# Patient Record
Sex: FEMALE | Race: WHITE | Employment: OTHER | URBAN - METROPOLITAN AREA
[De-identification: names, ages, dates, MRNs, and addresses within clinical notes are randomized per-mention and may not be internally consistent; named-entity substitution may affect disease eponyms.]

---

## 2017-01-24 ENCOUNTER — HOSPITAL ENCOUNTER (OUTPATIENT)
Dept: LAB | Age: 57
Discharge: HOME | End: 2017-01-24
Payer: COMMERCIAL

## 2017-01-24 DIAGNOSIS — C92.10 CML (CHRONIC MYELOID LEUKEMIA) (HCC): ICD-10-CM

## 2017-01-24 LAB
ALBUMIN SERPL BCP-MCNC: 3.9 G/DL (ref 3.5–5)
ALBUMIN/GLOB SERPL: 1.3 {RATIO} (ref 1.2–3.5)
ALP SERPL-CCNC: 64 U/L (ref 50–136)
ALT SERPL-CCNC: 36 U/L (ref 12–65)
ANION GAP BLD CALC-SCNC: 5 MMOL/L (ref 7–16)
AST SERPL W P-5'-P-CCNC: 24 U/L (ref 15–37)
BASOPHILS # BLD AUTO: 0.1 K/UL (ref 0–0.2)
BASOPHILS # BLD: 1 % (ref 0–2)
BILIRUB SERPL-MCNC: 0.5 MG/DL (ref 0.2–1.1)
BUN SERPL-MCNC: 8 MG/DL (ref 6–23)
CALCIUM SERPL-MCNC: 8.5 MG/DL (ref 8.3–10.4)
CHLORIDE SERPL-SCNC: 107 MMOL/L (ref 98–107)
CO2 SERPL-SCNC: 29 MMOL/L (ref 23–32)
CREAT SERPL-MCNC: 0.92 MG/DL (ref 0.6–1)
DIFFERENTIAL METHOD BLD: ABNORMAL
EOSINOPHIL # BLD: 0.2 K/UL (ref 0–0.8)
EOSINOPHIL NFR BLD: 5 % (ref 0.5–7.8)
ERYTHROCYTE [DISTWIDTH] IN BLOOD BY AUTOMATED COUNT: 15.5 % (ref 11.9–14.6)
GLOBULIN SER CALC-MCNC: 2.9 G/DL (ref 2.3–3.5)
GLUCOSE SERPL-MCNC: 142 MG/DL (ref 65–100)
HCT VFR BLD AUTO: 33 % (ref 35.8–46.3)
HGB BLD-MCNC: 10.1 G/DL (ref 11.7–15.4)
LYMPHOCYTES # BLD AUTO: 33 % (ref 13–44)
LYMPHOCYTES # BLD: 1.5 K/UL (ref 0.5–4.6)
MCH RBC QN AUTO: 22 PG (ref 26.1–32.9)
MCHC RBC AUTO-ENTMCNC: 30.6 G/DL (ref 31.4–35)
MCV RBC AUTO: 71.7 FL (ref 79.6–97.8)
MONOCYTES # BLD: 0.5 K/UL (ref 0.1–1.3)
MONOCYTES NFR BLD AUTO: 10 % (ref 4–12)
NEUTS SEG # BLD: 2.3 K/UL (ref 1.7–8.2)
NEUTS SEG NFR BLD AUTO: 51 % (ref 43–78)
NRBC # BLD: 0 K/UL (ref 0–0.2)
PLATELET # BLD AUTO: 231 K/UL (ref 150–450)
PMV BLD AUTO: 10.4 FL (ref 10.8–14.1)
POTASSIUM SERPL-SCNC: 3.7 MMOL/L (ref 3.5–5.1)
PROT SERPL-MCNC: 6.8 G/DL (ref 6.3–8.2)
RBC # BLD AUTO: 4.6 M/UL (ref 4.05–5.25)
SODIUM SERPL-SCNC: 141 MMOL/L (ref 136–145)
WBC # BLD AUTO: 4.6 K/UL (ref 4.3–11.1)

## 2017-01-24 PROCEDURE — 80053 COMPREHEN METABOLIC PANEL: CPT | Performed by: INTERNAL MEDICINE

## 2017-01-24 PROCEDURE — 85025 COMPLETE CBC W/AUTO DIFF WBC: CPT | Performed by: INTERNAL MEDICINE

## 2017-01-24 PROCEDURE — 36415 COLL VENOUS BLD VENIPUNCTURE: CPT | Performed by: INTERNAL MEDICINE

## 2017-01-30 LAB
Lab: NORMAL
REFERENCE LAB,REFLB: NORMAL
TEST DESCRIPTION:,ATST: NORMAL

## 2017-01-31 ENCOUNTER — PATIENT OUTREACH (OUTPATIENT)
Dept: CASE MANAGEMENT | Age: 57
End: 2017-01-31

## 2017-01-31 NOTE — ACP (ADVANCE CARE PLANNING)
1/31/17:  Patient here for follow-up with Dr. Salima Ying. Patient tearful and frustrated before the appointment and scored 10 on distress thermometer. Patient given follow-up PHQ forms and total score was 4. Patient admits that she and her  are having problems and she is very frustrated. Patient states that she doesn't want to harm herself or anyone else. She doesn't feel at risk of being harmed. Patient may stay with her mother margareth. Dr. Salima Ying discussed results of BCR-abl and patient very pleased with results. Patient to follow-up with Dr. Salima Ying in 3 months with labs week before.

## 2017-02-21 ENCOUNTER — HOSPITAL ENCOUNTER (OUTPATIENT)
Dept: GENERAL RADIOLOGY | Age: 57
Discharge: HOME | End: 2017-02-21
Payer: COMMERCIAL

## 2017-02-21 DIAGNOSIS — M48.02 CERVICAL SPINAL STENOSIS: ICD-10-CM

## 2017-02-21 DIAGNOSIS — M50.30 DEGENERATION OF CERVICAL INTERVERTEBRAL DISC: ICD-10-CM

## 2017-02-21 PROBLEM — M19.90 ARTHRITIS: Status: ACTIVE | Noted: 2017-02-21

## 2017-02-21 PROBLEM — C80.1 CANCER (HCC): Status: ACTIVE | Noted: 2017-02-21

## 2017-02-21 PROBLEM — E07.9 THYROID DISEASE: Status: ACTIVE | Noted: 2017-02-21

## 2017-02-21 PROBLEM — D64.9 ANEMIA: Status: ACTIVE | Noted: 2017-02-21

## 2017-02-21 PROCEDURE — 72040 X-RAY EXAM NECK SPINE 2-3 VW: CPT

## 2017-02-22 ENCOUNTER — HOSPITAL ENCOUNTER (OUTPATIENT)
Dept: SURGERY | Age: 57
Discharge: HOME | End: 2017-02-22
Payer: COMMERCIAL

## 2017-02-22 VITALS
HEIGHT: 65 IN | SYSTOLIC BLOOD PRESSURE: 117 MMHG | BODY MASS INDEX: 34.82 KG/M2 | TEMPERATURE: 98.4 F | DIASTOLIC BLOOD PRESSURE: 72 MMHG | WEIGHT: 209 LBS | OXYGEN SATURATION: 100 % | RESPIRATION RATE: 16 BRPM | HEART RATE: 98 BPM

## 2017-02-22 LAB
ANION GAP BLD CALC-SCNC: 8 MMOL/L (ref 7–16)
APPEARANCE UR: CLEAR
BACTERIA SPEC CULT: NORMAL
BASOPHILS # BLD AUTO: 0 K/UL (ref 0–0.2)
BASOPHILS # BLD: 0 % (ref 0–2)
BILIRUB UR QL: NEGATIVE
BUN SERPL-MCNC: 10 MG/DL (ref 6–23)
CALCIUM SERPL-MCNC: 8.6 MG/DL (ref 8.3–10.4)
CHLORIDE SERPL-SCNC: 108 MMOL/L (ref 98–107)
CO2 SERPL-SCNC: 30 MMOL/L (ref 21–32)
COLOR UR: YELLOW
CREAT SERPL-MCNC: 0.81 MG/DL (ref 0.6–1)
DIFFERENTIAL METHOD BLD: ABNORMAL
EOSINOPHIL # BLD: 0.2 K/UL (ref 0–0.8)
EOSINOPHIL NFR BLD: 3 % (ref 0.5–7.8)
ERYTHROCYTE [DISTWIDTH] IN BLOOD BY AUTOMATED COUNT: 15.8 % (ref 11.9–14.6)
GLUCOSE SERPL-MCNC: 108 MG/DL (ref 65–100)
GLUCOSE UR STRIP.AUTO-MCNC: NEGATIVE MG/DL
HCT VFR BLD AUTO: 30.8 % (ref 35.8–46.3)
HGB BLD-MCNC: 9.3 G/DL (ref 11.7–15.4)
HGB UR QL STRIP: NEGATIVE
IMM GRANULOCYTES # BLD: 0 K/UL (ref 0–0.5)
IMM GRANULOCYTES NFR BLD AUTO: 0.2 % (ref 0–5)
KETONES UR QL STRIP.AUTO: NEGATIVE MG/DL
LEUKOCYTE ESTERASE UR QL STRIP.AUTO: NEGATIVE
LYMPHOCYTES # BLD AUTO: 28 % (ref 13–44)
LYMPHOCYTES # BLD: 1.7 K/UL (ref 0.5–4.6)
MCH RBC QN AUTO: 21.7 PG (ref 26.1–32.9)
MCHC RBC AUTO-ENTMCNC: 30.2 G/DL (ref 31.4–35)
MCV RBC AUTO: 71.8 FL (ref 79.6–97.8)
MONOCYTES # BLD: 0.6 K/UL (ref 0.1–1.3)
MONOCYTES NFR BLD AUTO: 9 % (ref 4–12)
NEUTS SEG # BLD: 3.6 K/UL (ref 1.7–8.2)
NEUTS SEG NFR BLD AUTO: 60 % (ref 43–78)
NITRITE UR QL STRIP.AUTO: NEGATIVE
PH UR STRIP: 6 [PH] (ref 5–9)
PLATELET # BLD AUTO: 228 K/UL (ref 150–450)
PMV BLD AUTO: 10.4 FL (ref 10.8–14.1)
POTASSIUM SERPL-SCNC: 3.6 MMOL/L (ref 3.5–5.1)
PROT UR STRIP-MCNC: NEGATIVE MG/DL
RBC # BLD AUTO: 4.29 M/UL (ref 4.05–5.25)
SERVICE CMNT-IMP: NORMAL
SODIUM SERPL-SCNC: 146 MMOL/L (ref 136–145)
SP GR UR REFRACTOMETRY: >1.03 (ref 1–1.02)
UROBILINOGEN UR QL STRIP.AUTO: 0.2 EU/DL (ref 0.2–1)
WBC # BLD AUTO: 6.1 K/UL (ref 4.3–11.1)

## 2017-02-22 PROCEDURE — 81003 URINALYSIS AUTO W/O SCOPE: CPT | Performed by: NEUROLOGICAL SURGERY

## 2017-02-22 PROCEDURE — 80048 BASIC METABOLIC PNL TOTAL CA: CPT | Performed by: NEUROLOGICAL SURGERY

## 2017-02-22 PROCEDURE — 85025 COMPLETE CBC W/AUTO DIFF WBC: CPT | Performed by: NEUROLOGICAL SURGERY

## 2017-02-22 PROCEDURE — 87641 MR-STAPH DNA AMP PROBE: CPT | Performed by: NEUROLOGICAL SURGERY

## 2017-02-22 RX ORDER — AMOXICILLIN 500 MG/1
500 CAPSULE ORAL ONCE
COMMUNITY
End: 2017-05-23

## 2017-02-22 RX ORDER — ESTRADIOL 0.1 MG/G
2 CREAM VAGINAL
COMMUNITY
End: 2018-05-08 | Stop reason: SDUPTHER

## 2017-02-22 RX ORDER — SIMETHICONE 80 MG
80 TABLET,CHEWABLE ORAL
COMMUNITY
End: 2017-07-25

## 2017-02-22 RX ORDER — BACLOFEN 10 MG/1
10 TABLET ORAL 3 TIMES DAILY
COMMUNITY
End: 2018-07-27

## 2017-02-22 NOTE — PERIOP NOTES
Patient verified name, , and surgery as listed in [facility]. TYPE  CASE:lll  Labs per surgeon: cbc, bmp and urinalysis and MRSA  Labs per anesthesia protocol: T&S on arrival  EKG  :  ekg from  being faxed from Dr Megan Mullen, patient denies any hx of chest pain, SOB with stairs or CAD  MRSA/MSSA:awaiting results  Pt instructed that they will be notified of positive results and will use mupirocin ointment as directed. Patient provided with handouts including guide to surgery , transfusions, pain management and hand hygiene for the family and community. Pt verbalizes understanding of all pre-op instructions . Instructed that family must be present in building at all times. Hibiclens and instructions given per hospital policy. Instructed patient to continue  previous medications as prescribed prior to surgery and  to take the following medications the day of surgery according to anesthesia guidelines : baclofen if needed, levothyroxine, and tramadol if needed     Continue all previous medications unless otherwise directed. Original medication prescription bottles were visualized during patient appointment. Instructed patient to hold  the following medications prior to surgery: excedrin andvitamins      Patient verbalized understanding of all instructions and provided all medical/health information to the best of their ability. Road to Recovery Spine surgery patient guide given. Instructed on incentive spirometry with return demonstration. Long handled prehab sponge given with instructions for use. Patient viewed spine prehab video.

## 2017-03-04 ENCOUNTER — ANESTHESIA EVENT (OUTPATIENT)
Dept: SURGERY | Age: 57
End: 2017-03-04
Payer: COMMERCIAL

## 2017-03-04 RX ORDER — SODIUM CHLORIDE 0.9 % (FLUSH) 0.9 %
5-10 SYRINGE (ML) INJECTION EVERY 8 HOURS
Status: CANCELLED | OUTPATIENT
Start: 2017-03-05

## 2017-03-04 RX ORDER — SODIUM CHLORIDE 0.9 % (FLUSH) 0.9 %
5-10 SYRINGE (ML) INJECTION AS NEEDED
Status: CANCELLED | OUTPATIENT
Start: 2017-03-04

## 2017-03-05 NOTE — ANESTHESIA PREPROCEDURE EVALUATION
Anesthetic History               Review of Systems / Medical History  Patient summary reviewed, nursing notes reviewed and pertinent labs reviewed    Pulmonary                   Neuro/Psych         Headaches (Migraines)     Cardiovascular                  Exercise tolerance: >4 METS     GI/Hepatic/Renal           PUD (Remote hx)     Endo/Other      Hypothyroidism: well controlled  Obesity and arthritis     Other Findings   Comments: CML -- in remission    Chronic anemia secondary to beta thalassemia         Physical Exam    Airway  Mallampati: II  TM Distance: 4 - 6 cm  Neck ROM: decreased range of motion   Mouth opening: Normal     Cardiovascular  Regular rate and rhythm,  S1 and S2 normal,  no murmur, click, rub, or gallop             Dental         Pulmonary  Breath sounds clear to auscultation               Abdominal  GI exam deferred       Other Findings            Anesthetic Plan    ASA: 3  Anesthesia type: general            Anesthetic plan and risks discussed with: Patient

## 2017-03-06 ENCOUNTER — APPOINTMENT (OUTPATIENT)
Dept: GENERAL RADIOLOGY | Age: 57
End: 2017-03-06
Attending: NEUROLOGICAL SURGERY
Payer: COMMERCIAL

## 2017-03-06 ENCOUNTER — HOSPITAL ENCOUNTER (OUTPATIENT)
Age: 57
Setting detail: OUTPATIENT SURGERY
Discharge: HOME | End: 2017-03-06
Attending: NEUROLOGICAL SURGERY | Admitting: NEUROLOGICAL SURGERY
Payer: COMMERCIAL

## 2017-03-06 ENCOUNTER — ANESTHESIA (OUTPATIENT)
Dept: SURGERY | Age: 57
End: 2017-03-06
Payer: COMMERCIAL

## 2017-03-06 VITALS
SYSTOLIC BLOOD PRESSURE: 131 MMHG | HEIGHT: 65 IN | DIASTOLIC BLOOD PRESSURE: 74 MMHG | OXYGEN SATURATION: 96 % | HEART RATE: 67 BPM | BODY MASS INDEX: 34.82 KG/M2 | WEIGHT: 209 LBS | RESPIRATION RATE: 18 BRPM | TEMPERATURE: 98.5 F

## 2017-03-06 LAB
ABO + RH BLD: NORMAL
BLOOD GROUP ANTIBODIES SERPL: NORMAL
SPECIMEN EXP DATE BLD: NORMAL

## 2017-03-06 PROCEDURE — 74011250636 HC RX REV CODE- 250/636: Performed by: ANESTHESIOLOGY

## 2017-03-06 PROCEDURE — 77030026404 HC PLT SPN ANCHR ROIC ZIMM -H: Performed by: NEUROLOGICAL SURGERY

## 2017-03-06 PROCEDURE — 77030018390 HC SPNG HEMSTAT2 J&J -B: Performed by: NEUROLOGICAL SURGERY

## 2017-03-06 PROCEDURE — 76210000021 HC REC RM PH II 0.5 TO 1 HR: Performed by: NEUROLOGICAL SURGERY

## 2017-03-06 PROCEDURE — 74011000250 HC RX REV CODE- 250: Performed by: ANESTHESIOLOGY

## 2017-03-06 PROCEDURE — 77030003029 HC SUT VCRL J&J -B: Performed by: NEUROLOGICAL SURGERY

## 2017-03-06 PROCEDURE — 76060000034 HC ANESTHESIA 1.5 TO 2 HR: Performed by: NEUROLOGICAL SURGERY

## 2017-03-06 PROCEDURE — 74011000250 HC RX REV CODE- 250: Performed by: NEUROLOGICAL SURGERY

## 2017-03-06 PROCEDURE — 77030011640 HC PAD GRND REM COVD -A: Performed by: NEUROLOGICAL SURGERY

## 2017-03-06 PROCEDURE — 77030014650 HC SEAL MTRX FLOSEL BAXT -C: Performed by: NEUROLOGICAL SURGERY

## 2017-03-06 PROCEDURE — 77030019908 HC STETH ESOPH SIMS -A: Performed by: ANESTHESIOLOGY

## 2017-03-06 PROCEDURE — 77030004391 HC BUR FLUT MEDT -C: Performed by: NEUROLOGICAL SURGERY

## 2017-03-06 PROCEDURE — 74011250636 HC RX REV CODE- 250/636

## 2017-03-06 PROCEDURE — 77030032490 HC SLV COMPR SCD KNE COVD -B: Performed by: NEUROLOGICAL SURGERY

## 2017-03-06 PROCEDURE — 77030020782 HC GWN BAIR PAWS FLX 3M -B: Performed by: ANESTHESIOLOGY

## 2017-03-06 PROCEDURE — 86901 BLOOD TYPING SEROLOGIC RH(D): CPT | Performed by: NEUROLOGICAL SURGERY

## 2017-03-06 PROCEDURE — 77030016570 HC BLNKT BAIR HGGR 3M -B: Performed by: ANESTHESIOLOGY

## 2017-03-06 PROCEDURE — 77030010507 HC ADH SKN DERMBND J&J -B: Performed by: NEUROLOGICAL SURGERY

## 2017-03-06 PROCEDURE — 74011250636 HC RX REV CODE- 250/636: Performed by: NEUROLOGICAL SURGERY

## 2017-03-06 PROCEDURE — 76210000006 HC OR PH I REC 0.5 TO 1 HR: Performed by: NEUROLOGICAL SURGERY

## 2017-03-06 PROCEDURE — 77030008703 HC TU ET UNCUF COVD -A: Performed by: ANESTHESIOLOGY

## 2017-03-06 PROCEDURE — 77030030163 HC BN WAX J&J -A: Performed by: NEUROLOGICAL SURGERY

## 2017-03-06 PROCEDURE — 74011250637 HC RX REV CODE- 250/637: Performed by: ANESTHESIOLOGY

## 2017-03-06 PROCEDURE — 77030018836 HC SOL IRR NACL ICUM -A: Performed by: NEUROLOGICAL SURGERY

## 2017-03-06 PROCEDURE — 77030034475 HC MISC IMPL SPN: Performed by: NEUROLOGICAL SURGERY

## 2017-03-06 PROCEDURE — 88304 TISSUE EXAM BY PATHOLOGIST: CPT | Performed by: NEUROLOGICAL SURGERY

## 2017-03-06 PROCEDURE — 72040 X-RAY EXAM NECK SPINE 2-3 VW: CPT

## 2017-03-06 PROCEDURE — C1713 ANCHOR/SCREW BN/BN,TIS/BN: HCPCS | Performed by: NEUROLOGICAL SURGERY

## 2017-03-06 PROCEDURE — 74011000250 HC RX REV CODE- 250

## 2017-03-06 PROCEDURE — 76010000162 HC OR TIME 1.5 TO 2 HR INTENSV-TIER 1: Performed by: NEUROLOGICAL SURGERY

## 2017-03-06 PROCEDURE — 77030003666 HC NDL SPINAL BD -A: Performed by: NEUROLOGICAL SURGERY

## 2017-03-06 PROCEDURE — 77030011267 HC ELECTRD BLD COVD -A: Performed by: NEUROLOGICAL SURGERY

## 2017-03-06 PROCEDURE — 77030008477 HC STYL SATN SLP COVD -A: Performed by: ANESTHESIOLOGY

## 2017-03-06 PROCEDURE — 77030026405 HC CGE ANT CERV ROIC ZIMM -H: Performed by: NEUROLOGICAL SURGERY

## 2017-03-06 DEVICE — ROI-C ANCHORING PLATE
Type: IMPLANTABLE DEVICE | Site: SPINE CERVICAL | Status: FUNCTIONAL
Brand: ROI-C

## 2017-03-06 DEVICE — ROI-C LORDOTIC IMPLANT H7MM 12 X 14MM
Type: IMPLANTABLE DEVICE | Site: SPINE CERVICAL | Status: FUNCTIONAL
Brand: ROI-C

## 2017-03-06 RX ORDER — OXYCODONE AND ACETAMINOPHEN 7.5; 325 MG/1; MG/1
1 TABLET ORAL
Qty: 30 TAB | Refills: 0 | Status: SHIPPED | OUTPATIENT
Start: 2017-03-06 | End: 2017-05-23

## 2017-03-06 RX ORDER — LIDOCAINE HYDROCHLORIDE AND EPINEPHRINE 10; 10 MG/ML; UG/ML
INJECTION, SOLUTION INFILTRATION; PERINEURAL AS NEEDED
Status: DISCONTINUED | OUTPATIENT
Start: 2017-03-06 | End: 2017-03-06 | Stop reason: HOSPADM

## 2017-03-06 RX ORDER — OXYCODONE HYDROCHLORIDE 5 MG/1
10 TABLET ORAL
Status: DISCONTINUED | OUTPATIENT
Start: 2017-03-06 | End: 2017-03-06 | Stop reason: HOSPADM

## 2017-03-06 RX ORDER — PROPOFOL 10 MG/ML
INJECTION, EMULSION INTRAVENOUS AS NEEDED
Status: DISCONTINUED | OUTPATIENT
Start: 2017-03-06 | End: 2017-03-06 | Stop reason: HOSPADM

## 2017-03-06 RX ORDER — NEOSTIGMINE METHYLSULFATE 1 MG/ML
INJECTION INTRAVENOUS AS NEEDED
Status: DISCONTINUED | OUTPATIENT
Start: 2017-03-06 | End: 2017-03-06 | Stop reason: HOSPADM

## 2017-03-06 RX ORDER — NALOXONE HYDROCHLORIDE 0.4 MG/ML
0.1 INJECTION, SOLUTION INTRAMUSCULAR; INTRAVENOUS; SUBCUTANEOUS AS NEEDED
Status: DISCONTINUED | OUTPATIENT
Start: 2017-03-06 | End: 2017-03-06 | Stop reason: HOSPADM

## 2017-03-06 RX ORDER — CEFAZOLIN SODIUM IN 0.9 % NACL 2 G/50 ML
2 INTRAVENOUS SOLUTION, PIGGYBACK (ML) INTRAVENOUS ONCE
Status: DISCONTINUED | OUTPATIENT
Start: 2017-03-06 | End: 2017-03-06 | Stop reason: HOSPADM

## 2017-03-06 RX ORDER — SODIUM CHLORIDE, SODIUM LACTATE, POTASSIUM CHLORIDE, CALCIUM CHLORIDE 600; 310; 30; 20 MG/100ML; MG/100ML; MG/100ML; MG/100ML
125 INJECTION, SOLUTION INTRAVENOUS CONTINUOUS
Status: DISCONTINUED | OUTPATIENT
Start: 2017-03-06 | End: 2017-03-06 | Stop reason: HOSPADM

## 2017-03-06 RX ORDER — LIDOCAINE HYDROCHLORIDE 20 MG/ML
INJECTION, SOLUTION EPIDURAL; INFILTRATION; INTRACAUDAL; PERINEURAL AS NEEDED
Status: DISCONTINUED | OUTPATIENT
Start: 2017-03-06 | End: 2017-03-06 | Stop reason: HOSPADM

## 2017-03-06 RX ORDER — GLYCOPYRROLATE 0.2 MG/ML
INJECTION INTRAMUSCULAR; INTRAVENOUS AS NEEDED
Status: DISCONTINUED | OUTPATIENT
Start: 2017-03-06 | End: 2017-03-06 | Stop reason: HOSPADM

## 2017-03-06 RX ORDER — FENTANYL CITRATE 50 UG/ML
INJECTION, SOLUTION INTRAMUSCULAR; INTRAVENOUS AS NEEDED
Status: DISCONTINUED | OUTPATIENT
Start: 2017-03-06 | End: 2017-03-06 | Stop reason: HOSPADM

## 2017-03-06 RX ORDER — ACETAMINOPHEN 500 MG
1000 TABLET ORAL ONCE
Status: DISCONTINUED | OUTPATIENT
Start: 2017-03-06 | End: 2017-03-06 | Stop reason: HOSPADM

## 2017-03-06 RX ORDER — FAMOTIDINE 10 MG/ML
20 INJECTION INTRAVENOUS ONCE
Status: COMPLETED | OUTPATIENT
Start: 2017-03-06 | End: 2017-03-06

## 2017-03-06 RX ORDER — MIDAZOLAM HYDROCHLORIDE 1 MG/ML
2 INJECTION, SOLUTION INTRAMUSCULAR; INTRAVENOUS
Status: DISCONTINUED | OUTPATIENT
Start: 2017-03-06 | End: 2017-03-06 | Stop reason: HOSPADM

## 2017-03-06 RX ORDER — SODIUM CHLORIDE 0.9 % (FLUSH) 0.9 %
5-10 SYRINGE (ML) INJECTION AS NEEDED
Status: DISCONTINUED | OUTPATIENT
Start: 2017-03-06 | End: 2017-03-06 | Stop reason: HOSPADM

## 2017-03-06 RX ORDER — LIDOCAINE HYDROCHLORIDE 10 MG/ML
0.1 INJECTION INFILTRATION; PERINEURAL AS NEEDED
Status: DISCONTINUED | OUTPATIENT
Start: 2017-03-06 | End: 2017-03-06 | Stop reason: HOSPADM

## 2017-03-06 RX ORDER — HYDROMORPHONE HYDROCHLORIDE 2 MG/ML
0.5 INJECTION, SOLUTION INTRAMUSCULAR; INTRAVENOUS; SUBCUTANEOUS
Status: DISCONTINUED | OUTPATIENT
Start: 2017-03-06 | End: 2017-03-06 | Stop reason: HOSPADM

## 2017-03-06 RX ORDER — ROCURONIUM BROMIDE 10 MG/ML
INJECTION, SOLUTION INTRAVENOUS AS NEEDED
Status: DISCONTINUED | OUTPATIENT
Start: 2017-03-06 | End: 2017-03-06 | Stop reason: HOSPADM

## 2017-03-06 RX ORDER — DEXAMETHASONE SODIUM PHOSPHATE 100 MG/10ML
10 INJECTION INTRAMUSCULAR; INTRAVENOUS ONCE
Status: COMPLETED | OUTPATIENT
Start: 2017-03-06 | End: 2017-03-06

## 2017-03-06 RX ORDER — ONDANSETRON 2 MG/ML
INJECTION INTRAMUSCULAR; INTRAVENOUS AS NEEDED
Status: DISCONTINUED | OUTPATIENT
Start: 2017-03-06 | End: 2017-03-06 | Stop reason: HOSPADM

## 2017-03-06 RX ORDER — BACITRACIN 50000 [IU]/1
INJECTION, POWDER, FOR SOLUTION INTRAMUSCULAR AS NEEDED
Status: DISCONTINUED | OUTPATIENT
Start: 2017-03-06 | End: 2017-03-06 | Stop reason: HOSPADM

## 2017-03-06 RX ORDER — KETOROLAC TROMETHAMINE 30 MG/ML
30 INJECTION, SOLUTION INTRAMUSCULAR; INTRAVENOUS AS NEEDED
Status: DISCONTINUED | OUTPATIENT
Start: 2017-03-06 | End: 2017-03-06 | Stop reason: HOSPADM

## 2017-03-06 RX ADMIN — ONDANSETRON 4 MG: 2 INJECTION INTRAMUSCULAR; INTRAVENOUS at 08:07

## 2017-03-06 RX ADMIN — SODIUM CHLORIDE, SODIUM LACTATE, POTASSIUM CHLORIDE, AND CALCIUM CHLORIDE 125 ML/HR: 600; 310; 30; 20 INJECTION, SOLUTION INTRAVENOUS at 06:54

## 2017-03-06 RX ADMIN — ROCURONIUM BROMIDE 5 MG: 10 INJECTION, SOLUTION INTRAVENOUS at 08:39

## 2017-03-06 RX ADMIN — DEXAMETHASONE SODIUM PHOSPHATE 10 MG: 10 INJECTION INTRAMUSCULAR; INTRAVENOUS at 06:54

## 2017-03-06 RX ADMIN — SODIUM CHLORIDE, SODIUM LACTATE, POTASSIUM CHLORIDE, AND CALCIUM CHLORIDE: 600; 310; 30; 20 INJECTION, SOLUTION INTRAVENOUS at 08:32

## 2017-03-06 RX ADMIN — MIDAZOLAM HYDROCHLORIDE 2 MG: 1 INJECTION, SOLUTION INTRAMUSCULAR; INTRAVENOUS at 06:56

## 2017-03-06 RX ADMIN — NEOSTIGMINE METHYLSULFATE 4 MG: 1 INJECTION INTRAVENOUS at 08:48

## 2017-03-06 RX ADMIN — ROCURONIUM BROMIDE 40 MG: 10 INJECTION, SOLUTION INTRAVENOUS at 07:37

## 2017-03-06 RX ADMIN — OXYCODONE HYDROCHLORIDE 10 MG: 5 TABLET ORAL at 10:20

## 2017-03-06 RX ADMIN — LIDOCAINE HYDROCHLORIDE 100 MG: 20 INJECTION, SOLUTION EPIDURAL; INFILTRATION; INTRACAUDAL; PERINEURAL at 07:37

## 2017-03-06 RX ADMIN — HYDROMORPHONE HYDROCHLORIDE 0.5 MG: 2 INJECTION, SOLUTION INTRAMUSCULAR; INTRAVENOUS; SUBCUTANEOUS at 09:20

## 2017-03-06 RX ADMIN — GLYCOPYRROLATE 0.6 MG: 0.2 INJECTION INTRAMUSCULAR; INTRAVENOUS at 08:48

## 2017-03-06 RX ADMIN — LIDOCAINE HYDROCHLORIDE 0.1 ML: 10 INJECTION, SOLUTION INFILTRATION; PERINEURAL at 06:53

## 2017-03-06 RX ADMIN — FENTANYL CITRATE 50 MCG: 50 INJECTION, SOLUTION INTRAMUSCULAR; INTRAVENOUS at 08:03

## 2017-03-06 RX ADMIN — HYDROMORPHONE HYDROCHLORIDE 0.5 MG: 2 INJECTION, SOLUTION INTRAMUSCULAR; INTRAVENOUS; SUBCUTANEOUS at 09:25

## 2017-03-06 RX ADMIN — FAMOTIDINE 20 MG: 10 INJECTION, SOLUTION INTRAVENOUS at 06:54

## 2017-03-06 RX ADMIN — FENTANYL CITRATE 100 MCG: 50 INJECTION, SOLUTION INTRAMUSCULAR; INTRAVENOUS at 07:37

## 2017-03-06 RX ADMIN — FENTANYL CITRATE 50 MCG: 50 INJECTION, SOLUTION INTRAMUSCULAR; INTRAVENOUS at 08:00

## 2017-03-06 RX ADMIN — CEFAZOLIN 2 G: 1 INJECTION, POWDER, FOR SOLUTION INTRAMUSCULAR; INTRAVENOUS; PARENTERAL at 07:40

## 2017-03-06 RX ADMIN — PROPOFOL 170 MG: 10 INJECTION, EMULSION INTRAVENOUS at 07:37

## 2017-03-06 NOTE — DISCHARGE INSTRUCTIONS
[facility]  [address], [address] [facility], [address] [facility]  [phone]    Postoperative Home Instructions    - SHOWERING: You may shower the first day you are home with the dressing on. Do not soak in a tub for at least three weeks. Use only soap and water on the incision and pat it dry. Do not scrub the incision.    - WOUND CARE: A small to moderate amount of reddish drainage on the dressing is normal the first 1-2 days after surgery. If your dressing becomes soaked with drainage, let your doctor know. [facility] HANDS BEFORE AND AFTER INCISION CARE.    - SIGNS OF INFECTION: Please notify your physician for any of the following: a temperature greater than 100.5, extreme tenderness at the wound, excessive redness and/or swelling, or large amounts of drainage from the wound. If you think you have a wound infection, call your physician's office immediately.    - SWALLOWING: Don't be alarmed if it seems there is a lump in your throat for several days after surgery- this is normal. Eat only soft foods and drink plenty of fluids until your throat feels better. If you begin having trouble swallowing, call the office immediately. - DRIVING: You may not begin driving until after your visit to the physician's office for a wound and suture check. This is normally 7-10 days after you come home from the hospital.    - MEDICATIONS: You may not take anti-inflammatory medications. These include over-the-counter ibuprofen products such as Motrin, Advil, Aleve and other related medications or prescription medications such as Ultram, Naproxen, Indocin, or Celebrex and others. These medications slow down the bone healing. You may take Tylenol. The pain medicine prescribed may be taken as needed. You should continue taking a stool softener twice a day, drink plenty of water and eat high fiber foods to avoid constipation. (This is a common problem patients experience while taking pain medicine).     - DEEP BREATHING EXERCISES: Continue to use your incentive spirometer for deep breathing exercises. - SMOKING: Smoking will interfere with your healing. If you smoke, you may end up having another surgery or more problems.    - ACTIVITY: Avoid reaching overhead, particularly to lift things, until you have been told that your fusion has healed. Do not lift objects heavier than a coffee cup or a newspaper. You shouldn't lift anything heavier than 2-5 pounds. When lifting, you should bend with your knees and keep your back straight. Sleeping may be difficult and sometimes requires you to change positions frequently; try to sleep with your head elevated 15-30 degrees. You may turn your head  Gradually from side to side, but avoid placing your chin to your chest or flexing your head backwards. You may remove your MARIKA hose when consistently walking. You may do steps and inclines in moderation.    - SEXUAL RELATIONS: You may resume sexual relations 2 weeks after your surgery. - WALKING PROGRAM: After your sutures are removed or dissolved, it is very important that you begin a progressive walking program. Walking strengthens the spinal muscles and will help protect your disc and vertebrae. You will need to increase your walking program up to two miles per day over the next four weeks. You should begin slowly with 1/8 to 1/4 of a mile and add to this each day. Please be very consistent with your walking program, as this is a vital part of your recovery.    - SYMPTOMS AFTER SURGERY: Don't be alarmed if you still have some of the same symptoms you had prior to surgery. The nerves often require time to heal after the pressure has been relieved. You may also experience pain between your shoulder blades which is common after this surgery. The level of pain you experience should improve as your body heals. - FOLLOW-UP: An appointment will be made for you to follow-up with your surgeon or the office nurse.  Please bring any X-rays of your neck to the appointmet. Please call your physician's office if you have any other questions or problems. Listen to your body, it will tell you if you are overdoing it. Use common sense and take care of yourself! Please call our office if you have any other questions or problems. Listen to your body; it will tell you if you are overdoing it. Use common sense and take care of yourself! After general anesthesia or intravenous sedation, for 24 hours or while taking prescription Narcotics:  · Limit your activities  · Do not drive and operate hazardous machinery  · Do not make important personal or business decisions  · Do  not drink alcoholic beverages  · If you have not urinated within 8 hours after discharge, please contact your surgeon on call. *  Please give a list of your current medications to your Primary Care Provider. *  Please update this list whenever your medications are discontinued, doses are      changed, or new medications (including over-the-counter products) are added. *  Please carry medication information at all times in case of emergency situations. These are general instructions for a healthy lifestyle:  No smoking/ No tobacco products/ Avoid exposure to second hand smoke  Surgeon General's Warning:  Quitting smoking now greatly reduces serious risk to your health. Obesity, smoking, and sedentary lifestyle greatly increases your risk for illness  A healthy diet, regular physical exercise & weight monitoring are important for maintaining a healthy lifestyle    You may be retaining fluid if you have a history of heart failure or if you experience any of the following symptoms:  Weight gain of 3 pounds or more overnight or 5 pounds in a week, increased swelling in our hands or feet or shortness of breath while lying flat in bed. Please call your doctor as soon as you notice any of these symptoms; do not wait until your next office visit.     Recognize signs and symptoms of STROKE:    F-face looks uneven    A-arms unable to move or move unevenly    S-speech slurred or non-existent    T-time-call 911 as soon as signs and symptoms begin-DO NOT go       Back to bed or wait to see if you get better-TIME IS BRAIN.

## 2017-03-06 NOTE — BRIEF OP NOTE
BRIEF OPERATIVE NOTE    Date of Procedure: 3/6/2017   Preoperative Diagnosis: Degenerative cervical disc [M50.30]  Cervical stenosis of spine [M48.02]  Postoperative Diagnosis: Degenerative cervical disc [M50.30]  Cervical stenosis of spine [M48.02]    Procedure(s):  ACDF C 4-5 PLATE REMOVAL C5 6  Surgeon(s) and Role:     * Erendira Herzog MD - Primary            Surgical Staff:  Circ-1: Mateusz Samuels RN  Radiology Technician: Margie Fox RT, R, CT  Scrub Tech-1: Rodman Goodell  Scrub Private/Assistant: Rsoendo Jeffries  Event Time In   Incision Start 7079   Incision Close 9385     Anesthesia: General   Estimated Blood Loss: minimal  Specimens:   ID Type Source Tests Collected by Time Destination   1 : disc material C4-5 Preservative Disc Material  Erendira Herzog MD 3/6/2017 0166 Pathology      Findings: stenosis  Complications: none  Implants:   Implant Name Type Inv.  Item Serial No.  Lot No. LRB No. Used Action   osteoamp   DUG--259 [facility]  N/A 1 Implanted   CAGE CERV LORDTC [id number] -- ADRIANO-C - [id number]  CAGE CERV LORDTC [id number] -- ADRIANO-C  [employer] [id number] N/A 1 Implanted   PLATE [facility] STD --  - [id number]   PLATE [facility] STD --    [employer] [id number] N/A 1 Implanted

## 2017-03-06 NOTE — ANESTHESIA POSTPROCEDURE EVALUATION
Post-Anesthesia Evaluation and Assessment    Patient: Scharlene Shone MRN: [id number]  SSN: [id number]    YOB: 1960  Age: 55 y.o. Sex: female       Cardiovascular Function/Vital Signs  Visit Vitals    /74    Pulse 67    Temp 36.9 °C (98.5 °F)    Resp 18    Ht 5' 5\" (1.651 m)    Wt 94.8 kg (209 lb)    SpO2 96%    BMI 34.78 kg/m2       Patient is status post general anesthesia for Procedure(s):  ACDF C 4-5 PLATE REMOVAL C5 6. Nausea/Vomiting: None    Postoperative hydration reviewed and adequate. Pain:  Pain Scale 1: Numeric (0 - 10) (03/06/17 0955)  Pain Intensity 1: 2 (03/06/17 0955)   Managed    Neurological Status:   Neuro (WDL): Within Defined Limits (03/06/17 0902)  Neuro  LUE Motor Response: Purposeful (03/06/17 0902)  LLE Motor Response: Purposeful (03/06/17 0902)  RUE Motor Response: Purposeful (03/06/17 0902)  RLE Motor Response: Purposeful (03/06/17 0902)   At baseline    Mental Status and Level of Consciousness: Arousable    Pulmonary Status:   O2 Device: Room air (03/06/17 0955)   Adequate oxygenation and airway patent    Complications related to anesthesia: None    Post-anesthesia assessment completed.  No concerns    Signed By: Oksana Solis MD     March 6, 2017

## 2017-03-06 NOTE — ANESTHESIA POSTPROCEDURE EVALUATION
Post-Anesthesia Evaluation and Assessment    Patient: Eveline Jimenez MRN: [id number]  SSN: [id number]    YOB: 1960  Age: 55 y.o. Sex: female       Cardiovascular Function/Vital Signs  Visit Vitals    /74    Pulse 67    Temp 36.9 °C (98.5 °F)    Resp 18    Ht 5' 5\" (1.651 m)    Wt 94.8 kg (209 lb)    SpO2 96%    BMI 34.78 kg/m2       Patient is status post general anesthesia for Procedure(s):  ACDF C 4-5 PLATE REMOVAL C5 6. Nausea/Vomiting: None    Postoperative hydration reviewed and adequate. Pain:  Pain Scale 1: Numeric (0 - 10) (03/06/17 0955)  Pain Intensity 1: 2 (03/06/17 0955)   Managed    Neurological Status:   Neuro (WDL): Within Defined Limits (03/06/17 0902)  Neuro  LUE Motor Response: Purposeful (03/06/17 0902)  LLE Motor Response: Purposeful (03/06/17 0902)  RUE Motor Response: Purposeful (03/06/17 0902)  RLE Motor Response: Purposeful (03/06/17 0902)   At baseline    Mental Status and Level of Consciousness: Arousable    Pulmonary Status:   O2 Device: Room air (03/06/17 0955)   Adequate oxygenation and airway patent    Complications related to anesthesia: None    Post-anesthesia assessment completed.  No concerns    Signed By: Kang Selby MD     March 6, 2017

## 2017-03-06 NOTE — OP NOTES
[address] [facility]   OPERATIVE REPORT       Name:  Cate Roque   MR#:  [id number]   :  1960   Account #:  [de-identified]   Date of Adm:  2017       DATE OF SURGERY: 2013. PREPROCEDURE DIAGNOSIS: Cervical stenosis with cord compression   and radiculopathy. POSTPROCEDURE DIAGNOSIS: Cervical stenosis with cord compression   and radiculopathy. NAME OF PROCEDURE:   1. Anterior cervical spine total diskectomy with decompression   of spinal cord and nerve roots, C4-5.   2. Anterior cervical spine interbody fusion with LDR ADRIANO-C cage,   OsteoAMP, and bone marrow aspirate, C4-5.   3. Anterior cervical spine instrumentation with LDR plating, C4-  5.   4. Anterior plate removal, O1-6.   5. Bone marrow aspiration, C4 vertebra. 6. Continuous intraoperative fluoroscopy. SURGEON: Alec Vargas. Hannah Garcia MD.    ANESTHESIA: General endotracheal.    ESTIMATED BLOOD LOSS: Minimal.    PREPARATION: ChloraPrep. COMPLICATIONS: None. SPECIMENS REMOVED: DISC    HISTORY OF PRESENT ILLNESS: A 55-year-old lady status post   cervical fusion at C5-6 13 years ago with good results. She   developed left upper extremity pain and weakness refractory to   conservative measures. MRI scanning and x-rays confirmed a   stable fusion at C5-6 with stenosis and disk protrusion at C4-5   centrally and to the left. Cord compression was noted. The top   half of the plate was encroaching the disk space and, therefore,   in order to access the disk space, the plate would have to be   removed. OPERATIVE NOTE: The patient was brought to the operating room,   was carefully placed under general endotracheal anesthesia   without complications, placed supine with a roll under the   shoulder blades, the head gently extended on a Bermudez pillow,   and the right side of the neck carefully prepped in the usual   sterile fashion.  The previous incision was outlined, infiltrated   with 1% Xylocaine with epinephrine, incised with a 15 blade, and   carried sharply through the platysma. Using rostral to caudal   subplatysmal dissection, a plane was created between the carotid   artery laterally and trachea and esophagus medially. Handheld   retractors were placed and the old tissue in the longus colli   muscles were stripped bilaterally. Lateral C-arm fluoroscopy   confirmed. An 18-gauge spinal needle inserted into the disk   space. Scar tissue was dissected off of the plate at Q3-5. The   locking mechanism was disengaged and using a square screwdriver,   4 screws were removed without any complications. The screw holes   were packed with Surgicel for hemostasis. The plate was then   easily removed with a small hemostat. Bony fusion was noted. At   this point, the C4 vertebra was entered with this, a [place]   distracting pin awl, and using a 3 mL syringe and 14 gauge   Angiocath, approximately 3 mL of bone marrow were aspirated   and were mixed with OsteoAMP on the back table. The hole was   plugged with Nu-Knit Surgicel and bone wax. The [employer] Bipin AMA   drill was used. The end-plates were drilled down to the   posterior longitudinal ligament which was opened with a 1 mm   [place] rongeur and the dura was widely decompressed   bilaterally. A ball tip probe was passed along the nerve roots   without further compression. The left side was tighter than the   right. A #7 LDR lordotic trial was tightly placed in the disk   space with good contact and distraction noted by fluoroscopy. A   #7 LDR ADRIANO-C cage was packed with OsteoAMP and bone marrow   aspirate was countersunk to a nice, tight fit using traction, a   bone impactor, and a mallet. The LDR plates were engaged first   inferiorly and then superiorly with 1 and 2-mm taps and a   mallet. The wound was irrigated with thrombin and during this   time, AP and lateral x-ray were obtained which confirmed good   position of the graft and hardware. Additional thrombin was   placed for 2 minutes. No further bleeding was noted. FloSeal was   placed. The wound was dry and it was closed. The platysma was   closed tightly with interrupted 3-0 Vicryl and subcutaneous   tissues closed with interrupted 3-0 Vicryl. Skin was closed with   Dermabond. The patient tolerated the procedure well, was   awakened, extubated, and taken to PACU in stable condition. There were no obvious complications. DISCHARGE INSTRUCTIONS: Diet regularly. Activity up as   tolerated. No heavy lifting, bending, or automobile driving. Showers are permissible, but no baths. The patient will contact   the physician if she develops any fever, drainage, swelling,   cellulitis, or neurological change. Return visit in 10 to 14   days for wound check. DISCHARGE MEDICATIONS: Include admission medicines plus Percocet   7.5/325 every 8 hours p.r.n. DISCHARGE CONDITION: Satisfactory.         Merlinda Marlin, MD [facility] / José Green   D:  03/06/2017   08:58   T:  03/06/2017   14:25   Job #:  [id number]

## 2017-03-06 NOTE — IP AVS SNAPSHOT
[address] [id number] 
[phone] Patient: Tai Caba MRN: [id number] NER:5/50/1667 You are allergic to the following Allergen Reactions Latex, Natural Rubber Shortness of Breath Latex Other (comments) Respiratory issues Hydrocodone-Acetaminophen Nausea and Vomiting Name brand lortab ok; allergic to generic only Meperidine Nausea and Vomiting Other Medication Rash Pt is allergic to iv contrast, bananas and kiwi fruit Recent Documentation Height Weight BMI OB Status Smoking Status 1.651 m 94.8 kg 34.78 kg/m2 Hysterectomy Never Smoker Emergency Contacts Name Discharge Info Relation Home Work Mobile Nicholas Lyn DISCHARGE CAREGIVER [3] Spouse [3] [phone] About your hospitalization You were admitted on:  March 6, 2017 [facility] on:  March 6, 2017 Unit phone number:  [phone] Why you were hospitalized Your primary diagnosis was:  Not on File Providers Seen During Your Hospitalizations Provider Role Specialty Primary office phone Angelika Tomas MD Attending Provider Neurosurgery [phone] Your Primary Care Physician (PCP) Primary Care Physician Office Phone Office Fax Robert Shipman [phone] Follow-up Information Follow up With Details Comments Contact Info Angelika Tomas MD Go on 3/20/2017 Follow up at 9:00 am [address] [facility] [address] 
[phone] Current Discharge Medication List  
  
START taking these medications Dose & Instructions Dispensing Information Comments Morning Noon Evening Bedtime  
 oxyCODONE-acetaminophen 7.5-325 mg per tablet Commonly known as:  PERCOCET 7.5 Your next dose is: Today, Tomorrow Other:  _________ Dose:  1 Tab Take 1 Tab by mouth every eight (8) hours as needed for Pain. Max Daily Amount: 3 Tabs. Quantity:  30 Tab Refills:  0 CONTINUE these medications which have CHANGED Dose & Instructions Dispensing Information Comments Morning Noon Evening Bedtime  
 furosemide 20 mg tablet Commonly known as:  LASIX What changed:  additional instructions Your next dose is: Today, Tomorrow Other:  _________ Dose:  20 mg Take 1 Tab by mouth daily as needed. Quantity:  30 Tab Refills:  0 CONTINUE these medications which have NOT CHANGED Dose & Instructions Dispensing Information Comments Morning Noon Evening Bedtime  
 amoxicillin 500 mg capsule Commonly known as:  AMOXIL Your next dose is: Today, Tomorrow Other:  _________ Dose:  500 mg Take 500 mg by mouth once. Takes 4 tabs one hour to dental appt Refills:  0  
     
   
   
   
  
 aspirin-acetaminophen-caffeine 250-250-65 mg per tablet Commonly known as:  EXCEDRIN ES Your next dose is: Today, Tomorrow Other:  _________ Dose:  1 Tab Take 1 Tab by mouth every six (6) hours as needed for Headache. Holding for surgery Refills:  0  
     
   
   
   
  
 baclofen 10 mg tablet Commonly known as:  LIORESAL Your next dose is: Today, Tomorrow Other:  _________ Dose:  10 mg Take 10 mg by mouth three (3) times daily. Indications: MUSCLE SPASTICITY OF SPINAL ORIGIN Refills:  0  
     
   
   
   
  
 clobetasol 0.05 % topical cream  
Commonly known as:  Nilda Mangle Your next dose is: Today, Tomorrow Other:  _________ Apply  to affected area as needed.   
 Refills:  0  
     
   
   
   
  
 clotrimazole 1 % topical cream  
Commonly known as:  Chele Dutta  
   
 Your next dose is: Today, Tomorrow Other:  _________ Apply  to affected area two (2) times daily as needed. Refills:  0 ESTRACE 0.01 % (0.1 mg/gram) vaginal cream  
Generic drug:  estradiol Your next dose is: Today, Tomorrow Other:  _________ Dose:  2 g Insert 2 g into vagina BID Mon Wed & Fri. Refills:  0  
     
   
   
   
  
 GAS-X 80 mg chewable tablet Generic drug:  simethicone Your next dose is: Today, Tomorrow Other:  _________ Dose:  80 mg Take 80 mg by mouth every six (6) hours as needed for Flatulence. Refills:  0  
     
   
   
   
  
 imatinib 400 mg tablet Commonly known as:  GLEEVEC Your next dose is: Today, Tomorrow Other:  _________ TAKE 1 TABLET (400MG) BY MOUTH ONCE A DAY WITH FOOD AND WATER. MAY CAUSE NAUSEA, VOMITING, MUSCLE PAIN AND EDEMA (SWELLING). AVOID GRAPEFRUI Quantity:  30 Tab Refills:  5  
     
   
   
   
  
 levothyroxine 137 mcg tablet Commonly known as:  SYNTHROID Your next dose is: Today, Tomorrow Other:  _________ Dose:  137 mcg Take 137 mcg by mouth Daily (before breakfast). Quantity:  90 Tab Refills:  2  
     
   
   
   
  
 traMADol 50 mg tablet Commonly known as:  ULTRAM  
   
Your next dose is: Today, Tomorrow Other:  _________ Dose:  50 mg Take 1 Tab by mouth every six (6) hours as needed for Pain. Max Daily Amount: 200 mg. Quantity:  30 Tab Refills:  1 TYLENOL PM PO Your next dose is: Today, Tomorrow Other:  _________ Take  by mouth. Refills:  0  
     
   
   
   
  
 valACYclovir 500 mg tablet Commonly known as:  VALTREX Your next dose is: Today, Tomorrow Other:  _________ Dose:  1000 mg Take 1,000 mg by mouth as needed. Refills:  0 Where to Get Your Medications Information on where to get these meds will be given to you by the nurse or doctor. ! Ask your nurse or doctor about these medications  
  oxyCODONE-acetaminophen 7.5-325 mg per tablet Discharge Instructions [facility] 
[address] [facility], [address] 
[phone] Postoperative Home Instructions 
 
- SHOWERING: You may shower the first day you are home with the dressing on. Do not soak in a tub for at least three weeks. Use only soap and water on the incision and pat it dry. Do not scrub the incision. 
 
- WOUND CARE: A small to moderate amount of reddish drainage on the dressing is normal the first 1-2 days after surgery. If your dressing becomes soaked with drainage, let your doctor know. [facility] HANDS BEFORE AND AFTER INCISION CARE. 
 
- SIGNS OF INFECTION: Please notify your physician for any of the following: a temperature greater than 100.5, extreme tenderness at the wound, excessive redness and/or swelling, or large amounts of drainage from the wound. If you think you have a wound infection, call your physician's office immediately. 
 
- SWALLOWING: Don't be alarmed if it seems there is a lump in your throat for several days after surgery- this is normal. Eat only soft foods and drink plenty of fluids until your throat feels better. If you begin having trouble swallowing, call the office immediately. - DRIVING: You may not begin driving until after your visit to the physician's office for a wound and suture check. This is normally 7-10 days after you come home from the hospital. 
 
- MEDICATIONS: You may not take anti-inflammatory medications. These include over-the-counter ibuprofen products such as Motrin, Advil, Aleve and other related medications or prescription medications such as Ultram, Naproxen, Indocin, or Celebrex and others.  These medications slow down the bone healing. You may take Tylenol. The pain medicine prescribed may be taken as needed. You should continue taking a stool softener twice a day, drink plenty of water and eat high fiber foods to avoid constipation. (This is a common problem patients experience while taking pain medicine). - DEEP BREATHING EXERCISES: Continue to use your incentive spirometer for deep breathing exercises. - SMOKING: Smoking will interfere with your healing. If you smoke, you may end up having another surgery or more problems. 
 
- ACTIVITY: Avoid reaching overhead, particularly to lift things, until you have been told that your fusion has healed. Do not lift objects heavier than a coffee cup or a newspaper. You shouldn't lift anything heavier than 2-5 pounds. When lifting, you should bend with your knees and keep your back straight. Sleeping may be difficult and sometimes requires you to change positions frequently; try to sleep with your head elevated 15-30 degrees. You may turn your head  Gradually from side to side, but avoid placing your chin to your chest or flexing your head backwards. You may remove your MARIKA hose when consistently walking. You may do steps and inclines in moderation. 
 
- SEXUAL RELATIONS: You may resume sexual relations 2 weeks after your surgery. - WALKING PROGRAM: After your sutures are removed or dissolved, it is very important that you begin a progressive walking program. Walking strengthens the spinal muscles and will help protect your disc and vertebrae. You will need to increase your walking program up to two miles per day over the next four weeks. You should begin slowly with 1/8 to 1/4 of a mile and add to this each day. Please be very consistent with your walking program, as this is a vital part of your recovery. 
 
- SYMPTOMS AFTER SURGERY: Don't be alarmed if you still have some of the same symptoms you had prior to surgery.  The nerves often require time to heal after the pressure has been relieved. You may also experience pain between your shoulder blades which is common after this surgery. The level of pain you experience should improve as your body heals. - FOLLOW-UP: An appointment will be made for you to follow-up with your surgeon or the office nurse. Please bring any X-rays of your neck to the appointmet. Please call your physician's office if you have any other questions or problems. Listen to your body, it will tell you if you are overdoing it. Use common sense and take care of yourself! Please call our office if you have any other questions or problems. Listen to your body; it will tell you if you are overdoing it. Use common sense and take care of yourself! After general anesthesia or intravenous sedation, for 24 hours or while taking prescription Narcotics: · Limit your activities · Do not drive and operate hazardous machinery · Do not make important personal or business decisions · Do  not drink alcoholic beverages · If you have not urinated within 8 hours after discharge, please contact your surgeon on call. *  Please give a list of your current medications to your Primary Care Provider. *  Please update this list whenever your medications are discontinued, doses are 
    changed, or new medications (including over-the-counter products) are added. *  Please carry medication information at all times in case of emergency situations. These are general instructions for a healthy lifestyle: No smoking/ No tobacco products/ Avoid exposure to second hand smoke Surgeon General's Warning:  Quitting smoking now greatly reduces serious risk to your health. Obesity, smoking, and sedentary lifestyle greatly increases your risk for illness A healthy diet, regular physical exercise & weight monitoring are important for maintaining a healthy lifestyle You may be retaining fluid if you have a history of heart failure or if you experience any of the following symptoms:  Weight gain of 3 pounds or more overnight or 5 pounds in a week, increased swelling in our hands or feet or shortness of breath while lying flat in bed. Please call your doctor as soon as you notice any of these symptoms; do not wait until your next office visit. Recognize signs and symptoms of STROKE: 
 
F-face looks uneven A-arms unable to move or move unevenly S-speech slurred or non-existent T-time-call 911 as soon as signs and symptoms begin-DO NOT go Back to bed or wait to see if you get better-TIME IS BRAIN. Discharge Orders None Introducing [facility]! Dear Gilberto Fleming: Thank you for requesting a Guguchu account. Our records indicate that you already have an active Guguchu account. You can access your account anytime at [email or web address] Did you know that you can access your hospital and ER discharge instructions at any time in Guguchu? You can also review all of your test results from your hospital stay or ER visit. Additional Information If you have questions, please visit the Frequently Asked Questions section of the Guguchu website at [email or web address] Remember, Guguchu is NOT to be used for urgent needs. For medical emergencies, dial 911. Now available from your iPhone and Android! General Information Please provide this summary of care documentation to your next provider. Patient Signature:  ____________________________________________________________ Date:  ____________________________________________________________  
  
[facility] Provider Signature:  ____________________________________________________________ Date:  ____________________________________________________________

## 2017-03-08 NOTE — OP NOTES
[address]   OPERATIVE REPORT       Name:  Jonelle Bobby   MR#:  [id number]   :  1960   Account #:  [de-identified]   Date of Adm:  2017       DATE OF SURGERY: 2017    PREPROCEDURE DIAGNOSIS: Cervical stenosis with cord compression   and radiculopathy. POSTPROCEDURE DIAGNOSIS: Cervical stenosis with cord compression   and radiculopathy. NAME OF PROCEDURE:   1. Anterior cervical spine total diskectomy with decompression   of spinal cord and nerve roots, C4-5.   2. Anterior cervical spine interbody fusion with LDR ADRIANO-C cage,   OsteoAMP, and bone marrow aspirate, C4-5.   3. Anterior cervical spine instrumentation with LDR plating, C4-  5.   4. Anterior plate removal, T2-0.   5. Bone marrow aspiration, C4 vertebra. 6. Continuous intraoperative fluoroscopy. SURGEON: Jeannine Marie. Joseph Fontanez MD.    ANESTHESIA: General endotracheal.    ESTIMATED BLOOD LOSS: Minimal.    PREPARATION: ChloraPrep. COMPLICATIONS: None. SPECIMENS REMOVED: C4-5  DISC    HISTORY OF PRESENT ILLNESS: A 31-year-old lady status post   cervical fusion at C5-6 13 years ago with good results. She   developed left upper extremity pain and weakness refractory to   conservative measures. MRI scanning and x-rays confirmed a   stable fusion at C5-6 with stenosis and disk protrusion at C4-5   centrally and to the left. Cord compression was noted. The top   half of the plate was encroaching the disk space and, therefore,   in order to access the disk space, the plate would have to be   removed. OPERATIVE NOTE: The patient was brought to the operating room,   was carefully placed under general endotracheal anesthesia   without complications, placed supine with a roll under the   shoulder blades, the head gently extended on a Berumdez pillow,   and the right side of the neck carefully prepped in the usual   sterile fashion.  The previous incision was outlined, infiltrated   with 1% Xylocaine with epinephrine, incised with a 15 blade, and   carried sharply through the platysma. Using rostral to caudal   subplatysmal dissection, a plane was created between the carotid   artery laterally and trachea and esophagus medially. Handheld   retractors were placed and the old tissue in the longus colli   muscles were stripped bilaterally. Lateral C-arm fluoroscopy   confirmed. An 18-gauge spinal needle inserted into the disk   space. Scar tissue was dissected off of the plate at X3-2. The   locking mechanism was disengaged and using a square screwdriver,   4 screws were removed without any complications. The screw holes   were packed with Surgicel for hemostasis. The plate was then   easily removed with a small hemostat. Bony fusion was noted. At   this point, the C4 vertebra was entered with this, a Derby   distracting pin awl, and using a 3 mL syringe and 14 gauge   Angiocath, approximately 3 mL of bone marrow were aspirated   and were mixed with OsteoAMP on the back table. The hole was   plugged with Nu-Knit Surgicel and bone wax. The Midas Bipin AMA   drill was used. The end-plates were drilled down to the   posterior longitudinal ligament which was opened with a 1 mm   Kerrison rongeur and the dura was widely decompressed   bilaterally. A ball tip probe was passed along the nerve roots   without further compression. The left side was tighter than the   right. A #7 LDR lordotic trial was tightly placed in the disk   space with good contact and distraction noted by fluoroscopy. A   #7 LDR ADRIANO-C cage was packed with OsteoAMP and bone marrow   aspirate was countersunk to a nice, tight fit using traction, a   bone impactor, and a mallet. The LDR plates were engaged first   inferiorly and then superiorly with 1 and 2-mm taps and a   mallet. The wound was irrigated with thrombin and during this   time, AP and lateral x-ray were obtained which confirmed good   position of the graft and hardware.  Additional thrombin was   placed for 2 minutes. No further bleeding was noted. FloSeal was   placed. The wound was dry and it was closed. The platysma was   closed tightly with interrupted 3-0 Vicryl and subcutaneous   tissues closed with interrupted 3-0 Vicryl. Skin was closed with   Dermabond. The patient tolerated the procedure well, was   awakened, extubated, and taken to PACU in stable condition. There were no obvious complications. DISCHARGE INSTRUCTIONS: Diet regularly. Activity up as   tolerated. No heavy lifting, bending, or automobile driving. Showers are permissible, but no baths. The patient will contact   the physician if she develops any fever, drainage, swelling,   cellulitis, or neurological change. Return visit in 10 to 14   days for wound check. DISCHARGE MEDICATIONS: Include admission medicines plus Percocet   7.5/325 every 8 hours p.r.n. DISCHARGE CONDITION: Satisfactory.         MD Lillian Ellis / Boris Cotton   D:  03/06/2017   08:58   T:  03/06/2017   14:25   Job #:  [id number]

## 2017-04-18 ENCOUNTER — HOSPITAL ENCOUNTER (OUTPATIENT)
Dept: GENERAL RADIOLOGY | Age: 57
Discharge: HOME | End: 2017-04-18
Payer: COMMERCIAL

## 2017-04-18 DIAGNOSIS — M48.02 CERVICAL STENOSIS OF SPINE: ICD-10-CM

## 2017-04-18 PROCEDURE — 72040 X-RAY EXAM NECK SPINE 2-3 VW: CPT

## 2017-04-25 ENCOUNTER — HOSPITAL ENCOUNTER (OUTPATIENT)
Dept: LAB | Age: 57
Discharge: HOME | End: 2017-04-25
Payer: COMMERCIAL

## 2017-04-25 DIAGNOSIS — C92.10 CML (CHRONIC MYELOID LEUKEMIA) (HCC): ICD-10-CM

## 2017-04-25 LAB
ALBUMIN SERPL BCP-MCNC: 3.9 G/DL (ref 3.5–5)
ALBUMIN/GLOB SERPL: 1.3 {RATIO} (ref 1.2–3.5)
ALP SERPL-CCNC: 75 U/L (ref 50–136)
ALT SERPL-CCNC: 26 U/L (ref 12–65)
ANION GAP BLD CALC-SCNC: 7 MMOL/L (ref 7–16)
AST SERPL W P-5'-P-CCNC: 18 U/L (ref 15–37)
BASOPHILS # BLD AUTO: 0.1 K/UL (ref 0–0.2)
BASOPHILS # BLD: 1 % (ref 0–2)
BILIRUB SERPL-MCNC: 0.6 MG/DL (ref 0.2–1.1)
BUN SERPL-MCNC: 11 MG/DL (ref 6–23)
CALCIUM SERPL-MCNC: 8.6 MG/DL (ref 8.3–10.4)
CHLORIDE SERPL-SCNC: 107 MMOL/L (ref 98–107)
CO2 SERPL-SCNC: 29 MMOL/L (ref 21–32)
CREAT SERPL-MCNC: 0.77 MG/DL (ref 0.6–1)
DIFFERENTIAL METHOD BLD: ABNORMAL
EOSINOPHIL # BLD: 0.3 K/UL (ref 0–0.8)
EOSINOPHIL NFR BLD: 5 % (ref 0.5–7.8)
ERYTHROCYTE [DISTWIDTH] IN BLOOD BY AUTOMATED COUNT: 14.7 % (ref 11.9–14.6)
GLOBULIN SER CALC-MCNC: 3.1 G/DL (ref 2.3–3.5)
GLUCOSE SERPL-MCNC: 108 MG/DL (ref 65–100)
HCT VFR BLD AUTO: 32.5 % (ref 35.8–46.3)
HGB BLD-MCNC: 10 G/DL (ref 11.7–15.4)
LYMPHOCYTES # BLD AUTO: 27 % (ref 13–44)
LYMPHOCYTES # BLD: 1.6 K/UL (ref 0.5–4.6)
MAGNESIUM SERPL-MCNC: 2.2 MG/DL (ref 1.8–2.4)
MCH RBC QN AUTO: 22.3 PG (ref 26.1–32.9)
MCHC RBC AUTO-ENTMCNC: 30.8 G/DL (ref 31.4–35)
MCV RBC AUTO: 72.4 FL (ref 79.6–97.8)
MONOCYTES # BLD: 0.5 K/UL (ref 0.1–1.3)
MONOCYTES NFR BLD AUTO: 8 % (ref 4–12)
NEUTS SEG # BLD: 3.4 K/UL (ref 1.7–8.2)
NEUTS SEG NFR BLD AUTO: 59 % (ref 43–78)
NRBC # BLD: 0 K/UL (ref 0–0.2)
PLATELET # BLD AUTO: 255 K/UL (ref 150–450)
PMV BLD AUTO: 10 FL (ref 10.8–14.1)
POTASSIUM SERPL-SCNC: 4.3 MMOL/L (ref 3.5–5.1)
PROT SERPL-MCNC: 7 G/DL (ref 6.3–8.2)
RBC # BLD AUTO: 4.49 M/UL (ref 4.05–5.25)
SODIUM SERPL-SCNC: 143 MMOL/L (ref 136–145)
WBC # BLD AUTO: 5.8 K/UL (ref 4.3–11.1)

## 2017-04-25 PROCEDURE — 36415 COLL VENOUS BLD VENIPUNCTURE: CPT | Performed by: INTERNAL MEDICINE

## 2017-04-25 PROCEDURE — 80053 COMPREHEN METABOLIC PANEL: CPT | Performed by: INTERNAL MEDICINE

## 2017-04-25 PROCEDURE — 83735 ASSAY OF MAGNESIUM: CPT | Performed by: INTERNAL MEDICINE

## 2017-04-25 PROCEDURE — 85025 COMPLETE CBC W/AUTO DIFF WBC: CPT | Performed by: INTERNAL MEDICINE

## 2017-04-28 LAB
Lab: NORMAL
REFERENCE LAB,REFLB: NORMAL
TEST DESCRIPTION:,ATST: NORMAL

## 2017-05-24 ENCOUNTER — PATIENT OUTREACH (OUTPATIENT)
Dept: CASE MANAGEMENT | Age: 57
End: 2017-05-24

## 2017-05-24 NOTE — PROGRESS NOTES
Pt seen and labs (drawn in April) reviewed by Dr. Maris Stout. Dr Maris Stout discussed with patient that she is in a complete molecular response. Pt reports feeling well and no side effects from [address]. Will continue current dose and return in 3 months. Pt aware of future appts.

## 2017-07-18 ENCOUNTER — HOSPITAL ENCOUNTER (OUTPATIENT)
Dept: GENERAL RADIOLOGY | Age: 57
Discharge: HOME | End: 2017-07-18
Payer: COMMERCIAL

## 2017-07-18 DIAGNOSIS — M48.02 CERVICAL STENOSIS OF SPINE: ICD-10-CM

## 2017-07-18 PROBLEM — M50.30 DDD (DEGENERATIVE DISC DISEASE), CERVICAL: Status: ACTIVE | Noted: 2017-07-18

## 2017-07-18 PROBLEM — M54.2 NECK PAIN: Status: ACTIVE | Noted: 2017-07-18

## 2017-07-18 PROCEDURE — 72040 X-RAY EXAM NECK SPINE 2-3 VW: CPT

## 2017-07-25 PROBLEM — R73.03 PREDIABETES: Status: ACTIVE | Noted: 2017-07-25

## 2017-07-26 PROBLEM — E78.5 HYPERLIPIDEMIA: Status: ACTIVE | Noted: 2017-07-26

## 2017-07-30 ENCOUNTER — APPOINTMENT (OUTPATIENT)
Dept: GENERAL RADIOLOGY | Age: 57
End: 2017-07-30
Attending: EMERGENCY MEDICINE
Payer: COMMERCIAL

## 2017-07-30 ENCOUNTER — HOSPITAL ENCOUNTER (EMERGENCY)
Age: 57
Discharge: HOME | End: 2017-07-30
Attending: EMERGENCY MEDICINE
Payer: COMMERCIAL

## 2017-07-30 VITALS
RESPIRATION RATE: 16 BRPM | TEMPERATURE: 98.6 F | BODY MASS INDEX: 34.49 KG/M2 | DIASTOLIC BLOOD PRESSURE: 76 MMHG | OXYGEN SATURATION: 97 % | HEIGHT: 65 IN | SYSTOLIC BLOOD PRESSURE: 138 MMHG | HEART RATE: 84 BPM | WEIGHT: 207 LBS

## 2017-07-30 DIAGNOSIS — S62.101A WRIST FRACTURE, RIGHT, CLOSED, INITIAL ENCOUNTER: Primary | ICD-10-CM

## 2017-07-30 PROCEDURE — 75810000053 HC SPLINT APPLICATION: Performed by: EMERGENCY MEDICINE

## 2017-07-30 PROCEDURE — 74011250637 HC RX REV CODE- 250/637: Performed by: EMERGENCY MEDICINE

## 2017-07-30 PROCEDURE — 74011250636 HC RX REV CODE- 250/636: Performed by: EMERGENCY MEDICINE

## 2017-07-30 PROCEDURE — 99283 EMERGENCY DEPT VISIT LOW MDM: CPT | Performed by: EMERGENCY MEDICINE

## 2017-07-30 PROCEDURE — 73110 X-RAY EXAM OF WRIST: CPT

## 2017-07-30 PROCEDURE — 96372 THER/PROPH/DIAG INJ SC/IM: CPT | Performed by: EMERGENCY MEDICINE

## 2017-07-30 RX ORDER — ONDANSETRON 4 MG/1
4 TABLET, ORALLY DISINTEGRATING ORAL
Status: COMPLETED | OUTPATIENT
Start: 2017-07-30 | End: 2017-07-30

## 2017-07-30 RX ORDER — HYDROMORPHONE HYDROCHLORIDE 1 MG/ML
1 INJECTION, SOLUTION INTRAMUSCULAR; INTRAVENOUS; SUBCUTANEOUS
Status: COMPLETED | OUTPATIENT
Start: 2017-07-30 | End: 2017-07-30

## 2017-07-30 RX ORDER — TRAMADOL HYDROCHLORIDE 50 MG/1
50 TABLET ORAL
Qty: 20 TAB | Refills: 0 | Status: SHIPPED | OUTPATIENT
Start: 2017-07-30 | End: 2017-09-01 | Stop reason: SDUPTHER

## 2017-07-30 RX ADMIN — ONDANSETRON 4 MG: 4 TABLET, ORALLY DISINTEGRATING ORAL at 17:55

## 2017-07-30 RX ADMIN — HYDROMORPHONE HYDROCHLORIDE 1 MG: 1 INJECTION, SOLUTION INTRAMUSCULAR; INTRAVENOUS; SUBCUTANEOUS at 17:55

## 2017-07-30 NOTE — DISCHARGE INSTRUCTIONS
Broken Wrist: Care Instructions  Your Care Instructions    Your wrist can break, or fracture, during sports, a fall, or other accidents. The break may happen when your wrist is hit or is used to protect you in a fall. Fractures can range from a small, hairline crack, to a bone or bones broken into two or more pieces. Your treatment depends on how bad the break is. Your doctor may have put your wrist in a cast or splint. This will help keep your wrist stable until your follow-up appointment. It may take weeks or months for your wrist to heal. You can help it heal with care at home. You heal best when you take good care of yourself. Eat a variety of healthy foods, and don't smoke. Follow-up care is a key part of your treatment and safety. Be sure to make and go to all appointments, and call your doctor if you are having problems. It's also a good idea to know your test results and keep a list of the medicines you take. How can you care for yourself at home? · Put ice or a cold pack on your wrist for 10 to 20 minutes at a time. Try to do this every 1 to 2 hours for the next 3 days (when you are awake). Put a thin cloth between the ice and your cast or splint. Keep your cast or splint dry. · Follow the splint or cast care instructions your doctor gives you. If you have a splint, do not take it off unless your doctor tells you to. Be careful not to put the splint on too tight. · Be safe with medicines. Take pain medicines exactly as directed. ¨ If the doctor gave you a prescription medicine for pain, take it as prescribed. ¨ If you are not taking a prescription pain medicine, ask your doctor if you can take an over-the-counter medicine. · Prop up your wrist on pillows when you sit or lie down in the first few days after the injury. Keep your wrist higher than the level of your heart. This will help reduce swelling.   · Move your fingers often to reduce swelling and stiffness, but do not use that hand to grab or carry anything. · Follow instructions for exercises to keep your arm strong. When should you call for help? Call your doctor now or seek immediate medical care if:  · You have increased or severe pain. · Your cast or splint feels too tight. · You cannot move your fingers. · You have tingling, weakness, or numbness in your hand and fingers. · Your hand and fingers are cool or pale or change color. · You have a lot of swelling near your cast or splint. · The skin under your cast or splint is burning or stinging. Watch closely for changes in your health, and be sure to contact your doctor if:  · You do not get better as expected. Where can you learn more? Go to http://gibson-tory.info/. Enter [id number] in the search box to learn more about \"Broken Wrist: Care Instructions. \"  Current as of: March 21, 2017  Content Version: 11.3  © 3365-6937 [employer] Care instructions adapted under license by [employer] (which disclaims liability or warranty for this information). If you have questions about a medical condition or this instruction, always ask your healthcare professional. [address] any warranty or liability for your use of this information.

## 2017-07-30 NOTE — ED PROVIDER NOTES
HPI Comments: Patient tripped and fell and heart her right wrist.  She did not hit her head and had no loss of consciousness. She has swelling of the right wrist and the ice as helps. Patient is a 65 y.o. female presenting with wrist pain and fall. The history is provided by the patient. Wrist Pain    This is a new problem. The current episode started 1 to 2 hours ago. The problem occurs constantly. The pain is present in the right wrist. The quality of the pain is described as pounding and aching. The pain is moderate. Associated symptoms include limited range of motion, stiffness and neck pain. Pertinent negatives include no numbness, no itching and no back pain. The symptoms are aggravated by palpation and activity. She has tried nothing for the symptoms. There has been a history of trauma. Fall   Pertinent negatives include no numbness.         Past Medical History:   Diagnosis Date    Anemia 2/21/2017    Arthritis     Beta thalassemia (HCC)     Beta+ thalassemia (HCC)     Chronic pain     neck left arm and shoulder    CML (chronic myeloid leukemia) ([facility] [address] 3/8/2016    patient states she is stable at this time    DDD (degenerative disc disease), lumbar     herniated disc L2-3, L5-S1 (leaking)-treatment w/epidural injection    Diverticulosis of colon (without mention of hemorrhage) 2015    Hypothyroid     levothyroxine for hypo    Insomnia     Internal hemorrhoids without mention of complication 3488    Migraine headache     PUD (peptic ulcer disease) 2002ish    GI bleed s/p use of celebrex; no other issues    Rectocele 2015    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    Spinal stenosis, cervical region        Past Surgical History:   Procedure Laterality Date    HX APPENDECTOMY  2007    HX CERVICAL FUSION  2004    C4-5,C5-6    HX COLONOSCOPY  last 1/19/15    Sunitha--no polyps--7-10 year recall    HX CYSTOCELE REPAIR  1991    HX HYSTERECTOMY  1991    with cystocele and rectocele    HX KNEE REPLACEMENT Right 2011    HX RECTOCELE REPAIR  1991    HX TUBAL LIGATION  1986    HX WISDOM TEETH EXTRACTION  1979    [place] ANESTH,SURGERY OF SHOULDER Left 1993         Family History:   Problem Relation Age of Onset    Hypertension Mother     Thyroid Disease Mother     Arthritis-osteo Mother     Heart Disease Father     Hypertension Father     Diabetes Father     Stroke Father     Dementia Father     Parkinson's Disease Father     Breast Cancer Maternal Aunt        Social History     Social History    Marital status:      Spouse name: N/A    Number of children: N/A    Years of education: N/A     Occupational History    Not on file. Social History Main Topics    Smoking status: Never Smoker    Smokeless tobacco: Never Used    Alcohol use No    Drug use: No    Sexual activity: Yes     Birth control/ protection: Surgical     Other Topics Concern    Not on file     Social History Narrative         ALLERGIES: Latex, natural rubber; Latex; Hydrocodone-acetaminophen; Meperidine; and Other medication    Review of Systems   Musculoskeletal: Positive for neck pain and stiffness. Negative for back pain. Skin: Negative for itching, rash and wound. Neurological: Negative for numbness. Vitals:    07/30/17 1519   BP: 146/79   Pulse: 94   Resp: 16   Temp: 98.8 °F (37.1 °C)   SpO2: 95%   Weight: 93.9 kg (207 lb)   Height: 5' 5\" (1.651 m)            Physical Exam   Constitutional: She is oriented to person, place, and time. She appears well-developed and well-nourished. No distress. HENT:   Head: Normocephalic and atraumatic. Musculoskeletal: She exhibits edema and tenderness. She exhibits no deformity. Able move all of her fingers has normal cap refill and normal distal radial pulse; ecchymosis and swelling of the distal radius ulna   Neurological: She is alert and oriented to person, place, and time. No cranial nerve deficit. Skin: Skin is warm and dry.  No rash noted. She is not diaphoretic. No erythema. No pallor. Nursing note and vitals reviewed. MDM  Number of Diagnoses or Management Options  Wrist fracture, right, closed, initial encounter:   Diagnosis management comments: multiple distal radius/ulna fractures. Placed in a sugar tong splint and given a shot of Dilaudid and Zofran and has OxyContin at home and requested only a prescription of tramadol which she typically takes for pain.   Follow-up with orthopedist discussed with Dr. Pato Duvall        Amount and/or Complexity of Data Reviewed  Tests in the radiology section of CPT®: ordered and reviewed    Risk of Complications, Morbidity, and/or Mortality  Presenting problems: moderate  Diagnostic procedures: moderate  Management options: moderate    Patient Progress  Patient progress: improved    ED Course       Procedures

## 2017-07-31 ENCOUNTER — PATIENT OUTREACH (OUTPATIENT)
Dept: OTHER | Age: 57
End: 2017-07-31

## 2017-07-31 NOTE — PROGRESS NOTES
Transition Of Care Note    Patient discharged from [facility] ED visit for right wrist fracture after a fall. Medical History:     Past Medical History:   Diagnosis Date    Anemia 2017    Arthritis     Beta thalassemia (HCC)     Beta+ thalassemia (HCC)     Chronic pain     neck left arm and shoulder    CML (chronic myeloid leukemia) ([facility] [address] 3/8/2016    patient states she is stable at this time    DDD (degenerative disc disease), lumbar     herniated disc L2-3, L5-S1 (leaking)-treatment w/epidural injection    Diverticulosis of colon (without mention of hemorrhage)     Hypothyroid     levothyroxine for hypo    Insomnia     Internal hemorrhoids without mention of complication     Migraine headache     PUD (peptic ulcer disease) 2002ish    GI bleed s/p use of celebrex; no other issues    Rectocele     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 Verified  and zip code with patient as identifiers. Provided introduction to self, and explanation of the Nurse Care Manager role. Spoke with patient who is staying with her son at his apartment in [address] [place] [address]. I added his address in . She is in significant pain 8/10. I inquired about ice, but she states the cold is unable to penetrate the cast.  She is keeping the right wrist elevated. She is taking her Percocet one pill every 8 hrs that she has left over from her spinal procedure a few months ago. I asked if she was taking any anti-inflammatory meds, but she was told not to take the Tramadol with the Percocet per the ED nurse on DC. The percocet helps her pain until 6hrs out from taking the medication, the pain then significantly returns without relief. She states the Ortho practice is unable to see her until Thursday but she will run out of Percocet before that time.      She requested and I offered to place a call to her PCP office to either call her at her son's apartment on her cell or call me to explain what is happening with the patient currently and her need for probable additional pain medication. Also to notify PCP office of ER visit and fall yesterday. She also stated he could  a prescription,  but they are in [place], [place], a long way from a [facility]. I placed a call to her PCP and left her a message to call either myself or the patient back and explained the above details. The office stated the medical assistant would return the call. Medication:   Performed medication reconciliation with patient, and patient verbalizes understanding of administration of home medications. There were no barriers to obtaining medications identified at this time. Support system:  patient    Discharge Instructions :  Reviewed discharge instructions with patient. Patient verbalizes understanding of discharge instructions and follow-up care. Red Flags:  · You have increased or severe pain. · Your cast or splint feels too tight. · You cannot move your fingers. · You have tingling, weakness, or numbness in your hand and fingers. · Your hand and fingers are cool or pale or change color. · You have a lot of swelling near your cast or splint. · The skin under your cast or splint is burning or stinging. Advance Care Planning:   Patient was offered the opportunity to discuss advance care planning:  no     Does patient have an Advance Directive:  no   If no, did you provide information on Caring Connections?  no     PCP/Specialist follow up: Patient scheduled to follow up with [address] [place] [address] on Thursday this week. Reviewed red flags with patient, and patient verbalizes understanding. Patient given an opportunity to ask questions. No other clinical/social/functional needs noted. The patient agrees to contact the PCP office for questions related to their healthcare.   The patient expressed thanks, offered no additional questions and ended the call.

## 2017-08-21 ENCOUNTER — PATIENT OUTREACH (OUTPATIENT)
Dept: INTERNAL MEDICINE CLINIC | Age: 57
End: 2017-08-21

## 2017-08-22 ENCOUNTER — HOSPITAL ENCOUNTER (OUTPATIENT)
Dept: LAB | Age: 57
Discharge: HOME | End: 2017-08-22
Payer: COMMERCIAL

## 2017-08-22 DIAGNOSIS — C92.10 CML (CHRONIC MYELOID LEUKEMIA) (HCC): ICD-10-CM

## 2017-08-22 LAB
ALBUMIN SERPL-MCNC: 3.6 G/DL (ref 3.5–5)
ALBUMIN/GLOB SERPL: 1.2 {RATIO} (ref 1.2–3.5)
ALP SERPL-CCNC: 82 U/L (ref 50–136)
ALT SERPL-CCNC: 24 U/L (ref 12–65)
ANION GAP SERPL CALC-SCNC: 4 MMOL/L (ref 7–16)
AST SERPL-CCNC: 19 U/L (ref 15–37)
BASOPHILS # BLD: 0.1 K/UL (ref 0–0.2)
BASOPHILS NFR BLD: 1 % (ref 0–2)
BILIRUB SERPL-MCNC: 0.5 MG/DL (ref 0.2–1.1)
BUN SERPL-MCNC: 14 MG/DL (ref 6–23)
CALCIUM SERPL-MCNC: 8.4 MG/DL (ref 8.3–10.4)
CHLORIDE SERPL-SCNC: 106 MMOL/L (ref 98–107)
CO2 SERPL-SCNC: 29 MMOL/L (ref 21–32)
CREAT SERPL-MCNC: 0.87 MG/DL (ref 0.6–1)
DIFFERENTIAL METHOD BLD: ABNORMAL
EOSINOPHIL # BLD: 0.3 K/UL (ref 0–0.8)
EOSINOPHIL NFR BLD: 4 % (ref 0.5–7.8)
ERYTHROCYTE [DISTWIDTH] IN BLOOD BY AUTOMATED COUNT: 15.4 % (ref 11.9–14.6)
GLOBULIN SER CALC-MCNC: 3 G/DL (ref 2.3–3.5)
GLUCOSE SERPL-MCNC: 191 MG/DL (ref 65–100)
HCT VFR BLD AUTO: 30 % (ref 35.8–46.3)
HGB BLD-MCNC: 9.4 G/DL (ref 11.7–15.4)
LYMPHOCYTES # BLD: 2 K/UL (ref 0.5–4.6)
LYMPHOCYTES NFR BLD: 29 % (ref 13–44)
MAGNESIUM SERPL-MCNC: 1.9 MG/DL (ref 1.8–2.4)
MCH RBC QN AUTO: 22.6 PG (ref 26.1–32.9)
MCHC RBC AUTO-ENTMCNC: 31.3 G/DL (ref 31.4–35)
MCV RBC AUTO: 72.1 FL (ref 79.6–97.8)
MONOCYTES # BLD: 0.7 K/UL (ref 0.1–1.3)
MONOCYTES NFR BLD: 10 % (ref 4–12)
NEUTS SEG # BLD: 4 K/UL (ref 1.7–8.2)
NEUTS SEG NFR BLD: 57 % (ref 43–78)
NRBC # BLD: 0.01 K/UL (ref 0–0.2)
PLATELET # BLD AUTO: 270 K/UL (ref 150–450)
PMV BLD AUTO: 10.3 FL (ref 10.8–14.1)
POTASSIUM SERPL-SCNC: 4.1 MMOL/L (ref 3.5–5.1)
PROT SERPL-MCNC: 6.6 G/DL (ref 6.3–8.2)
RBC # BLD AUTO: 4.16 M/UL (ref 4.05–5.25)
SODIUM SERPL-SCNC: 139 MMOL/L (ref 136–145)
WBC # BLD AUTO: 7.1 K/UL (ref 4.3–11.1)

## 2017-08-22 PROCEDURE — 36415 COLL VENOUS BLD VENIPUNCTURE: CPT | Performed by: INTERNAL MEDICINE

## 2017-08-22 PROCEDURE — 80053 COMPREHEN METABOLIC PANEL: CPT | Performed by: INTERNAL MEDICINE

## 2017-08-22 PROCEDURE — 83735 ASSAY OF MAGNESIUM: CPT | Performed by: INTERNAL MEDICINE

## 2017-08-22 PROCEDURE — 85025 COMPLETE CBC W/AUTO DIFF WBC: CPT | Performed by: INTERNAL MEDICINE

## 2017-08-29 ENCOUNTER — PATIENT OUTREACH (OUTPATIENT)
Dept: CASE MANAGEMENT | Age: 57
End: 2017-08-29

## 2017-08-29 LAB
Lab: NORMAL
REFERENCE LAB,REFLB: NORMAL
TEST DESCRIPTION:,ATST: NORMAL

## 2017-08-29 NOTE — PROGRESS NOTES
8/29/17:  Patient in for 3 month follow up. Patient doing well minus broken wrist that she has in a cast.  BCR-abl negative. Patient to continue Gleevac 400mg daily. Navigation signing off for now and available in the future if needed.

## 2017-09-18 ENCOUNTER — PATIENT OUTREACH (OUTPATIENT)
Dept: OTHER | Age: 57
End: 2017-09-18

## 2017-09-18 NOTE — PROGRESS NOTES
Patient identified as eligible for [address] [place] [address] services. Second telephone outreach attempted. Left discreet voicemail with this CM confidential contact information. Will attempt to call back later today after her work hours.

## 2017-09-18 NOTE — PROGRESS NOTES
[facility] progress note    Patient eligible for [employer] care management    PMH:   Past Medical History:   Diagnosis Date    Anemia 2/21/2017    Arthritis     Beta thalassemia (HCC)     Beta+ thalassemia (HCC)     Chronic pain     neck left arm and shoulder    CML (chronic myeloid leukemia) ([facility] [address] 3/8/2016    patient states she is stable at this time    DDD (degenerative disc disease), lumbar     herniated disc L2-3, L5-S1 (leaking)-treatment w/epidural injection    Diverticulosis of colon (without mention of hemorrhage) 2015    Hypothyroid     levothyroxine for hypo    Insomnia     Internal hemorrhoids without mention of complication 1931    Migraine headache     PUD (peptic ulcer disease) 2002ish    GI bleed s/p use of celebrex; no other issues    Rectocele 2015    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    Spinal stenosis, cervical region        Social History:   Social History     Social History    Marital status:      Spouse name: N/A    Number of children: N/A    Years of education: N/A     Occupational History    Not on file. Social History Main Topics    Smoking status: Never Smoker    Smokeless tobacco: Never Used    Alcohol use No    Drug use: No    Sexual activity: Yes     Birth control/ protection: Surgical     Other Topics Concern    Not on file     Social History Narrative       Two patient identifiers verified. Discussed the care management program.  Patient agrees to care management services at this time. Patient has significant diagnosis of Chronic pain and Spinal Stenosis . Care management assessment completed:    Patient stated problem: \"Weakness with back pain\"  Patient stated Strength: \"Provider's support\"   Patient stated Weakness: \"Constant pain\"    Patient current understanding of Chronic back pain. How does it impact daily life? Difficulty with working.     Emotional Status/Adjustment to Health State: Fair    Barriers/Challenges to Care: Financial, Transportation at times    Patient identified Short Term Goal Try to get pain controlled, strength, better balance    Patient identified Long Term Goal : Walk and function without pain and weakness    Support System/Resources: Son and daughter in law    Advance Care Planning: N/A     ADLS/DME: Has a cane she uses on occasion    Referrals: Neurosurgeon    Self-management of medications and adherence: Motivation for change:  Asked patient, \"on a scale of 0-10, when 0 is no motivation and 10 is the most motivation, How motivated are you to make changes to manage your back better\"    Next scheduled follow up with Dr. Patti Beltran is 10/3/17. Next planned call from CM in one week. Care Plan for Chronic Disease:    1. Diagnosis 1- Spinal Stenosis    2. Diagnosis 2- DDD    3. Key pt activities to achieve better health: possible rehab, rest    4. Remove barriers/challenges affecting patient's health: physical stresses      Patient verbalized understanding of all information discussed. Pt has my contact information for any further questions, concerns, or needs.     Plan for next call:

## 2017-10-04 ENCOUNTER — PATIENT OUTREACH (OUTPATIENT)
Dept: OTHER | Age: 57
End: 2017-10-04

## 2017-10-04 NOTE — PROGRESS NOTES
Call to patient for CCM. Patient states she has seen her pain management provider and discussed treatment options vs disability process. After discussion with him she has an appointment for another Epidural Steroid injection next Friday 10/13/17 to see if that will give her relief of back pain to be able to return to work. Patient states she is continuing to walk and try to get her steps in as she can. Patient is watching her calorie intake as well. Patient states she has been compliant with not lifting heavy objects, and taking medications as prescribed. Agreed on plan to call back in 2 weeks after this next Epidural injection.

## 2017-11-21 ENCOUNTER — HOSPITAL ENCOUNTER (OUTPATIENT)
Dept: CT IMAGING | Age: 57
Discharge: HOME | End: 2017-11-21
Attending: ORTHOPAEDIC SURGERY
Payer: COMMERCIAL

## 2017-11-21 DIAGNOSIS — M25.531 RIGHT WRIST PAIN: ICD-10-CM

## 2017-11-21 DIAGNOSIS — M25.639 DECREASED ROM OF WRIST: ICD-10-CM

## 2017-11-21 PROCEDURE — 73200 CT UPPER EXTREMITY W/O DYE: CPT

## 2017-11-30 ENCOUNTER — HOSPITAL ENCOUNTER (OUTPATIENT)
Dept: LAB | Age: 57
Discharge: HOME | End: 2017-11-30
Payer: COMMERCIAL

## 2017-11-30 DIAGNOSIS — C92.10 CML (CHRONIC MYELOID LEUKEMIA) (HCC): ICD-10-CM

## 2017-11-30 LAB
ALBUMIN SERPL-MCNC: 3.6 G/DL (ref 3.5–5)
ALBUMIN/GLOB SERPL: 1.2 {RATIO} (ref 1.2–3.5)
ALP SERPL-CCNC: 75 U/L (ref 50–136)
ALT SERPL-CCNC: 24 U/L (ref 12–65)
ANION GAP SERPL CALC-SCNC: 9 MMOL/L (ref 7–16)
AST SERPL-CCNC: 17 U/L (ref 15–37)
BASOPHILS # BLD: 0.1 K/UL (ref 0–0.2)
BASOPHILS NFR BLD: 1 % (ref 0–2)
BILIRUB SERPL-MCNC: 0.5 MG/DL (ref 0.2–1.1)
BUN SERPL-MCNC: 11 MG/DL (ref 6–23)
CALCIUM SERPL-MCNC: 8.7 MG/DL (ref 8.3–10.4)
CHLORIDE SERPL-SCNC: 106 MMOL/L (ref 98–107)
CO2 SERPL-SCNC: 28 MMOL/L (ref 21–32)
CREAT SERPL-MCNC: 0.82 MG/DL (ref 0.6–1)
DIFFERENTIAL METHOD BLD: ABNORMAL
EOSINOPHIL # BLD: 0.2 K/UL (ref 0–0.8)
EOSINOPHIL NFR BLD: 3 % (ref 0.5–7.8)
ERYTHROCYTE [DISTWIDTH] IN BLOOD BY AUTOMATED COUNT: 15.3 % (ref 11.9–14.6)
GLOBULIN SER CALC-MCNC: 3.1 G/DL (ref 2.3–3.5)
GLUCOSE SERPL-MCNC: 156 MG/DL (ref 65–100)
HCT VFR BLD AUTO: 32.1 % (ref 35.8–46.3)
HGB BLD-MCNC: 10.1 G/DL (ref 11.7–15.4)
LYMPHOCYTES # BLD: 1.6 K/UL (ref 0.5–4.6)
LYMPHOCYTES NFR BLD: 25 % (ref 13–44)
MAGNESIUM SERPL-MCNC: 1.9 MG/DL (ref 1.8–2.4)
MCH RBC QN AUTO: 22.3 PG (ref 26.1–32.9)
MCHC RBC AUTO-ENTMCNC: 31.5 G/DL (ref 31.4–35)
MCV RBC AUTO: 71 FL (ref 79.6–97.8)
MONOCYTES # BLD: 0.6 K/UL (ref 0.1–1.3)
MONOCYTES NFR BLD: 9 % (ref 4–12)
NEUTS SEG # BLD: 4.1 K/UL (ref 1.7–8.2)
NEUTS SEG NFR BLD: 63 % (ref 43–78)
NRBC # BLD: 0.01 K/UL (ref 0–0.2)
PLATELET # BLD AUTO: 251 K/UL (ref 150–450)
PMV BLD AUTO: 10.2 FL (ref 10.8–14.1)
POTASSIUM SERPL-SCNC: 3.7 MMOL/L (ref 3.5–5.1)
PROT SERPL-MCNC: 6.7 G/DL (ref 6.3–8.2)
RBC # BLD AUTO: 4.52 M/UL (ref 4.05–5.25)
SODIUM SERPL-SCNC: 143 MMOL/L (ref 136–145)
WBC # BLD AUTO: 6.5 K/UL (ref 4.3–11.1)

## 2017-11-30 PROCEDURE — 85025 COMPLETE CBC W/AUTO DIFF WBC: CPT | Performed by: INTERNAL MEDICINE

## 2017-11-30 PROCEDURE — 83735 ASSAY OF MAGNESIUM: CPT | Performed by: INTERNAL MEDICINE

## 2017-11-30 PROCEDURE — 36415 COLL VENOUS BLD VENIPUNCTURE: CPT | Performed by: INTERNAL MEDICINE

## 2017-11-30 PROCEDURE — 80053 COMPREHEN METABOLIC PANEL: CPT | Performed by: INTERNAL MEDICINE

## 2017-12-06 LAB
Lab: NORMAL
REFERENCE LAB,REFLB: NORMAL
TEST DESCRIPTION:,ATST: NORMAL

## 2017-12-14 ENCOUNTER — HOSPITAL ENCOUNTER (OUTPATIENT)
Dept: MRI IMAGING | Age: 57
Discharge: HOME | End: 2017-12-14
Attending: PHYSICAL MEDICINE & REHABILITATION
Payer: COMMERCIAL

## 2017-12-14 DIAGNOSIS — M54.17 LUMBOSACRAL RADICULOPATHY: ICD-10-CM

## 2017-12-14 DIAGNOSIS — M48.00 SPINAL STENOSIS: ICD-10-CM

## 2017-12-14 PROCEDURE — 72148 MRI LUMBAR SPINE W/O DYE: CPT

## 2017-12-27 ENCOUNTER — PATIENT OUTREACH (OUTPATIENT)
Dept: OTHER | Age: 57
End: 2017-12-27

## 2017-12-27 NOTE — PROGRESS NOTES
Outreach call to patient for CM. Patient states her last steroid Epidural injection only lasted 5 weeks. She has ongoing back pain and weakness in her legs at times. She fell recently and has a small break in her wrist, no surgery yet. Was seen and told to wait and see if it heals, she is still having pain with it. She had an Lumbar spine MRI done last week and an appointment with Neurosurgeon Dr Paramjit Mccormick to determine if she is a candidate for surgery. The other option is pain management for a spinal cord stimulator placement which she is at present not interested in. Discussed [employer] and points earned by participating in CM. She is trying to stay as active as possible given her back pain/leg weakness. Watching dietary intake. Agreed to follow up call after her appointment with Dr Paramjit Mccormick to discuss. Will continue to follow.

## 2018-01-18 ENCOUNTER — PATIENT OUTREACH (OUTPATIENT)
Dept: OTHER | Age: 58
End: 2018-01-18

## 2018-01-18 NOTE — PROGRESS NOTES
Outreach call to patient for CM. Patient states she is hurting but continues to try her best to do what she has to do for work and at home. Taking her pain meds as directed. She was scheduled to see Neurosurgeon Dr Jodie Cuevas this month however his office rescheduled her appointment for Feb 1, 2018. She has not been to the ED/UC for pain in the last few months. Planned CM call for 2/2 to discuss results of this appointment. Patient in agreement and will call me before if she needs to.

## 2018-02-01 PROBLEM — M48.061 LUMBAR STENOSIS WITHOUT NEUROGENIC CLAUDICATION: Status: ACTIVE | Noted: 2018-02-01

## 2018-02-01 PROBLEM — G89.29 CHRONIC BILATERAL LOW BACK PAIN WITH BILATERAL SCIATICA: Status: ACTIVE | Noted: 2018-02-01

## 2018-02-01 PROBLEM — M54.41 CHRONIC BILATERAL LOW BACK PAIN WITH BILATERAL SCIATICA: Status: ACTIVE | Noted: 2018-02-01

## 2018-02-01 PROBLEM — M54.42 CHRONIC BILATERAL LOW BACK PAIN WITH BILATERAL SCIATICA: Status: ACTIVE | Noted: 2018-02-01

## 2018-02-21 ENCOUNTER — PATIENT OUTREACH (OUTPATIENT)
Dept: OTHER | Age: 58
End: 2018-02-21

## 2018-02-21 NOTE — PROGRESS NOTES
Outreach call to patient to discuss her appointment with Dr Miranda Roberts (Neurosurgery). Left a discreet vm to offer assistance with plan of care and that I will call again this week/she can return my call as well. As noted by provider's plan will offer assistance as needed    Per provider notes: \"The patient has widespread degenerative disc disease in the lumbar spine and I am not optimistic that at L3-4 fusion will help her back and hip pain. L3-4 discectomy may help the lower extremity discomfort however this is the minor issue at this time.     Referral will be made for physical medicine and rehabilitation evaluation. She does have significant obesity and a long-standing history of back pain. I do not believe that she would fare well with lumbar spine fusion surgery at this time. \"    Will continue to follow.

## 2018-02-28 ENCOUNTER — HOSPITAL ENCOUNTER (OUTPATIENT)
Dept: LAB | Age: 58
Discharge: HOME | End: 2018-02-28
Payer: COMMERCIAL

## 2018-02-28 DIAGNOSIS — C92.10 CML (CHRONIC MYELOID LEUKEMIA) (HCC): ICD-10-CM

## 2018-02-28 LAB
ALBUMIN SERPL-MCNC: 3.8 G/DL (ref 3.5–5)
ALBUMIN/GLOB SERPL: 1 {RATIO}
ALP SERPL-CCNC: 84 U/L (ref 50–136)
ALT SERPL-CCNC: 24 U/L (ref 12–65)
ANION GAP SERPL CALC-SCNC: 6 MMOL/L
AST SERPL-CCNC: 22 U/L (ref 15–37)
BASOPHILS # BLD: 0.1 K/UL (ref 0–0.2)
BASOPHILS NFR BLD: 1 % (ref 0–2)
BILIRUB SERPL-MCNC: 0.4 MG/DL (ref 0.2–1.1)
BUN SERPL-MCNC: 11 MG/DL (ref 6–23)
CALCIUM SERPL-MCNC: 8.7 MG/DL (ref 8.3–10.4)
CHLORIDE SERPL-SCNC: 106 MMOL/L (ref 98–107)
CO2 SERPL-SCNC: 30 MMOL/L (ref 21–32)
CREAT SERPL-MCNC: 0.8 MG/DL (ref 0.6–1)
DIFFERENTIAL METHOD BLD: ABNORMAL
EOSINOPHIL # BLD: 0.3 K/UL (ref 0–0.8)
EOSINOPHIL NFR BLD: 4 % (ref 0.5–7.8)
ERYTHROCYTE [DISTWIDTH] IN BLOOD BY AUTOMATED COUNT: 15.4 % (ref 11.9–14.6)
GLOBULIN SER CALC-MCNC: 3.7 G/DL
GLUCOSE SERPL-MCNC: 132 MG/DL (ref 65–100)
HCT VFR BLD AUTO: 33.9 % (ref 35.8–46.3)
HGB BLD-MCNC: 10.6 G/DL (ref 11.7–15.4)
LYMPHOCYTES # BLD: 2 K/UL (ref 0.5–4.6)
LYMPHOCYTES NFR BLD: 30 % (ref 13–44)
MAGNESIUM SERPL-MCNC: 2 MG/DL (ref 1.8–2.4)
MCH RBC QN AUTO: 22.3 PG (ref 26.1–32.9)
MCHC RBC AUTO-ENTMCNC: 31.3 G/DL (ref 31.4–35)
MCV RBC AUTO: 71.4 FL (ref 79.6–97.8)
MONOCYTES # BLD: 0.5 K/UL (ref 0.1–1.3)
MONOCYTES NFR BLD: 8 % (ref 4–12)
NEUTS SEG # BLD: 3.7 K/UL (ref 1.7–8.2)
NEUTS SEG NFR BLD: 57 % (ref 43–78)
NRBC # BLD: 0 K/UL (ref 0–0.2)
PLATELET # BLD AUTO: 267 K/UL (ref 150–450)
PMV BLD AUTO: 10.1 FL (ref 10.8–14.1)
POTASSIUM SERPL-SCNC: 4.1 MMOL/L (ref 3.5–5.1)
PROT SERPL-MCNC: 7.5 G/DL (ref 6.3–8.2)
RBC # BLD AUTO: 4.75 M/UL (ref 4.05–5.25)
REFERENCE LAB,REFLB: NORMAL
SODIUM SERPL-SCNC: 142 MMOL/L (ref 136–145)
TEST DESCRIPTION:,ATST: NORMAL
WBC # BLD AUTO: 6.5 K/UL (ref 4.3–11.1)

## 2018-02-28 PROCEDURE — 83735 ASSAY OF MAGNESIUM: CPT | Performed by: INTERNAL MEDICINE

## 2018-02-28 PROCEDURE — 36415 COLL VENOUS BLD VENIPUNCTURE: CPT | Performed by: INTERNAL MEDICINE

## 2018-02-28 PROCEDURE — 85025 COMPLETE CBC W/AUTO DIFF WBC: CPT | Performed by: INTERNAL MEDICINE

## 2018-02-28 PROCEDURE — 80053 COMPREHEN METABOLIC PANEL: CPT | Performed by: INTERNAL MEDICINE

## 2018-05-31 ENCOUNTER — HOSPITAL ENCOUNTER (OUTPATIENT)
Dept: LAB | Age: 58
Discharge: HOME | End: 2018-05-31
Payer: COMMERCIAL

## 2018-05-31 DIAGNOSIS — C92.10 CML (CHRONIC MYELOID LEUKEMIA) (HCC): ICD-10-CM

## 2018-05-31 LAB
ALBUMIN SERPL-MCNC: 3.7 G/DL (ref 3.5–5)
ALBUMIN/GLOB SERPL: 1.1 {RATIO} (ref 1.2–3.5)
ALP SERPL-CCNC: 66 U/L (ref 50–136)
ALT SERPL-CCNC: 25 U/L (ref 12–65)
ANION GAP SERPL CALC-SCNC: 8 MMOL/L (ref 7–16)
AST SERPL-CCNC: 18 U/L (ref 15–37)
BASOPHILS # BLD: 0.1 K/UL (ref 0–0.2)
BASOPHILS NFR BLD: 1 % (ref 0–2)
BILIRUB SERPL-MCNC: 0.5 MG/DL (ref 0.2–1.1)
BUN SERPL-MCNC: 10 MG/DL (ref 6–23)
CALCIUM SERPL-MCNC: 8.8 MG/DL (ref 8.3–10.4)
CHLORIDE SERPL-SCNC: 106 MMOL/L (ref 98–107)
CO2 SERPL-SCNC: 28 MMOL/L (ref 21–32)
CREAT SERPL-MCNC: 0.72 MG/DL (ref 0.6–1)
DIFFERENTIAL METHOD BLD: ABNORMAL
EOSINOPHIL # BLD: 0.2 K/UL (ref 0–0.8)
EOSINOPHIL NFR BLD: 2 % (ref 0.5–7.8)
ERYTHROCYTE [DISTWIDTH] IN BLOOD BY AUTOMATED COUNT: 14.5 % (ref 11.9–14.6)
GLOBULIN SER CALC-MCNC: 3.4 G/DL (ref 2.3–3.5)
GLUCOSE SERPL-MCNC: 140 MG/DL (ref 65–100)
HCT VFR BLD AUTO: 33.1 % (ref 35.8–46.3)
HGB BLD-MCNC: 10.4 G/DL (ref 11.7–15.4)
LYMPHOCYTES # BLD: 2.2 K/UL (ref 0.5–4.6)
LYMPHOCYTES NFR BLD: 29 % (ref 13–44)
Lab: NORMAL
MCH RBC QN AUTO: 22.2 PG (ref 26.1–32.9)
MCHC RBC AUTO-ENTMCNC: 31.4 G/DL (ref 31.4–35)
MCV RBC AUTO: 70.7 FL (ref 79.6–97.8)
MONOCYTES # BLD: 0.8 K/UL (ref 0.1–1.3)
MONOCYTES NFR BLD: 10 % (ref 4–12)
NEUTS SEG # BLD: 4.4 K/UL (ref 1.7–8.2)
NEUTS SEG NFR BLD: 58 % (ref 43–78)
NRBC # BLD: 0 K/UL (ref 0–0.2)
PLATELET # BLD AUTO: 289 K/UL (ref 150–450)
PMV BLD AUTO: 9.9 FL (ref 10.8–14.1)
POTASSIUM SERPL-SCNC: 3.8 MMOL/L (ref 3.5–5.1)
PROT SERPL-MCNC: 7.1 G/DL (ref 6.3–8.2)
RBC # BLD AUTO: 4.68 M/UL (ref 4.05–5.25)
REFERENCE LAB,REFLB: NORMAL
SODIUM SERPL-SCNC: 142 MMOL/L (ref 136–145)
TEST DESCRIPTION:,ATST: NORMAL
WBC # BLD AUTO: 7.6 K/UL (ref 4.3–11.1)

## 2018-05-31 PROCEDURE — 80053 COMPREHEN METABOLIC PANEL: CPT | Performed by: INTERNAL MEDICINE

## 2018-05-31 PROCEDURE — 85025 COMPLETE CBC W/AUTO DIFF WBC: CPT | Performed by: INTERNAL MEDICINE

## 2018-05-31 PROCEDURE — 36415 COLL VENOUS BLD VENIPUNCTURE: CPT | Performed by: INTERNAL MEDICINE

## 2018-07-27 PROBLEM — E66.01 SEVERE OBESITY (BMI 35.0-39.9): Status: ACTIVE | Noted: 2018-07-27

## 2018-07-27 PROBLEM — H40.89 OTHER SPECIFIED GLAUCOMA: Status: ACTIVE | Noted: 2018-07-27

## 2018-07-27 PROBLEM — E55.9 VITAMIN D DEFICIENCY: Status: ACTIVE | Noted: 2018-07-27

## 2018-07-30 PROBLEM — E11.9 TYPE 2 DIABETES MELLITUS WITHOUT COMPLICATION, WITHOUT LONG-TERM CURRENT USE OF INSULIN (HCC): Status: ACTIVE | Noted: 2018-07-30

## 2018-08-21 ENCOUNTER — HOSPITAL ENCOUNTER (OUTPATIENT)
Dept: MAMMOGRAPHY | Age: 58
Discharge: HOME | End: 2018-08-21
Attending: NURSE PRACTITIONER
Payer: COMMERCIAL

## 2018-08-21 DIAGNOSIS — Z12.39 SCREENING FOR MALIGNANT NEOPLASM OF BREAST: ICD-10-CM

## 2018-08-21 PROCEDURE — 77067 SCR MAMMO BI INCL CAD: CPT

## 2018-09-04 ENCOUNTER — HOSPITAL ENCOUNTER (OUTPATIENT)
Dept: LAB | Age: 58
Discharge: HOME | End: 2018-09-04
Payer: COMMERCIAL

## 2018-09-04 DIAGNOSIS — C92.10 CML (CHRONIC MYELOID LEUKEMIA) (HCC): ICD-10-CM

## 2018-09-04 DIAGNOSIS — D64.9 ANEMIA, UNSPECIFIED TYPE: ICD-10-CM

## 2018-09-04 LAB
ALBUMIN SERPL-MCNC: 3.6 G/DL (ref 3.5–5)
ALBUMIN/GLOB SERPL: 1.1 {RATIO} (ref 1.2–3.5)
ALP SERPL-CCNC: 96 U/L (ref 50–136)
ALT SERPL-CCNC: 27 U/L (ref 12–65)
ANION GAP SERPL CALC-SCNC: 8 MMOL/L (ref 7–16)
AST SERPL-CCNC: 18 U/L (ref 15–37)
BASOPHILS # BLD: 0.1 K/UL (ref 0–0.2)
BASOPHILS NFR BLD: 1 % (ref 0–2)
BILIRUB SERPL-MCNC: 0.3 MG/DL (ref 0.2–1.1)
BUN SERPL-MCNC: 9 MG/DL (ref 6–23)
CALCIUM SERPL-MCNC: 8.5 MG/DL (ref 8.3–10.4)
CHLORIDE SERPL-SCNC: 107 MMOL/L (ref 98–107)
CO2 SERPL-SCNC: 26 MMOL/L (ref 21–32)
CREAT SERPL-MCNC: 0.81 MG/DL (ref 0.6–1)
DIFFERENTIAL METHOD BLD: ABNORMAL
EOSINOPHIL # BLD: 0.2 K/UL (ref 0–0.8)
EOSINOPHIL NFR BLD: 3 % (ref 0.5–7.8)
ERYTHROCYTE [DISTWIDTH] IN BLOOD BY AUTOMATED COUNT: 15.9 % (ref 11.9–14.6)
GLOBULIN SER CALC-MCNC: 3.4 G/DL (ref 2.3–3.5)
GLUCOSE SERPL-MCNC: 158 MG/DL (ref 65–100)
HCT VFR BLD AUTO: 32.3 % (ref 35.8–46.3)
HGB BLD-MCNC: 9.7 G/DL (ref 11.7–15.4)
IMM GRANULOCYTES # BLD: 0 K/UL (ref 0–0.5)
IMM GRANULOCYTES NFR BLD AUTO: 0 % (ref 0–5)
LYMPHOCYTES # BLD: 2.3 K/UL (ref 0.5–4.6)
LYMPHOCYTES NFR BLD: 32 % (ref 13–44)
Lab: NORMAL
MCH RBC QN AUTO: 21.6 PG (ref 26.1–32.9)
MCHC RBC AUTO-ENTMCNC: 30 G/DL (ref 31.4–35)
MCV RBC AUTO: 71.9 FL (ref 79.6–97.8)
MONOCYTES # BLD: 0.7 K/UL (ref 0.1–1.3)
MONOCYTES NFR BLD: 10 % (ref 4–12)
NEUTS SEG # BLD: 3.9 K/UL (ref 1.7–8.2)
NEUTS SEG NFR BLD: 55 % (ref 43–78)
NRBC # BLD: 0 K/UL (ref 0–0.2)
PLATELET # BLD AUTO: 249 K/UL (ref 150–450)
PMV BLD AUTO: 10.4 FL (ref 9.4–12.3)
POTASSIUM SERPL-SCNC: 4 MMOL/L (ref 3.5–5.1)
PROT SERPL-MCNC: 7 G/DL (ref 6.3–8.2)
RBC # BLD AUTO: 4.49 M/UL (ref 4.05–5.25)
REFERENCE LAB,REFLB: NORMAL
SODIUM SERPL-SCNC: 141 MMOL/L (ref 136–145)
TEST DESCRIPTION:,ATST: NORMAL
WBC # BLD AUTO: 7.2 K/UL (ref 4.3–11.1)

## 2018-09-04 PROCEDURE — 82728 ASSAY OF FERRITIN: CPT

## 2018-09-04 PROCEDURE — 85025 COMPLETE CBC W/AUTO DIFF WBC: CPT

## 2018-09-04 PROCEDURE — 80053 COMPREHEN METABOLIC PANEL: CPT

## 2018-09-04 PROCEDURE — 36415 COLL VENOUS BLD VENIPUNCTURE: CPT

## 2018-09-04 PROCEDURE — 83540 ASSAY OF IRON: CPT

## 2018-09-06 LAB
FERRITIN SERPL-MCNC: 229 NG/ML (ref 8–388)
IRON SATN MFR SERPL: 27 %
IRON SERPL-MCNC: 70 UG/DL (ref 35–150)
TIBC SERPL-MCNC: 259 UG/DL (ref 250–450)

## 2018-12-10 ENCOUNTER — HOSPITAL ENCOUNTER (OUTPATIENT)
Dept: LAB | Age: 58
Discharge: HOME | End: 2018-12-10
Payer: COMMERCIAL

## 2018-12-10 DIAGNOSIS — C92.10 CML (CHRONIC MYELOID LEUKEMIA) (HCC): ICD-10-CM

## 2018-12-10 LAB
ALBUMIN SERPL-MCNC: 4.1 G/DL (ref 3.5–5)
ALBUMIN/GLOB SERPL: 1.2 {RATIO} (ref 1.2–3.5)
ALP SERPL-CCNC: 73 U/L (ref 50–136)
ALT SERPL-CCNC: 22 U/L (ref 12–65)
ANION GAP SERPL CALC-SCNC: 6 MMOL/L (ref 7–16)
AST SERPL-CCNC: 18 U/L (ref 15–37)
BASOPHILS # BLD: 0.1 K/UL (ref 0–0.2)
BASOPHILS NFR BLD: 1 % (ref 0–2)
BILIRUB SERPL-MCNC: 0.5 MG/DL (ref 0.2–1.1)
BUN SERPL-MCNC: 16 MG/DL (ref 6–23)
CALCIUM SERPL-MCNC: 9 MG/DL (ref 8.3–10.4)
CHLORIDE SERPL-SCNC: 105 MMOL/L (ref 98–107)
CO2 SERPL-SCNC: 28 MMOL/L (ref 21–32)
CREAT SERPL-MCNC: 0.81 MG/DL (ref 0.6–1)
DIFFERENTIAL METHOD BLD: ABNORMAL
EOSINOPHIL # BLD: 0.1 K/UL (ref 0–0.8)
EOSINOPHIL NFR BLD: 1 % (ref 0.5–7.8)
ERYTHROCYTE [DISTWIDTH] IN BLOOD BY AUTOMATED COUNT: 14.6 % (ref 11.9–14.6)
GLOBULIN SER CALC-MCNC: 3.5 G/DL (ref 2.3–3.5)
GLUCOSE SERPL-MCNC: 125 MG/DL (ref 65–100)
HCT VFR BLD AUTO: 32.6 % (ref 35.8–46.3)
HGB BLD-MCNC: 10.1 G/DL (ref 11.7–15.4)
IMM GRANULOCYTES # BLD: 0.3 K/UL (ref 0–0.5)
IMM GRANULOCYTES NFR BLD AUTO: 2 % (ref 0–5)
LYMPHOCYTES # BLD: 3.1 K/UL (ref 0.5–4.6)
LYMPHOCYTES NFR BLD: 24 % (ref 13–44)
Lab: NORMAL
MCH RBC QN AUTO: 22.1 PG (ref 26.1–32.9)
MCHC RBC AUTO-ENTMCNC: 31 G/DL (ref 31.4–35)
MCV RBC AUTO: 71.5 FL (ref 79.6–97.8)
MONOCYTES # BLD: 0.9 K/UL (ref 0.1–1.3)
MONOCYTES NFR BLD: 7 % (ref 4–12)
NEUTS SEG # BLD: 8.8 K/UL (ref 1.7–8.2)
NEUTS SEG NFR BLD: 67 % (ref 43–78)
NRBC # BLD: 0.02 K/UL (ref 0–0.2)
PLATELET # BLD AUTO: 322 K/UL (ref 150–450)
PMV BLD AUTO: 10.5 FL (ref 9.4–12.3)
POTASSIUM SERPL-SCNC: 3.8 MMOL/L (ref 3.5–5.1)
PROT SERPL-MCNC: 7.6 G/DL (ref 6.3–8.2)
RBC # BLD AUTO: 4.56 M/UL (ref 4.05–5.25)
REFERENCE LAB,REFLB: NORMAL
SODIUM SERPL-SCNC: 139 MMOL/L (ref 136–145)
TEST DESCRIPTION:,ATST: NORMAL
WBC # BLD AUTO: 13.2 K/UL (ref 4.3–11.1)

## 2018-12-10 PROCEDURE — 36415 COLL VENOUS BLD VENIPUNCTURE: CPT

## 2018-12-10 PROCEDURE — 85025 COMPLETE CBC W/AUTO DIFF WBC: CPT

## 2018-12-10 PROCEDURE — 80053 COMPREHEN METABOLIC PANEL: CPT

## 2018-12-12 ENCOUNTER — HOSPITAL ENCOUNTER (OUTPATIENT)
Dept: MRI IMAGING | Age: 58
Discharge: HOME | End: 2018-12-12
Attending: NURSE PRACTITIONER
Payer: COMMERCIAL

## 2018-12-12 DIAGNOSIS — M48.061 LUMBAR STENOSIS WITHOUT NEUROGENIC CLAUDICATION: ICD-10-CM

## 2018-12-12 DIAGNOSIS — G89.29 CHRONIC BILATERAL LOW BACK PAIN WITH BILATERAL SCIATICA: ICD-10-CM

## 2018-12-12 DIAGNOSIS — M54.41 CHRONIC BILATERAL LOW BACK PAIN WITH BILATERAL SCIATICA: ICD-10-CM

## 2018-12-12 DIAGNOSIS — M54.42 CHRONIC BILATERAL LOW BACK PAIN WITH BILATERAL SCIATICA: ICD-10-CM

## 2018-12-12 PROCEDURE — 72148 MRI LUMBAR SPINE W/O DYE: CPT

## 2018-12-15 ENCOUNTER — HOSPITAL ENCOUNTER (EMERGENCY)
Age: 58
Discharge: HOME | End: 2018-12-15
Attending: EMERGENCY MEDICINE
Payer: COMMERCIAL

## 2018-12-15 VITALS
OXYGEN SATURATION: 97 % | TEMPERATURE: 98.6 F | SYSTOLIC BLOOD PRESSURE: 138 MMHG | BODY MASS INDEX: 34.49 KG/M2 | DIASTOLIC BLOOD PRESSURE: 79 MMHG | RESPIRATION RATE: 18 BRPM | WEIGHT: 207 LBS | HEIGHT: 65 IN | HEART RATE: 101 BPM

## 2018-12-15 DIAGNOSIS — M51.26 LUMBAR HERNIATED DISC: Primary | ICD-10-CM

## 2018-12-15 PROCEDURE — 74011250637 HC RX REV CODE- 250/637: Performed by: EMERGENCY MEDICINE

## 2018-12-15 PROCEDURE — 96372 THER/PROPH/DIAG INJ SC/IM: CPT | Performed by: EMERGENCY MEDICINE

## 2018-12-15 PROCEDURE — 99283 EMERGENCY DEPT VISIT LOW MDM: CPT | Performed by: EMERGENCY MEDICINE

## 2018-12-15 PROCEDURE — 74011250636 HC RX REV CODE- 250/636: Performed by: EMERGENCY MEDICINE

## 2018-12-15 RX ORDER — HYDROMORPHONE HYDROCHLORIDE 1 MG/ML
1 INJECTION, SOLUTION INTRAMUSCULAR; INTRAVENOUS; SUBCUTANEOUS
Status: COMPLETED | OUTPATIENT
Start: 2018-12-15 | End: 2018-12-15

## 2018-12-15 RX ORDER — DEXAMETHASONE SODIUM PHOSPHATE 100 MG/10ML
10 INJECTION INTRAMUSCULAR; INTRAVENOUS
Status: COMPLETED | OUTPATIENT
Start: 2018-12-15 | End: 2018-12-15

## 2018-12-15 RX ORDER — ONDANSETRON 4 MG/1
4 TABLET, ORALLY DISINTEGRATING ORAL
Qty: 12 TAB | Refills: 0 | Status: SHIPPED | OUTPATIENT
Start: 2018-12-15 | End: 2019-03-14 | Stop reason: ALTCHOICE

## 2018-12-15 RX ORDER — OXYCODONE HYDROCHLORIDE 5 MG/1
5 TABLET ORAL
Qty: 12 TAB | Refills: 0 | Status: SHIPPED | OUTPATIENT
Start: 2018-12-15 | End: 2019-01-23 | Stop reason: ALTCHOICE

## 2018-12-15 RX ADMIN — DEXAMETHASONE SODIUM PHOSPHATE 10 MG: 10 INJECTION INTRAMUSCULAR; INTRAVENOUS at 12:29

## 2018-12-15 RX ADMIN — HYDROMORPHONE HYDROCHLORIDE 1 MG: 1 INJECTION, SOLUTION INTRAMUSCULAR; INTRAVENOUS; SUBCUTANEOUS at 12:30

## 2018-12-15 NOTE — DISCHARGE INSTRUCTIONS
Herniated Disc: Care Instructions  Your Care Instructions    The bones that form the spine in your back are cushioned by small discs. If a disc is damaged, it may bulge or break open (herniate). A herniated disc can result from normal wear and tear as we age or from an injury or disease. If a herniated disc presses on a nerve, it can cause pain and numbness in your leg (sciatica) and/or back pain. You may be able to heal your herniated disc with a few weeks or months of rest, medicine, and exercises. In some cases, you may need surgery. Follow-up care is a key part of your treatment and safety. Be sure to make and go to all appointments, and call your doctor if you are having problems. It's also a good idea to know your test results and keep a list of the medicines you take. How can you care for yourself at home? · Take your medicines exactly as prescribed. Call your doctor if you think you are having a problem with your medicine. · Ask your doctor if you can take an over-the-counter pain medicine, such as acetaminophen (Tylenol), ibuprofen (Advil, Motrin), or naproxen (Aleve). Read and follow all instructions on the label. · Do not take two or more pain medicines at the same time unless the doctor told you to. Many pain medicines have acetaminophen, which is Tylenol. Too much acetaminophen (Tylenol) can be harmful. · Rest your back if your pain is severe. · Avoid movements and positions that increase your pain or numbness. · Try taking short walks and doing light activities that do not cause pain. Even if you are feeling some pain, it is important to keep your muscles active and strong. · Use heat or ice to relieve pain. ? To apply heat, put a warm water bottle, heating pad set on low, or warm cloth on your back. Do not go to sleep with a heating pad on your skin. ? To use ice, put ice or a cold pack on the area for 10 to 20 minutes at a time. Put a thin cloth between the ice and your skin.   · Your doctor may recommend a physical therapy program, where you learn exercises to do at home. These exercises strengthen the muscles that support your lower back and prevent reinjury. · Stay at a healthy weight. This may reduce the load on your back. · Quit smoking if you smoke. If you need help quitting, talk to your doctor about stop-smoking programs and medicines. These can increase your chances of quitting for good. · To avoid hurting your back when lifting:  ? Lift with your legs, not your back, by squatting and bending your knees. Avoid bending forward at the waist when lifting. ? Rise slowly. ? Keep the load as close to your body as possible, at the level of your navel. ? Avoid turning or twisting your body while holding a heavy object. ? Get help if you need to lift a heavy object. Never lift a heavy object above shoulder level. When should you call for help? Call 911 anytime you think you may need emergency care. For example, call if:    · You are unable to move a leg at all.   [facility] your doctor now or seek immediate medical care if:    · You have new or worse symptoms in your arms, legs, chest, belly, or buttocks. Symptoms may include:  ? Numbness or tingling. ? Weakness. ? Pain.     · You lose bladder or bowel control.    Watch closely for changes in your health, and be sure to contact your doctor if:    · You are not getting better as expected. Where can you learn more? Go to http://gibson-tory.info/. Enter F534 in the search box to learn more about \"Herniated Disc: Care Instructions. \"  Current as of: November 29, 2017  Content Version: 11.8  © 1723-9713 [employer]. Care instructions adapted under license by [employer] (which disclaims liability or warranty for this information).  If you have questions about a medical condition or this instruction, always ask your healthcare professional. Bruce [employer] disclaims any warranty or liability for your use of this information.

## 2018-12-15 NOTE — ED TRIAGE NOTES
Pt had a MRI Wednesday night that showed she has a herniated disc. 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 Sat down on the toilet this morning and was unable to get up due to the pain. Son had to assist her back to the bed. Took 3 excedrin tension this morning and put menthol gel on back to help with pain. Pain is horrible except when she lays flat on back. Took 2 tramadol. Is vomiting in triage due to pain.

## 2018-12-15 NOTE — ED PROVIDER NOTES
Patient is a 58-year-old female who is coming in with severe lower back pain. She states that today she tried to get up from the toilet and pain medicine that she was having difficulty walking. She states that the pain subsided her leg strength seems fine. She has had no urinary incontinence or bowel incontinence. She has history of recurrent severe lumbar pain for several months now and has required multiple Medrol Dosepaks. She states she has known spinal stenosis and herniated disc she is seeing neurosurgery for this and elected to hold off on surgery. Over the last week her symptoms have worsened. She did have a MRI done several days ago which shows severe herniation and nerve impingement.                Past Medical History:   Diagnosis Date    Anemia 2/21/2017    Arthritis     Beta thalassemia (HCC)     Beta+ thalassemia (HCC)     Chronic pain     neck left arm and shoulder    CML (chronic myeloid leukemia) ([facility] [address] 3/8/2016    patient states she is stable at this time    DDD (degenerative disc disease), lumbar     herniated disc L2-3, L5-S1 (leaking)-treatment w/epidural injection    Diverticulosis of colon (without mention of hemorrhage) 2015    Hypothyroid     levothyroxine for hypo    Insomnia     Internal hemorrhoids without mention of complication 5398    Migraine headache     PUD (peptic ulcer disease) 2002ish    GI bleed s/p use of celebrex; no other issues    Rectocele 2015    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    Spinal stenosis, cervical region        Past Surgical History:   Procedure Laterality Date    HX APPENDECTOMY  2007    HX CERVICAL FUSION  2004, March 2017    C4-5,C5-6 x 2    HX COLONOSCOPY  last 1/19/15    Sunitha--no polyps--7-10 year recall    HX CYST INCISION AND DRAINAGE      HX CYSTOCELE REPAIR  1991    HX HYSTERECTOMY  1991    with cystocele and rectocele    HX KNEE REPLACEMENT Right 2011    HX RECTOCELE REPAIR  1991    HX TUBAL LIGATION 1986    HX WISDOM TEETH EXTRACTION  1979    HI ANESTH,SURGERY OF SHOULDER Left 1993         Family History:   Problem Relation Age of Onset    Hypertension Mother     Thyroid Disease Mother     Arthritis-osteo Mother     Heart Disease Father     Hypertension Father     Diabetes Father     Stroke Father     Dementia Father     Parkinson's Disease Father     Breast Cancer Maternal Aunt        Social History     Socioeconomic History    Marital status: LEGALLY      Spouse name: Not on file    Number of children: Not on file    Years of education: Not on file    Highest education level: Not on file   Social Needs    Financial resource strain: Not on file    Food insecurity - worry: Not on file    Food insecurity - inability: Not on file   Happy Inspector needs - medical: Not on file   Happy Inspector needs - non-medical: Not on file   Occupational History    Not on file   Tobacco Use    Smoking status: Never Smoker    Smokeless tobacco: Never Used   Substance and Sexual Activity    Alcohol use: No    Drug use: No    Sexual activity: Not Currently     Birth control/protection: Surgical     Comment: Hysterectomy   Other Topics Concern     Service Not Asked    Blood Transfusions Not Asked    Caffeine Concern Not Asked    Occupational Exposure Not Asked    Hobby Hazards Not Asked    Sleep Concern Not Asked    Stress Concern Not Asked    Weight Concern Not Asked    Special Diet Not Asked    Back Care Not Asked    Exercise Not Asked    Bike Helmet Not Asked    Seat Belt Yes    Self-Exams Yes   Social History Narrative    Denies physical or sexual abuse         ALLERGIES: Latex, natural rubber; Latex; Hydrocodone-acetaminophen; Meperidine; and Other medication    Review of Systems   Constitutional: Negative for chills and fever. Respiratory: Negative for chest tightness, shortness of breath, wheezing and stridor.     Cardiovascular: Negative for chest pain and palpitations. Gastrointestinal: Negative for abdominal pain, diarrhea, nausea and vomiting. Musculoskeletal: Negative for neck pain and neck stiffness. Skin: Negative. All other systems reviewed and are negative. Vitals:    12/15/18 1101 12/15/18 1220 12/15/18 1224   BP: (!) 158/99 149/85    Pulse: (!) 101     Resp: 18     Temp: 98.6 °F (37 °C)     SpO2: 98% 99% 99%   Weight: 93.9 kg (207 lb)     Height: 5' 5\" (1.651 m)              Physical Exam   Constitutional: She is oriented to person, place, and time. She appears well-developed and well-nourished. She appears distressed (appears uncomfortable due to pain). HENT:   Head: Normocephalic and atraumatic. Right Ear: External ear normal.   Left Ear: External ear normal.   Mouth/Throat: No oropharyngeal exudate. Eyes: Conjunctivae and EOM are normal. Pupils are equal, round, and reactive to light. No scleral icterus. Neck: Normal range of motion. Pulmonary/Chest: Effort normal and breath sounds normal. No respiratory distress. Abdominal: Soft. Bowel sounds are normal. She exhibits no distension and no mass. There is no tenderness. There is no guarding. Musculoskeletal:   Lower back pain diffusely with palpitation. Neurological: She is alert and oriented to person, place, and time. Skin: Skin is warm and dry. She is not diaphoretic. Psychiatric: She has a normal mood and affect. Her behavior is normal.   Nursing note and vitals reviewed. MDM  Number of Diagnoses or Management Options  Lumbar herniated disc:   Diagnosis management comments: Patient is feeling better on reevaluation. Her MRI was reviewed and shows severe disease advised that she really needs to follow up with Dr. Tiffanie Wolfe she likely will need to get the surgery done. She states that her PCP. A referral yesterday for an appointment with him and she was given symptomatic treatment.   She has responded well to Decadron in the past.  She is going home with family leg strength is currently intact she was given return precautions. Shashank Chirinos MD; 12/15/2018 @2:02 PM Voice dictation software was used during the making of this note. This software is not perfect and grammatical and other typographical errors may be present.   This note has not been proofread for errors.  ===================================================================        Amount and/or Complexity of Data Reviewed  Tests in the radiology section of CPT®: reviewed           Procedures

## 2018-12-15 NOTE — ED NOTES
I have reviewed discharge instructions with the patient. The patient verbalized understanding. Patient left ED via Discharge Method: wheelchair to Home with self. Opportunity for questions and clarification provided. Patient given 2 scripts. To continue your aftercare when you leave the hospital, you may receive an automated call from our care team to check in on how you are doing. This is a free service and part of our promise to provide the best care and service to meet your aftercare needs.  If you have questions, or wish to unsubscribe from this service please call [phone]. Thank you for Choosing our [facility] Emergency Department.

## 2018-12-20 PROBLEM — M51.36 DDD (DEGENERATIVE DISC DISEASE), LUMBAR: Status: ACTIVE | Noted: 2018-12-20

## 2018-12-20 PROBLEM — M51.26 HNP (HERNIATED NUCLEUS PULPOSUS), LUMBAR: Status: ACTIVE | Noted: 2018-12-20

## 2018-12-20 PROBLEM — M48.062 LUMBAR STENOSIS WITH NEUROGENIC CLAUDICATION: Status: ACTIVE | Noted: 2018-02-01

## 2019-01-07 ENCOUNTER — HOSPITAL ENCOUNTER (OUTPATIENT)
Dept: SURGERY | Age: 59
Discharge: HOME | End: 2019-01-07
Payer: COMMERCIAL

## 2019-01-07 VITALS
DIASTOLIC BLOOD PRESSURE: 83 MMHG | WEIGHT: 210.8 LBS | RESPIRATION RATE: 17 BRPM | OXYGEN SATURATION: 98 % | SYSTOLIC BLOOD PRESSURE: 139 MMHG | TEMPERATURE: 98.2 F | BODY MASS INDEX: 35.99 KG/M2 | HEIGHT: 64 IN

## 2019-01-07 LAB
BACTERIA SPEC CULT: NORMAL
HGB BLD-MCNC: 9.5 G/DL (ref 11.7–15.4)
SERVICE CMNT-IMP: NORMAL

## 2019-01-07 PROCEDURE — 87641 MR-STAPH DNA AMP PROBE: CPT

## 2019-01-07 PROCEDURE — 85018 HEMOGLOBIN: CPT

## 2019-01-07 RX ORDER — BRIMONIDINE TARTRATE, TIMOLOL MALEATE 2; 5 MG/ML; MG/ML
1 SOLUTION/ DROPS OPHTHALMIC EVERY 12 HOURS
COMMUNITY
End: 2019-09-24 | Stop reason: SDUPTHER

## 2019-01-07 RX ORDER — LATANOPROST 50 UG/ML
1 SOLUTION/ DROPS OPHTHALMIC
COMMUNITY

## 2019-01-07 RX ORDER — ACETAMINOPHEN/DIPHENHYDRAMINE 500MG-25MG
1 TABLET ORAL
COMMUNITY
End: 2020-02-21 | Stop reason: ALTCHOICE

## 2019-01-07 NOTE — PERIOP NOTES
Patient verified name & . Order to obtain consent found in EHR and matches case posting. TYPE  CASE:1B Orders:  received Labs per Spine protocol:  MRSA/MSSA Labs per anesthesia protocol: Hgb EKG/cardiac records  :  Not required Glucose: not required Medication bottles medication list visualized today. Instructed patient to continue previous medications as prescribed prior to surgery and  to take the following medications the day of surgery according to anesthesia guidelines with a small sip of water : Imatinib, Levothyroxine, Zofran if needed, Oxycodone if needed, Tramadol if needed, Valtrex if needed. Continue all previous medications unless otherwise directed. Instructed patient to hold all vitamins 7 days prior to surgery and the following medications prior to surgery: Pt was provided with antibacterial soap, Hibiclens, long-handled sponge. Handouts and all Surgery instructions provided to pt and pt verbalizes understanding. Patient Guide to Surgery Packet provided to patient. Packet includes Patient Guide to surgery handout, Facts about Pain Management handout, Facts about Urinary Catherization handout, Hand Hygiene handout, Patient Education and Teaching Sheet -Transfusion of Blood and Blood Products handout, and  [facility] frequent question and answer sheet. Guide reviewed with the patient and all questions answered to the satisfaction of the patient. Pt advised to visit [email or web address] for more educational information regarding anesthesia and to record any additional questions that arise so that it can be addressed by the anesthesiologist on the morning of surgery. Patient instructed on the following and verbalized understanding: 
Arrive at Main entrance, time of arrival to be called the day before by 1700. Responsible adult must drive patient to and from hospital, stay during surgery and 24 hours postoperatively. Npo after midnight including gum, mints and ice chips. Shower using hibiclens the night before and the morning of surgery. Hibiclens provided to the patient with handout and verbal instructions for use. Leave all valuables at home. Instructed on no jewelry or body piercings on the dos. Bring insurance card and ID. No perfumes, oil, powder, colognes, makeup or  lotions on the skin. Patient verbalized understanding of all instructions and provided all medical/health information to the best of their ability.

## 2019-01-07 NOTE — PERIOP NOTES
Recent Results (from the past 12 hour(s)) MSSA/MRSA SC BY PCR, NASAL SWAB Collection Time: 01/07/19  9:51 AM  
Result Value Ref Range Special Requests: NO SPECIAL REQUESTS Culture result:     
  SA target not detected. A MRSA NEGATIVE, SA NEGATIVE test result does not preclude MRSA or SA nasal colonization. HEMOGLOBIN Collection Time: 01/07/19  9:54 AM  
Result Value Ref Range HGB 9.5 (L) 11.7 - 15.4 g/dL Pt hx of Beta Thalassemia. Results routed to Dr Billy Fried.

## 2019-01-08 ENCOUNTER — ANESTHESIA EVENT (OUTPATIENT)
Dept: SURGERY | Age: 59
End: 2019-01-08
Payer: COMMERCIAL

## 2019-01-09 ENCOUNTER — ANESTHESIA (OUTPATIENT)
Dept: SURGERY | Age: 59
End: 2019-01-09
Payer: COMMERCIAL

## 2019-01-09 ENCOUNTER — APPOINTMENT (OUTPATIENT)
Dept: GENERAL RADIOLOGY | Age: 59
End: 2019-01-09
Attending: NEUROLOGICAL SURGERY
Payer: COMMERCIAL

## 2019-01-09 ENCOUNTER — HOSPITAL ENCOUNTER (OUTPATIENT)
Age: 59
Setting detail: OUTPATIENT SURGERY
Discharge: HOME | End: 2019-01-09
Attending: NEUROLOGICAL SURGERY | Admitting: NEUROLOGICAL SURGERY
Payer: COMMERCIAL

## 2019-01-09 VITALS
HEIGHT: 64 IN | RESPIRATION RATE: 18 BRPM | DIASTOLIC BLOOD PRESSURE: 77 MMHG | HEART RATE: 68 BPM | WEIGHT: 209 LBS | BODY MASS INDEX: 35.68 KG/M2 | OXYGEN SATURATION: 96 % | SYSTOLIC BLOOD PRESSURE: 150 MMHG | TEMPERATURE: 97.9 F

## 2019-01-09 DIAGNOSIS — M51.26 HNP (HERNIATED NUCLEUS PULPOSUS), LUMBAR: Primary | ICD-10-CM

## 2019-01-09 DIAGNOSIS — M48.062 LUMBAR STENOSIS WITH NEUROGENIC CLAUDICATION: ICD-10-CM

## 2019-01-09 PROCEDURE — 77030019908 HC STETH ESOPH SIMS -A: Performed by: ANESTHESIOLOGY

## 2019-01-09 PROCEDURE — 77030020782 HC GWN BAIR PAWS FLX 3M -B: Performed by: ANESTHESIOLOGY

## 2019-01-09 PROCEDURE — 77030012935 HC DRSG AQUACEL BMS -B: Performed by: NEUROLOGICAL SURGERY

## 2019-01-09 PROCEDURE — 72020 X-RAY EXAM OF SPINE 1 VIEW: CPT

## 2019-01-09 PROCEDURE — 74011250636 HC RX REV CODE- 250/636: Performed by: ANESTHESIOLOGY

## 2019-01-09 PROCEDURE — 74011250637 HC RX REV CODE- 250/637: Performed by: NEUROLOGICAL SURGERY

## 2019-01-09 PROCEDURE — 76210000026 HC REC RM PH II 1 TO 1.5 HR: Performed by: NEUROLOGICAL SURGERY

## 2019-01-09 PROCEDURE — 76010000161 HC OR TIME 1 TO 1.5 HR INTENSV-TIER 1: Performed by: NEUROLOGICAL SURGERY

## 2019-01-09 PROCEDURE — 74011250636 HC RX REV CODE- 250/636: Performed by: NEUROLOGICAL SURGERY

## 2019-01-09 PROCEDURE — 88304 TISSUE EXAM BY PATHOLOGIST: CPT

## 2019-01-09 PROCEDURE — 76060000033 HC ANESTHESIA 1 TO 1.5 HR: Performed by: NEUROLOGICAL SURGERY

## 2019-01-09 PROCEDURE — 74011000250 HC RX REV CODE- 250: Performed by: NEUROLOGICAL SURGERY

## 2019-01-09 PROCEDURE — 77030012894: Performed by: NEUROLOGICAL SURGERY

## 2019-01-09 PROCEDURE — 77030039267 HC ADH SKN EXOFIN S2SG -B: Performed by: NEUROLOGICAL SURGERY

## 2019-01-09 PROCEDURE — 74011250636 HC RX REV CODE- 250/636

## 2019-01-09 PROCEDURE — 77030018390 HC SPNG HEMSTAT2 J&J -B: Performed by: NEUROLOGICAL SURGERY

## 2019-01-09 PROCEDURE — 77030030163 HC BN WAX J&J -A: Performed by: NEUROLOGICAL SURGERY

## 2019-01-09 PROCEDURE — 77030032490 HC SLV COMPR SCD KNE COVD -B: Performed by: NEUROLOGICAL SURGERY

## 2019-01-09 PROCEDURE — 77030039425 HC BLD LARYNG TRULITE DISP TELE -A: Performed by: ANESTHESIOLOGY

## 2019-01-09 PROCEDURE — 77030037088 HC TUBE ENDOTRACH ORAL NSL COVD-A: Performed by: ANESTHESIOLOGY

## 2019-01-09 PROCEDURE — 74011250637 HC RX REV CODE- 250/637: Performed by: ANESTHESIOLOGY

## 2019-01-09 PROCEDURE — 74011000250 HC RX REV CODE- 250

## 2019-01-09 PROCEDURE — 77030003029 HC SUT VCRL J&J -B: Performed by: NEUROLOGICAL SURGERY

## 2019-01-09 PROCEDURE — 76210000006 HC OR PH I REC 0.5 TO 1 HR: Performed by: NEUROLOGICAL SURGERY

## 2019-01-09 PROCEDURE — 77030018836 HC SOL IRR NACL ICUM -A: Performed by: NEUROLOGICAL SURGERY

## 2019-01-09 RX ORDER — NALOXONE HYDROCHLORIDE 0.4 MG/ML
0.1 INJECTION, SOLUTION INTRAMUSCULAR; INTRAVENOUS; SUBCUTANEOUS
Status: DISCONTINUED | OUTPATIENT
Start: 2019-01-09 | End: 2019-01-09 | Stop reason: HOSPADM

## 2019-01-09 RX ORDER — CEFAZOLIN SODIUM/WATER 2 G/20 ML
2 SYRINGE (ML) INTRAVENOUS ONCE
Status: DISCONTINUED | OUTPATIENT
Start: 2019-01-09 | End: 2019-01-09 | Stop reason: HOSPADM

## 2019-01-09 RX ORDER — PROPOFOL 10 MG/ML
INJECTION, EMULSION INTRAVENOUS AS NEEDED
Status: DISCONTINUED | OUTPATIENT
Start: 2019-01-09 | End: 2019-01-09 | Stop reason: HOSPADM

## 2019-01-09 RX ORDER — MIDAZOLAM HYDROCHLORIDE 1 MG/ML
2 INJECTION, SOLUTION INTRAMUSCULAR; INTRAVENOUS
Status: COMPLETED | OUTPATIENT
Start: 2019-01-09 | End: 2019-01-09

## 2019-01-09 RX ORDER — FLUMAZENIL 0.1 MG/ML
0.2 INJECTION INTRAVENOUS
Status: DISCONTINUED | OUTPATIENT
Start: 2019-01-09 | End: 2019-01-09 | Stop reason: HOSPADM

## 2019-01-09 RX ORDER — HYDROMORPHONE HYDROCHLORIDE 2 MG/ML
0.5 INJECTION, SOLUTION INTRAMUSCULAR; INTRAVENOUS; SUBCUTANEOUS
Status: DISCONTINUED | OUTPATIENT
Start: 2019-01-09 | End: 2019-01-09 | Stop reason: HOSPADM

## 2019-01-09 RX ORDER — DIPHENHYDRAMINE HYDROCHLORIDE 50 MG/ML
12.5 INJECTION, SOLUTION INTRAMUSCULAR; INTRAVENOUS
Status: DISCONTINUED | OUTPATIENT
Start: 2019-01-09 | End: 2019-01-09 | Stop reason: HOSPADM

## 2019-01-09 RX ORDER — GLYCOPYRROLATE 0.2 MG/ML
INJECTION INTRAMUSCULAR; INTRAVENOUS AS NEEDED
Status: DISCONTINUED | OUTPATIENT
Start: 2019-01-09 | End: 2019-01-09 | Stop reason: HOSPADM

## 2019-01-09 RX ORDER — ONDANSETRON 2 MG/ML
INJECTION INTRAMUSCULAR; INTRAVENOUS AS NEEDED
Status: DISCONTINUED | OUTPATIENT
Start: 2019-01-09 | End: 2019-01-09 | Stop reason: HOSPADM

## 2019-01-09 RX ORDER — SODIUM CHLORIDE, SODIUM LACTATE, POTASSIUM CHLORIDE, CALCIUM CHLORIDE 600; 310; 30; 20 MG/100ML; MG/100ML; MG/100ML; MG/100ML
75 INJECTION, SOLUTION INTRAVENOUS CONTINUOUS
Status: DISCONTINUED | OUTPATIENT
Start: 2019-01-09 | End: 2019-01-09 | Stop reason: HOSPADM

## 2019-01-09 RX ORDER — NEOSTIGMINE METHYLSULFATE 1 MG/ML
INJECTION INTRAVENOUS AS NEEDED
Status: DISCONTINUED | OUTPATIENT
Start: 2019-01-09 | End: 2019-01-09 | Stop reason: HOSPADM

## 2019-01-09 RX ORDER — ROCURONIUM BROMIDE 10 MG/ML
INJECTION, SOLUTION INTRAVENOUS AS NEEDED
Status: DISCONTINUED | OUTPATIENT
Start: 2019-01-09 | End: 2019-01-09 | Stop reason: HOSPADM

## 2019-01-09 RX ORDER — SODIUM CHLORIDE, SODIUM LACTATE, POTASSIUM CHLORIDE, CALCIUM CHLORIDE 600; 310; 30; 20 MG/100ML; MG/100ML; MG/100ML; MG/100ML
100 INJECTION, SOLUTION INTRAVENOUS CONTINUOUS
Status: DISCONTINUED | OUTPATIENT
Start: 2019-01-09 | End: 2019-01-09 | Stop reason: HOSPADM

## 2019-01-09 RX ORDER — CEFAZOLIN SODIUM 1 G/3ML
INJECTION, POWDER, FOR SOLUTION INTRAMUSCULAR; INTRAVENOUS AS NEEDED
Status: DISCONTINUED | OUTPATIENT
Start: 2019-01-09 | End: 2019-01-09 | Stop reason: HOSPADM

## 2019-01-09 RX ORDER — LIDOCAINE HYDROCHLORIDE 10 MG/ML
0.1 INJECTION INFILTRATION; PERINEURAL AS NEEDED
Status: DISCONTINUED | OUTPATIENT
Start: 2019-01-09 | End: 2019-01-09 | Stop reason: HOSPADM

## 2019-01-09 RX ORDER — FENTANYL CITRATE 50 UG/ML
INJECTION, SOLUTION INTRAMUSCULAR; INTRAVENOUS AS NEEDED
Status: DISCONTINUED | OUTPATIENT
Start: 2019-01-09 | End: 2019-01-09 | Stop reason: HOSPADM

## 2019-01-09 RX ORDER — OXYCODONE HYDROCHLORIDE 5 MG/1
5 TABLET ORAL
Status: COMPLETED | OUTPATIENT
Start: 2019-01-09 | End: 2019-01-09

## 2019-01-09 RX ORDER — VANCOMYCIN/0.9 % SOD CHLORIDE 1.5G/250ML
1500 PLASTIC BAG, INJECTION (ML) INTRAVENOUS ONCE
Status: COMPLETED | OUTPATIENT
Start: 2019-01-09 | End: 2019-01-09

## 2019-01-09 RX ORDER — LIDOCAINE HYDROCHLORIDE 20 MG/ML
INJECTION, SOLUTION EPIDURAL; INFILTRATION; INTRACAUDAL; PERINEURAL AS NEEDED
Status: DISCONTINUED | OUTPATIENT
Start: 2019-01-09 | End: 2019-01-09 | Stop reason: HOSPADM

## 2019-01-09 RX ORDER — OXYCODONE AND ACETAMINOPHEN 7.5; 325 MG/1; MG/1
1 TABLET ORAL
Qty: 20 TAB | Refills: 0 | Status: SHIPPED | OUTPATIENT
Start: 2019-01-09 | End: 2019-01-23 | Stop reason: ALTCHOICE

## 2019-01-09 RX ORDER — BUPIVACAINE HYDROCHLORIDE AND EPINEPHRINE 5; 5 MG/ML; UG/ML
INJECTION, SOLUTION EPIDURAL; INTRACAUDAL; PERINEURAL AS NEEDED
Status: DISCONTINUED | OUTPATIENT
Start: 2019-01-09 | End: 2019-01-09 | Stop reason: HOSPADM

## 2019-01-09 RX ADMIN — VANCOMYCIN HYDROCHLORIDE 1500 MG: 10 INJECTION, POWDER, LYOPHILIZED, FOR SOLUTION INTRAVENOUS at 11:44

## 2019-01-09 RX ADMIN — HYDROMORPHONE HYDROCHLORIDE 0.5 MG: 2 INJECTION, SOLUTION INTRAMUSCULAR; INTRAVENOUS; SUBCUTANEOUS at 15:55

## 2019-01-09 RX ADMIN — GLYCOPYRROLATE 0.4 MG: 0.2 INJECTION INTRAMUSCULAR; INTRAVENOUS at 15:28

## 2019-01-09 RX ADMIN — HYDROMORPHONE HYDROCHLORIDE 0.5 MG: 2 INJECTION, SOLUTION INTRAMUSCULAR; INTRAVENOUS; SUBCUTANEOUS at 15:44

## 2019-01-09 RX ADMIN — SODIUM CHLORIDE, SODIUM LACTATE, POTASSIUM CHLORIDE, AND CALCIUM CHLORIDE 100 ML/HR: 600; 310; 30; 20 INJECTION, SOLUTION INTRAVENOUS at 11:43

## 2019-01-09 RX ADMIN — ROCURONIUM BROMIDE 35 MG: 10 INJECTION, SOLUTION INTRAVENOUS at 14:18

## 2019-01-09 RX ADMIN — MIDAZOLAM HYDROCHLORIDE 2 MG: 2 INJECTION, SOLUTION INTRAMUSCULAR; INTRAVENOUS at 13:43

## 2019-01-09 RX ADMIN — FENTANYL CITRATE 100 MCG: 50 INJECTION, SOLUTION INTRAMUSCULAR; INTRAVENOUS at 14:18

## 2019-01-09 RX ADMIN — SODIUM CHLORIDE, SODIUM LACTATE, POTASSIUM CHLORIDE, AND CALCIUM CHLORIDE: 600; 310; 30; 20 INJECTION, SOLUTION INTRAVENOUS at 15:25

## 2019-01-09 RX ADMIN — Medication 3 AMPULE: at 11:57

## 2019-01-09 RX ADMIN — OXYCODONE HYDROCHLORIDE 5 MG: 5 TABLET ORAL at 15:47

## 2019-01-09 RX ADMIN — HYDROMORPHONE HYDROCHLORIDE 0.5 MG: 2 INJECTION, SOLUTION INTRAMUSCULAR; INTRAVENOUS; SUBCUTANEOUS at 15:50

## 2019-01-09 RX ADMIN — LIDOCAINE HYDROCHLORIDE 100 MG: 20 INJECTION, SOLUTION EPIDURAL; INFILTRATION; INTRACAUDAL; PERINEURAL at 14:18

## 2019-01-09 RX ADMIN — NEOSTIGMINE METHYLSULFATE 3 MG: 1 INJECTION INTRAVENOUS at 15:28

## 2019-01-09 RX ADMIN — PROPOFOL 200 MG: 10 INJECTION, EMULSION INTRAVENOUS at 14:18

## 2019-01-09 RX ADMIN — ONDANSETRON 4 MG: 2 INJECTION INTRAMUSCULAR; INTRAVENOUS at 15:19

## 2019-01-09 RX ADMIN — ROCURONIUM BROMIDE 5 MG: 10 INJECTION, SOLUTION INTRAVENOUS at 15:02

## 2019-01-09 NOTE — BRIEF OP NOTE
BRIEF OPERATIVE NOTE Date of Procedure: 1/9/2019 Preoperative Diagnosis: Spinal stenosis, lumbar region, with neurogenic claudication [M48.062] Postoperative Diagnosis: Spinal stenosis, lumbar region, with neurogenic claudication [M48.062] Procedure(s): LEFT L3-4 LAMINECTOMY/DISCECTOMY Surgeon(s) and Role: 
   * Augustine Sabillon MD - Primary Surgical Assistant: NONE Surgical Staff: 
Circ-1: Chrystal Lawrence RN Radiology Technician: Matthew Saleh RT, R, CT Scrub Tech-1: Ivan Payan Scrub Tech-2: Akosua Triana Scrub Tech-Relief: Cara Casas Nurse Practitioner: Otf Victor NP Event Time In Time Out Incision Start [phone] Incision Close Anesthesia: General  
Estimated Blood Loss: MINIMAL Specimens:  
ID Type Source Tests Collected by Time Destination 1 : disc material L3-4 Preservative Disc Material  Augustine Sabillon MD 1/9/2019 1512 Pathology Findings: STENOSIS Complications: NONE Implants: * No implants in log *

## 2019-01-09 NOTE — DISCHARGE INSTRUCTIONS
[facility]  [address], [facility], [address]  [phone]    Postoperative Home Instructions  Lumbar (Back) Surgery    · Showering: You may shower the first day you are home with the dressing on. If the dressing becomes saturated, change it completely to a similar dressing. You have the brown aquacel dressing, leave it alone for 5 days and then you may remove the dressing. Leave the steri-strips or glue in place. Do not soak in a tub, and use only soap and water on the wound. · Wound Care: A small to moderate amount of reddish drainage on the dressing is normal the first 1-2 days after surgery. If the dressing becomes saturated, change it. Once the steri-strips begin to peel up on their own, you may gently take them off. Large amounts of clear, watery drainage is not normal and you should call our office immediately. · Signs of Infection: Extreme tenderness at the wound, excessive redness and/or swelling, or ugly yellowish-greenish drainage from the wound. Fever greater than 100.5 may be present. If you think you have a wound infection, call our office immediately. · Driving: You may not begin driving until after your visit to our office for a wound and suture check which is normally 7-10 days after you come home from the hospital.    · Medications: You should take anti-inflammatory medications such as Motrin, Celebrex for 30 days after surgery, every day, on a regular schedule only if prescribed by your physician. The pain medicine prescribed may be taken as needed. You should take a stool softener (Colace) twice a day, drink lots of water and eat high fiber foods to avoid constipation (this is a common problem with pain medicine.)    · Deep Breathing Exercises: Continue to do your incentive spirometry and/or deep breathing exercises to prevent risk of pneumonia. · Smoking: YOU MAY NOT SMOKE! Smoking will interfere with your healing.   If you smoke, you may end up with having another surgery or more problems! · Activity: No heavy lifting for 4 weeks after surgery. This means anything heavier than a coffee cup or newspaper. After 4 weeks, you may gradually begin lifting heavier things. Avoid bending, stooping, or twisting at the waist.  Do not lie on your stomach to sleep. · You may remove your Brock hose when consistently walking. You may do steps and inclines in moderation. · Sexual Relations: You may resume sexual relations 2 weeks after your surgery. · Walking Program: You should begin walking every day on the first day after surgery. Start for short distances, then go a little farther each day. You should eventually walk 1-2 miles every day for the long term. This is very important in your recovery period because walking strengthens the spinal muscles and will help protect your disc and vertebrae. · Symptoms after Surgery: Dont be alarmed if you still have some symptoms after surgery. The nerves often require time to heal after the pressure has been taken off. Be patient, you should see improvement with time. · Follow-up: You will need to call our office for an appointment to see a nurse one week after surgery for a wound check. An appointment will be made then for you to see your surgeon about 4 weeks after surgery. Please call our office if you have any other questions or problems. Listen to your body; it will tell you if you are overdoing it. Use common sense and take care of yourself! After general anesthesia or intravenous sedation, for 24 hours or while taking prescription Narcotics:  · Limit your activities  · Do not drive and operate hazardous machinery  · Do not make important personal or business decisions  · Do  not drink alcoholic beverages  · If you have not urinated within 8 hours after discharge, please contact your surgeon on call.     *  Please give a list of your current medications to your Primary Care Provider. *  Please update this list whenever your medications are discontinued, doses are      changed, or new medications (including over-the-counter products) are added. *  Please carry medication information at all times in case of emergency situations. These are general instructions for a healthy lifestyle:  No smoking/ No tobacco products/ Avoid exposure to second hand smoke  Surgeon General's Warning:  Quitting smoking now greatly reduces serious risk to your health. Obesity, smoking, and sedentary lifestyle greatly increases your risk for illness  A healthy diet, regular physical exercise & weight monitoring are important for maintaining a healthy lifestyle    Recognize signs and symptoms of STROKE:  F-face looks uneven  A-arms unable to move or move unevenly  S-speech slurred or non-existent  T-time-call 911 as soon as signs and symptoms begin-DO NOT go       Back to bed or wait to see if you get better-TIME IS BRAIN.

## 2019-01-09 NOTE — ANESTHESIA POSTPROCEDURE EVALUATION
Procedure(s): LEFT L3-4 LAMINECTOMY/DISCECTOMY. Anesthesia Post Evaluation Multimodal analgesia: multimodal analgesia used between 6 hours prior to anesthesia start to PACU discharge Patient location during evaluation: bedside Level of consciousness: responsive to physical stimuli and awake Pain management: adequate Airway patency: patent Anesthetic complications: no 
Cardiovascular status: acceptable, stable and hemodynamically stable Respiratory status: unassisted, spontaneous ventilation, nonlabored ventilation and acceptable Hydration status: acceptable Post anesthesia nausea and vomiting:  controlled Visit Vitals /77 Pulse 68 Temp 36.6 °C (97.9 °F) Resp 18 Ht 5' 4.25\" (1.632 m) Wt 94.8 kg (209 lb) SpO2 96% BMI 35.60 kg/m²

## 2019-01-09 NOTE — ANESTHESIA PREPROCEDURE EVALUATION
Anesthetic History Review of Systems / Medical History Patient summary reviewed and pertinent labs reviewed Pulmonary Neuro/Psych Headaches (Migraines) Cardiovascular Exercise tolerance: >4 METS 
  
GI/Hepatic/Renal 
  
 
 
 
PUD (Remote hx) Endo/Other Hypothyroidism: well controlled Obesity, arthritis and cancer (CML -- in remission) Other Findings Comments: Chronic anemia secondary to beta thalassemia DDD S/p ACDF Physical Exam 
 
Airway Mallampati: II 
TM Distance: 4 - 6 cm Neck ROM: decreased range of motion Mouth opening: Normal 
 
 Cardiovascular Regular rate and rhythm,  S1 and S2 normal,  no murmur, click, rub, or gallop Dental 
 
 
  
Pulmonary Breath sounds clear to auscultation Abdominal 
GI exam deferred Other Findings Anesthetic Plan ASA: 2 Anesthesia type: general 
 
 
 
 
 
Anesthetic plan and risks discussed with: Patient

## 2019-01-10 ENCOUNTER — PATIENT OUTREACH (OUTPATIENT)
Dept: OTHER | Age: 59
End: 2019-01-10

## 2019-01-10 NOTE — OP NOTES
[facility] REPORT    Jermain Martinez  MR#: [id number]  : 1960  ACCOUNT #: [de-identified]   DATE OF SERVICE: 2019    PREOPERATIVE DIAGNOSES:  Left L4 radiculopathy secondary to stenosis and disk herniation. POSTOPERATIVE DIAGNOSES:   Left L4 radiculopathy secondary to stenosis and disk herniation. PROCEDURES PERFORMED:  Left L3-L4 laminectomy, facetectomy, foraminotomy, and diskectomy. SURGEON:  Chelsea Ott MD    ANESTHESIA:  General endotracheal.    FIRST ASSISTANT:  None. ESTIMATED BLOOD LOSS:  Minimal.    COMPLICATIONS:  None. IMPLANTS:  None. SPECIMENS REMOVED:  L3-4 disk. HISTORY OF PRESENT ILLNESS:  A 81-year-old lady with intractable back and leg pain refractory to conservative measures. MRI scan was positive for stenosis and a disk herniation causing compression on the left side at L3-L4. The patient was admitted for surgery as conservative measures had failed. OPERATIVE NOTE:  The patient was brought to the operating room and was carefully placed under general endotracheal anesthesia without complications. Carefully turned prone on the Cloward frame and the posterior aspect of the back was shaved and prepped in the usual sterile fashion. An incision was made overlying L3 and L4. Muscles were stripped in the left lateral subperiosteal plane with cautery and elevators. Deep retractors were placed. Lateral lumbar spine x-ray confirmed instrument pointing at the L3-4 disk space. Next, laminectomy and facetectomy were carried out with 3 and 4 mm Kerrison rongeurs. Significant bony ligamentous hypertrophy were removed in a piecemeal fashion. The dura was identified and the L4 nerve root was decompressed with a 3 mm Kerrison rongeur. Medial facetectomy and distal foraminotomy were widened.   The nerve root was mobilized medially to expose extruded disk at the disk space, which was incised with a 15 blade and cleaned with pituitary rongeurs. However, there was not much in the way of pathological disk material in the disk space. There was a slight amount. The disk capsule was bulbous. It was subtotally resected and cauterized. After completion, a ball tip probe was passed easily under the nerve root without further compression. The wound was irrigated until clear. Thrombin was placed. No further bleeding was encountered and the wound was closed. The fascia was  closed tightly with interrupted 0 Vicryl. And 0.5% Marcaine with epinephrine was used to infiltrate the muscle for postoperative analgesia. Subcutaneous tissues were closed with interrupted 3-0 Vicryl. Skin was closed with Exofin tape and dressing. The patient tolerated the procedure well and was turned supine, awakened, extubated, and taken to PACU in stable condition. There were no obvious complications. DISCHARGE INSTRUCTIONS:  DIET:  Regular. ACTIVITY:  As tolerated. No heavy lifting, bending or automobile driving. Showers are permissible but no baths. The patient will contact the physician if she develops any fever, drainage, swelling, cellulitis or neurological change. Return visit in 10-14 days for wound check. DISCHARGE MEDICATIONS:  Include admission medicines plus Percocet 7.5/325 q.8  hours p.r.n. DISCHARGE CONDITION:  Satisfactory.       MD ENRIKE Reina / RN  D: 01/09/2019 15:26     T: 01/10/2019 05:54  JOB #: [id number]

## 2019-01-10 NOTE — PROGRESS NOTES
[facility] progress note Patient eligible for [facility] Employee care management PMH:  
Past Medical History:  
Diagnosis Date  Anemia 2/21/2017  Arthritis  Beta [facility]assem[facility] ([facility]  Beta+ thalassemia ([facility]  Chronic pain   
 neck left arm and shoulder  CML (chronic myeloid leukemia) ([facility] 3/8/2016  
 patient states she is stable at this time  DDD (degenerative disc disease), lumbar   
 herniated disc L2-3, L5-S1 (leaking)-treatment w/epidural injection  Diverticulosis of colon (without mention of hemorrhage) 2015  Hypothyroid   
 levothyroxine for hypo  Insomnia  Internal hemorrhoids without mention of complication 4658  Migraine headache  Obesity (BMI 30-39.9) 01/07/2019 BMI 35.9  PUD (peptic ulcer disease) 2002ish GI bleed s/p use of celebrex; no other issues  Rectocele 2015  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  Spinal stenosis, cervical region Current Outpatient Medications Medication Sig  
 oxyCODONE-acetaminophen (PERCOCET 7.5) 7.5-325 mg per tablet Take 1 Tab by mouth every eight (8) hours as needed for Pain. Max Daily Amount: 3 Tabs.  diphenhydrAMINE-acetaminophen (TYLENOL PM EXTRA STRENGTH)  mg tab Take 1 Tab by mouth nightly as needed.  latanoprost (XALATAN) 0.005 % ophthalmic solution Administer 1 Drop to both eyes nightly.  brimonidine-timolol (COMBIGAN) 0.2-0.5 % drop ophthalmic solution Administer 1 Drop to right eye every twelve (12) hours.  DISABLED PLACARD (DISABLED PLACARD) DMV 6 months  ondansetron (ZOFRAN ODT) 4 mg disintegrating tablet Take 1 Tab by mouth every eight (8) hours as needed for Nausea.  oxyCODONE IR (ROXICODONE) 5 mg immediate release tablet Take 1 Tab by mouth every six (6) hours as needed for Pain.  Max Daily Amount: 20 mg.  
 imatinib (GLEEVEC) 400 mg tablet TAKE 1 TABLET (400MG) BY MOUTH ONCE A DAY WITH FOOD AND WATER. MAY CAUSE NAUSEA, VOMITING, MUSCLE PAIN AND EDEMA (SWELLING). AVOID GRAPEFRUI  
 levothyroxine (SYNTHROID) 150 mcg tablet TAKE ONE TABLET BY MOUTH EVERY DAY BEFORE BREAKFAST  traMADol (ULTRAM) 50 mg tablet Take 1 Tab by mouth every six (6) hours as needed for Pain. Max Daily Amount: 200 mg.  
 valACYclovir (VALTREX) 500 mg tablet Take 2 Tabs by mouth as needed.  clobetasol (TEMOVATE) 0.05 % topical cream Apply  to affected area three (3) times daily.  acetaminophen-caffeine 500-65 mg (EXCEDRIN TENSION HEADACHE) 500-65 mg tab Take 1 Tab by mouth. No current facility-administered medications for this visit. Social History:  
Social History Socioeconomic History  Marital status:  Spouse name: Not on file  Number of children: Not on file  Years of education: Not on file  Highest education level: Not on file Social Needs  Financial resource strain: Not on file  Food insecurity - worry: Not on file  Food insecurity - inability: Not on file  Transportation needs - medical: Not on file  Transportation needs - non-medical: Not on file Occupational History  Not on file Tobacco Use  Smoking status: Never Smoker  Smokeless tobacco: Never Used Substance and Sexual Activity  Alcohol use: No  
 Drug use: No  
 Sexual activity: Not Currently Birth control/protection: Surgical  
  Comment: Hysterectomy Other Topics Concern [address] Service Not Asked  Blood Transfusions Not Asked  Caffeine Concern Not Asked  Occupational Exposure Not Asked Katrina Girt Hazards Not Asked  Sleep Concern Not Asked  Stress Concern Not Asked  Weight Concern Not Asked  Special Diet Not Asked  Back Care Not Asked  Exercise Not Asked  Bike Helmet Not Asked [address] Yes  Self-Exams Yes Social History Narrative Denies physical or sexual abuse Two patient identifiers verified.  
 
Discussed the care management program.  Patient agrees to care management services at this time. Patient has significant diagnosis of DDD and DM Type 2 . Care management assessment completed: 
 
Patient stated problem: \"Post lumbar fusion pain\" Care manager identified primary concern Pain. Emotional Status/Adjustment to Health State: Positive, has very good support system. Readiness to Change: []  Pre-contemplative    []  Contemplative [x]  Preparation               []  Action                  []  Maintenance Barriers/Challenges to Care: []  Decline in memory    []  Language barrier    
[]  Emotional                  [x]  Limited mobility 
[]  Lack of motivation     [] Vision, hearing or cognitive impairment []  Knowledge [] Financial Barriers  []  Other Patient stated goal Get through the next couple of weeks with pain increased pain levels Care Manager/Provider identified medical goal: Safety and pain control Support System/Resources: Patient has a very good support system to include her son, daughter, and mother. Many of them live very close to her, able to be there to assist her, post surgery. Advance Care Planning: Not on file. ADLS/DME: Patient is able to complete all ADL's, however is now recovering from outpatient surgery, so is requiring assistance with ADL's. Referrals: Possible physical therapy, will determine after post surgery follow up on 1/23. Upcoming appointments:  
Future Appointments Date Time Provider Kia Bee 1/23/2019 10:00 AM [address] [place] [address]  
3/7/2019  7:30 AM [facility] OUTREACH INSURANCE [facility]  
3/14/2019  3:45 PM Maricel Elliott MD [facility]  
7/30/2019  8:30 AM Rozina Caballero NP [facility] RF Care Plan for Chronic Disease: 1. Diagnosis 1- Pain control, regulated with medication adherence and alternative pain control methods. 2. Diagnosis 2- DM Type 2, adhere to recommended diet and exercise (when tolerated). 3. Focused Assessment-  Orthopedic Condition Focused Assessment Skin- any open wounds or incisions? yes Description and location of wound- Incision intact, no drainage, redness noted Have you recently had any of the following? Any recent changes in activity no Does patient have incentive spirometer? No, patient is using deep breathing exercises clear lungs. Mobility or assistive device in place yes DME needs addressed yes PT/OT/ST ordered no, patient requesting PT as a possible option following post op follow up. Pain rated as 4/10 to 12/10 described as severe pain at surgery area/site. Numbness or tingling no Weakness no Trouble with coordinating your movement, or change in gait no New or worsening pain to calf, back of knee or groin no Redness, swelling to leg or groin no Fever no Nausea no Vomiting no Chills or clammy skin no Change in urine output no Recent change in urinary or bowel continence no Change in speech no Difficulty swallowing no Dizziness/lightheadedness no Sleep disturbance yes , up due to frequent urination, drinking fluids, and pain. Visual changes no Medication changes? yes 4. Key pt activities to achieve better health:  
[]  Weight loss [x]  Improved diabetic control 
[]  Decreased cholesterol levels 
[]  Decreased blood pressure 
[]   
[] Patient verbalized understanding of all information discussed. Pt has my contact information for any further questions, concerns, or needs. Plan for next call:  Discuss current pain levels, discuss adherence to recommended diet plan and review all medications.

## 2019-01-29 ENCOUNTER — PATIENT OUTREACH (OUTPATIENT)
Dept: OTHER | Age: 59
End: 2019-01-29

## 2019-01-29 NOTE — PROGRESS NOTES
BASIA LARA contacted patient for evaluation. Patient identifiers confirmed, zip code and . Ms. Verona French referred by Saman De La Cruz, Nurse JANE. Financial: 
Ms. Verona French stated that she is not currently working due to back condition. The patient stated that she currently has STD and LTD benefits. She has been approved for STD, she is awaiting her first deposit. It went into effect 19. The amount of the benefit is unknown at this time. Ms. Verona French stated that she applied for Social Security Disability. The adetermination is pending. Household make-up: 
Patient resides alone. Her son temporarily stayed with her after surgery but had to return to his home. Emotional: Ms. Verona French stated that this is the first time in her life that she has lived alone. She  several years ago. Additionally, her children have left the home. She stated that her kemi is strong. Her [employer] family is supportive. Reportedly, there has been numerous ailments of [employer] members which caused a decrease in their visits. Transportation: Ms. Verona French stated that she is able to drive but not for extended periods of time. Nutrition: Ms. Verona French stated that she frequently prepares soups in the crockpot. She also enjoys venison. Ms. Verona French added that she prepares several meal servings which will allow her to eat leftovers for several days. Social: 
Ms. Verona French stated that her mother [de-identified] yo) and son take her to [employer] activities. She is also very active in [employer]. Support system: Ms. Verona French stated that her primary supports are her mother and adult children. Plan of action: 
-  Patient stated that she was agreeable to participating in [address] [place] [address] to address feelings of loss. -  BASIA LARA will contact patient next week after she receives her STD deposit to determine if additional financial    resources needed to be explored.

## 2019-01-29 NOTE — PROGRESS NOTES
[facility] Telephonic outreach to patient to follow up with patient. Verified  and zip code. Patient reports that she had a wound check follow up appointment with the Nurse Practitioner. Wound looks good, no signs of infection, healing nicely. Patient reports that she is still having a great deal of pain. The acute pain has been resolved from the surgery, however the patient continues to have the chronic back discomfort from DDD. Patient was given exercises to complete at home to improve core strength and balance. Patient is using her cane for support and balance, primarily when she goes outside. Patient tearful and stating that she is depressed with all of that is happening and being told that there is nothing more that pain management or surgery can do to improve her chronic pain. Patient is able to drive and is taking pain medication when needing it, to include Tramadol and Excedrin ES. Patient also has Percocet, however tries not to take this medication unless it is necessary. Patient is not resting fully through out the night, waking up in the middle of the night, pretty routinely related to nerve pain. Discussed FMLA, follow up appointments and the option of applying for disability. Referring the patient to [facility] MSW Santos Vincent for assistance regarding disability application. Encouraged the patient to reach out to this care manager if assistance is needed, and will follow up in 2-3 weeks to discuss next steps for pain and mobility improvement.

## 2019-02-05 ENCOUNTER — PATIENT OUTREACH (OUTPATIENT)
Dept: OTHER | Age: 59
End: 2019-02-05

## 2019-02-06 ENCOUNTER — PATIENT OUTREACH (OUTPATIENT)
Dept: OTHER | Age: 59
End: 2019-02-06

## 2019-02-06 NOTE — PROGRESS NOTES
BASIA LARA contacted patient for follow-up. Patient identifiers confirmed, zip code and . Patient Updates Ms. Deshawn Wilkerson stated that she received her STD payment. She is scheduled to receive payments twice a month. She added that the STD funds are adequate to cover his expenses. Ms. Deshawn Wilkerson stated that she still have the contact information for [employer] in the event that she needs an additional short term income sources. Ms. Deshawn Wilkerson stated that her 15year old son is currently residing with her for a few days. He has the flu and is unable to return to school. Ms. Deshawn Wilkerson stated that she had her flu shot and enjoys his company. Ms. Deshawn Wilkerson stated that she has not been contacted by Jeraldine Bamberger yet. Plan of Action Ms. Deshawn Wilkerson stated that she has a follow-up doctor appt on 19. BASIA LARA will call patient following that appt.

## 2019-02-11 ENCOUNTER — DOCUMENTATION ONLY (OUTPATIENT)
Dept: OTHER | Age: 59
End: 2019-02-11

## 2019-02-11 NOTE — PROGRESS NOTES
Able To referral confirmation    Dear Fern Quiros,    Thank you very much for this referral! We will reach out promptly and share information about the [address] [place] [address] Programs with this member. After scheduling an Initial Consultation, we will send a detailed email including date and time of the scheduled appointment. You can track the members status on your Care Coordinator Dashboard, where you will find At-a-Glance Information, Expanded Clinical Summaries, and Session Descriptions. Please feel free to contact me directly with any questions. Additionally, Vance Frost, , can be reached directly at [email or web address] for any clinical inquiries related to this referral or any other members you are serving. *Please note that we will be calling this member from a toll-free number. Kind Regards,    [facility]  [address] [facility], 7th Floor,  [place], [address]  Email: [email or web address]  Phone: [phone]  Website: [email or web address]  Was this message helpful? Yes     It was OK     No       On Fri, 8th Feb 2019 at 5:32 pm, [email or web address] wrote:    Referrer Information  User Id: [id number]  Name: [email or web address]: N/A  Phone: [phone]   participant_referral_id: [id number]  Form Data  Name: Lore Rollins  Email:   Primary Phone: [phone] - Home  Address: [address] [place] [address]  Zip Code: [address]  Birthday: 06 10, 1960  Gender: female  Insurance Id: [id number]  Insurance Carrier: [employer]  Relation: Self  Preferred Language: English  Preferred Call Time:   Recommended Program:   Notes: Language: English / Slovak. Preferred Call Time: Late morning (EST). Recommended Program / Comments: May be unable to return to work due to medical issues. Email referral.              View and manage this ticket online: [email or web address]       [email or web address] · [email or web address]

## 2019-02-18 ENCOUNTER — PATIENT OUTREACH (OUTPATIENT)
Dept: OTHER | Age: 59
End: 2019-02-18

## 2019-02-18 NOTE — PROGRESS NOTES
[facility] Telephonic outreach attempt to follow up with patient and assess current progress. Reaching out to determine if contact has been made by Amy Lynne, following referral. 
Left a discreet VM with a request for a return call. Will follow up with patient later this month.

## 2019-02-18 NOTE — PROGRESS NOTES
[facility] Telephonic incoming call from patient, returning this care managers call from earlier today. Patient verified her  and zip code. Patient informed this care manager that she is having a difficult time right now. She is still experiencing pain and discomfort related to her surgery. Patient has also been visiting her boyfriend at the hospital, who was recently found without a heartbeat, on life support and a ventilator at the hospital. Patient was tearful at times throughout the conversation. Patient is having difficult sleeping, which has now worsened. Patient stated that she has only has a few Tramadol left and will have to reach out to her PCP for a refill, as the prescription came from the PCP. Patient has a few Oxycodone left, but only using them if in extreme pain. Encouraged the patient to refrain from driving if taking medication and patient recognized this and agreed. Patient received a call from her  while on the call with this care manager. Her  reported that they sent a mobility form for disability to Dr. Arsh Irvin to be completed. There is some confusion regarding Dr. Arsh Irvin completing the form. Patient has an appointment with him tomorrow and plans to discuss the form with him at that time. Patient has been working with [facility] and her  to file for disability. Patient did receive a call from [address] [place] [address]. An assessment was completed and the patient was waiting to hear from her behavioral , Ms. Ge Argue this afternoon. Patient realized that she had missed her call while on the phone with this care manager. Patient encouraged to call Ms. Julissa Leone back, as having a therapist/behavioral  to talk about her stress and feelings of depression would be helpful to her. This care manager will follow up with patient tomorrow, to check in and offer assistance.

## 2019-02-19 ENCOUNTER — PATIENT OUTREACH (OUTPATIENT)
Dept: OTHER | Age: 59
End: 2019-02-19

## 2019-02-19 PROBLEM — M54.16 LUMBAR RADICULOPATHY, CHRONIC: Status: ACTIVE | Noted: 2019-02-19

## 2019-02-19 NOTE — PROGRESS NOTES
[facility] Telephonic outreach to follow up with patient after phone call yesterday, . Patient answered the call and verified  and Zip code. Patient was sitting the her surgeon's office parking lot, ready to go into see him for follow up and to address disability paperwork. Patient reported on the status of her boyfriend, who remains in ICU in the hospital. Patient is attempting to remain positive and keeping her kemi strong. She plans to return to the hospital after her appointment. Offered patient comfort and support during this difficult time. Patient did report that she spoke with [facility] behavioral  yesterday afternoon as well. Will follow up with patient early next week. She was encouraged to call this care manager for support and with any questions regarding her disability or back pain.

## 2019-02-22 ENCOUNTER — HOSPITAL ENCOUNTER (OUTPATIENT)
Dept: LAB | Age: 59
Discharge: HOME | End: 2019-02-22
Payer: COMMERCIAL

## 2019-02-22 DIAGNOSIS — L24.A9 WOUND DRAINAGE: ICD-10-CM

## 2019-02-22 PROCEDURE — 87205 SMEAR GRAM STAIN: CPT

## 2019-02-24 LAB
BACTERIA SPEC CULT: NORMAL
GRAM STN SPEC: NORMAL
GRAM STN SPEC: NORMAL
SERVICE CMNT-IMP: NORMAL

## 2019-02-25 ENCOUNTER — PATIENT OUTREACH (OUTPATIENT)
Dept: OTHER | Age: 59
End: 2019-02-25

## 2019-02-25 NOTE — PROGRESS NOTES
BASIA LARA contacted patient for follow-up visit. Patient identifiers confirmed, zip code and . Patient updates Emotional Stressors Ms. Ela Sesay stated that her boyfriend was taken off of life support on 19. The patient stated that her uncle which was the oldest male in her family passed away last weekend. Physical 
Ms. Ela Sesay shared that she will start PT on 19. She added that she experienced clear drainage last week from her incision site. She is scheduled to return to the surgeon tomorrow. Mental Health Support Ms. Ela Sesay stated that she has sessions with Sandra Meyer from  To  at [address]  
Ms. Ela Sesay stated that she cashed out half of her [employer] half-way funds to assist her with living expenses while she awaits Social Security Disability determination. Family Support Ms. Ela Sesay stated that she has a supportive family. Her mother lives in close proximity. She is [de-identified]years old. Her adult children are also supportive although they have their own residences. Plan of action BASIA LARA will continue to follow-up to provide additional support.

## 2019-02-26 ENCOUNTER — PATIENT OUTREACH (OUTPATIENT)
Dept: OTHER | Age: 59
End: 2019-02-26

## 2019-02-27 ENCOUNTER — HOSPITAL ENCOUNTER (OUTPATIENT)
Dept: PHYSICAL THERAPY | Age: 59
Discharge: HOME | End: 2019-02-27
Payer: COMMERCIAL

## 2019-02-27 PROCEDURE — 97162 PT EVAL MOD COMPLEX 30 MIN: CPT

## 2019-02-27 PROCEDURE — 97110 THERAPEUTIC EXERCISES: CPT

## 2019-02-27 NOTE — THERAPY EVALUATION
Amalia Yeager  : 1960  Primary: [facility]  Secondary:  [address] at [facility]  [address] [facility], [facility], [address] [place] [address]  [phone]   [phone]       OUTPATIENT PHYSICAL THERAPY:Initial Assessment 2019    ICD-10: Treatment Diagnosis: Radiculopathy, lumbar (M54.16)  Treatment diagnosis 2: Low back pain (M54.5)  Treatment diagnosis 3: Pain in left leg (M79.605)  Precautions: Cervical fusion, Right knee replacement. No bending, twisting, or lifting greater than 15 lbs  Allergies: Latex, natural rubber; Latex; Contrast agent [iodine]; Hydrocodone-acetaminophen; Meperidine; and Other medication   MD Orders: evaluate and treat  MEDICAL/REFERRING DIAGNOSIS:  Radiculopathy, lumbar region [M54.16]   DATE OF ONSET: Left L3-L4 laminectomy on 19  REFERRING PHYSICIAN: Coleen Swann MD  RETURN PHYSICIAN APPOINTMENT: May 2019     INITIAL ASSESSMENT:  Ms. Vilma Oshea is a 63 y.o. female presenting to physical therapy s/p left L3-L4 laminectomy, foraminotomy, facetectomy, and diskectomy on 19. Patient reports chronic and progressive pain in lumbar spine and left lower extremity prior to surgery. Patient now reports decreased pain in low back and left leg (80% improvement) since having surgery but continues to report increased pain with prolonged standing and activities, and weakness/instability in left leg. At worst, patient reports 6/10 pain in lumbar spine, and 5/10 pain in left leg. Patient reports that MD states that he was unable to completely \"fix\" issues in spine and recommends that she apply for disability. 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 Patient reports of history of cervical fusion, right knee replacement, and leukemia. Patient is a good candidate for skilled physical therapy services to include manual therapy, therapeutic exercise, and pain modalities as needed. PROBLEM LIST (Impacting functional limitations):  1. Decreased Strength  2. Decreased ADL/Functional Activities  3. Decreased Ambulation Ability/Technique  4. Decreased Balance  5. Increased Pain  6. Decreased Activity Tolerance  7. Increased Fatigue  8. Decreased Flexibility/Joint Mobility INTERVENTIONS PLANNED:  1. Balance Exercise  2. Bed Mobility  3. Cold  4. Cryotherapy  5. Electrical Stimulation  6. Gait Training  7. Heat  8. Home Exercise Program (HEP)  9. Manual Therapy  10. Neuromuscular Re-education/Strengthening  11. Range of Motion (ROM)  12. Therapeutic Activites  13. Therapeutic Exercise/Strengthening  14. Transcutaneous Electrical Nerve Stimulation (TENS)  15. Ultrasound (US)   TREATMENT PLAN:  Effective Dates: 2/27/2019 TO 4/13/2019 (45 days). Frequency/Duration: 2 times a week for 45 Days  GOALS: (Goals have been discussed and agreed upon with patient.)  Short-Term Functional Goals: Time Frame: 2/27/19 to 3/13/19  1. Patient will be independent with home program to increase hip stability and core muscle activation. 2. Patient will report no more than 5/10 pain in low back with ADL's. Discharge Goals: Time Frame: 2/27/19 to 4/13/19  1. Patient will report no more than 3/10 pain in low back with prolonged standing and walking. 2. Patient will report no more than 2/10 pain in left leg with prolonged standing and walking. 3. Patient will report performing vacuuming and sweeping at home with no more than 2/10 pain. 4. Patient will perform all active lumbar motions with 0/10 pain. 5. Patient will report walking for greater than 30 minutes with no increase in pain. 6. Patient will perform single leg balance greater than 10 seconds bilaterally. 7. Patient will improve Modified Oswestry score to 13/50 from 27/50.   Rehabilitation Potential For Stated Goals: Good  Regarding Aura Martins's therapy, I certify that the treatment plan above will be carried out by a therapist or under their direction. Thank you for this referral,  Baljinder Dao PT     Referring Physician Signature: Brinda Connelly MD              Date                    The information in this section was collected on 2/27/19 (except where otherwise noted). HISTORY:   History of Present Injury/Illness (Reason for Referral):  Ms. Gualberto Mays is a 63 y.o. female presenting to physical therapy s/p left L3-L4 laminectomy, foraminotomy, facetectomy, and diskectomy on 1/9/19. Patient reports chronic and progressive pain in lumbar spine and left lower extremity prior to surgery. Patient now reports decreased pain in low back and left leg (80% improvement) since having surgery but continues to report increased pain with prolonged standing and activities, and weakness/instability in left leg. At worst, patient reports 6/10 pain in lumbar spine, and 5/10 pain in left leg. Patient reports that MD states that he was unable to completely \"fix\" issues in spine and recommends that she apply for disability. 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 Patient reports of history of cervical fusion, right knee replacement, and leukemia. Past Medical History/Comorbidities:   Ms. Gualberto Mays  has a past medical history of Anemia (2/21/2017), Arthritis, Beta thalassemia ([facility] [address] Beta+ thalassemia ([facility] [address] Chronic pain, CML (chronic myeloid leukemia) ([facility] [address] (3/8/2016), DDD (degenerative disc disease), lumbar, Diverticulosis of colon (without mention of hemorrhage) (2015), Hypothyroid, Insomnia, Internal hemorrhoids without mention of complication (7673), Migraine headache, Obesity (BMI 30-39.9) (01/07/2019), PUD (peptic ulcer disease) (2002ish), Rectocele (2015), S/P total knee replacement using cement (10/5/2011), and Spinal stenosis, cervical region.   Ms. Gualberto Mays  has a past surgical history that includes hx appendectomy (2007); pr anesth,surgery of shoulder (Left, 1993); hx wisdom teeth extraction (1979); hx cystocele repair (1991); hx rectocele repair (1991); hx colonoscopy (last 1/19/15); hx knee replacement (Right, 2011); hx cyst incision and drainage (Right, 2009); hx hysterectomy (1991); hx tubal ligation (1986); hx hysterectomy (1991); hx cervical fusion (2004, March 2017); and hx lumbar laminectomy (01/09/2019). Social History/Living Environment:    Patient lives alone  Prior Level of Function/Work/Activity:  Patient previously worked as medical assistant but is planning of filing for disability due to chronic pain and disability in neck and low back. Patient does not plan to return to work. Dominant Side:         RIGHT     Ambulatory/Rehab Services H2 Model Falls Risk Assessment    Risk Factors:       No Risk Factors Identified Ability to Rise from Chair:       (1)  Pushes up, successful in one attempt    Falls Prevention Plan:       No modifications necessary   Total: (5 or greater = High Risk): 1    ©2010 [facility] [address]. All Rights Reserved. [facility] Patent #[id number]. Federal Law prohibits the replication, distribution or use without written permission from [facility] [employer]       Current Medications:       Current Outpatient Medications:     cephALEXin (KEFLEX) 500 mg capsule, Take 1 Cap by mouth four (4) times daily. , Disp: 40 Cap, Rfl: 0    traMADol (ULTRAM) 50 mg tablet, Take 1 Tab by mouth every six (6) hours as needed for Pain. Max Daily Amount: 200 mg., Disp: 30 Tab, Rfl: 0    levothyroxine (SYNTHROID) 150 mcg tablet, TAKE ONE TABLET BY MOUTH EVERY DAY BEFORE BREAKFAST, Disp: 90 Tab, Rfl: 1    diphenhydrAMINE-acetaminophen (TYLENOL PM EXTRA STRENGTH)  mg tab, Take 1 Tab by mouth nightly as needed. , Disp: , Rfl:     latanoprost (XALATAN) 0.005 % ophthalmic solution, Administer 1 Drop to both eyes nightly., Disp: , Rfl:     brimonidine-timolol (COMBIGAN) 0.2-0.5 % drop ophthalmic solution, Administer 1 Drop to right eye every twelve (12) hours. , Disp: , Rfl:    DISABLED PLACARD (DISABLED PLACARD) DMV, 6 months, Disp: 1 Each, Rfl: 0    ondansetron (ZOFRAN ODT) 4 mg disintegrating tablet, Take 1 Tab by mouth every eight (8) hours as needed for Nausea., Disp: 12 Tab, Rfl: 0    imatinib (GLEEVEC) 400 mg tablet, TAKE 1 TABLET (400MG) BY MOUTH ONCE A DAY WITH FOOD AND WATER. MAY CAUSE NAUSEA, VOMITING, MUSCLE PAIN AND EDEMA (SWELLING). AVOID GRAPEFRUI, Disp: 30 Tab, Rfl: 1    valACYclovir (VALTREX) 500 mg tablet, Take 2 Tabs by mouth as needed. , Disp: 90 Tab, Rfl: 3    clobetasol (TEMOVATE) 0.05 % topical cream, Apply  to affected area three (3) times daily. , Disp: 15 g, Rfl: 6    acetaminophen-caffeine 500-65 mg (EXCEDRIN TENSION HEADACHE) 500-65 mg tab, Take 1 Tab by mouth., Disp: , Rfl:    Date Last Reviewed:  2/27/2019   Number of Personal Factors/Comorbidities that affect the Plan of Care:  (cervical fusion, leukemia, increase BMI) 3+: HIGH COMPLEXITY   EXAMINATION:   Observation/Orthostatic Postural Assessment:          Patient ambulates independently but with slight decreased stance time on left leg. Palpation:          Mild tenderness at lumbosacral paraspinals. ROM:      Lumbar AROM not tested due to restrictions. Hip flexion: 100 degrees bilaterally  Bilateral knees 0-125 degrees. Marked tightness in bilateral hamstrings. Strength:            Gross strength in right leg is 5/5  Gross strength in left leg is 4/5  Hip flexion: left=4-/5, right=4+/5  Hip abduction: left=4-/5, right=4/5    Poor control with movements against resistance in left leg.      Special Tests:            Passive straight leg raise: negative    Neurological Screen:        Myotomes:  Bilateral lower extremities are normal        Dermatomes:  Intact for light touch and sensation        Reflexes:  Patellar=2+ bilaterally        Mild clonus in left ankle with rapid dorsiflexion (3-4 beats)    Functional Mobility:         Transfers:  Minor difficulty        Bed Mobility:  Mild to moderate difficulty    Balance:          Single leg stance: left=5-10 seconds, right=greater than 10 seconds     Body Structures Involved:  1. Nerves  2. Bones  3. Joints  4. Muscles Body Functions Affected:  1. Sensory/Pain  2. Immune  3. Neuromusculoskeletal  4. Movement Related Activities and Participation Affected:  1. General Tasks and Demands  2. Mobility  3. Self Care  4. Domestic Life  5. Community, Social and [place]   Number of elements (examined above) that affect the Plan of Care: 4+: HIGH COMPLEXITY   CLINICAL PRESENTATION:   Presentation: Evolving clinical presentation with changing clinical characteristics: MODERATE COMPLEXITY   CLINICAL DECISION MAKING:   Outcome Measure: Tool Used: Modified Oswestry Low Back Pain Questionnaire  Score:  Initial: 27/50  Most Recent: X/50 (Date: -- )   Interpretation of Score: Each section is scored on a 0-5 scale, 5 representing the greatest disability. The scores of each section are added together for a total score of 50. Medical Necessity:   · Patient is expected to demonstrate progress in strength, range of motion, balance, coordination and functional technique to decrease pain, increase hip and core strength, and increase tolerance for weight bearing and ADL's. · Skilled intervention continues to be required due to pain and limitation in low back and left lower extremity. Reason for Services/Other Comments:  · Patient continues to require skilled intervention due to pain and limitation in low back and left lower extremity.    Use of outcome tool(s) and clinical judgement create a POC that gives a:  (mulitple co-morbidities, moderate pain, moderate limitation outcome measure) Questionable prediction of patient's progress: MODERATE COMPLEXITY

## 2019-02-27 NOTE — PROGRESS NOTES
Roni Amin  : 1960  Primary: [address]  Secondary:  [address] at [address]  [address], Jeff l[place]ua, [address]  [phone]   [phone]      OUTPATIENT PHYSICAL THERAPY: Daily Treatment Note 2019    Pre-treatment Symptoms/Complaints:  Patient report decreased pain in low back and left lower extremity since having surgery but continues to report increased pain with prolonged standing and walking. Pain: Initial: Pain Intensity 1: 4  Pain Location 1: Back  Pain Orientation 1: Lower  Post Session:  4/10   Medications Last Reviewed:  19  Precautions: No bending, twisting, or lifting greater than 15 lbs  Date of Onset: Left L3-L4 laminectomy on 19  Updated Objective Findings:  See evaluation note from today   TREATMENT:   THERAPEUTIC EXERCISE: (15 minutes):  Exercises per grid below to improve mobility, strength, balance and coordination. Required minimal visual, verbal and manual cues to promote proper body alignment, promote proper body posture and promote proper body mechanics. Progressed resistance, range, repetitions and complexity of movement as indicated. MANUAL THERAPY: None today   Date:  19 Date:   Date:     Activity/Exercise Parameters Parameters Parameters   clamshells 1x10, B     bridge 1x10     Abdominal bracing Practice, with ball squeeze and transverse contraction     Hamstring stretch [id number], B, supine                             Treatment/Session Summary:    · Response to Treatment:  Patient reports no issues with exercises. · Communication/Consultation:  patient showed good understanding of home program  · Equipment provided today:  None today  · Recommendations/Intent for next treatment session: Next visit will focus on progression of all activities.   Treatment Plan of Care Effective Dates: 19 to 19  Total Treatment Billable Duration:  50 minutes (35 minute evaluation, 15 minutes exercise)    PT Patient Time In/Time Out  Time In: 1440  Time Out: 2102 [facility],

## 2019-03-01 ENCOUNTER — HOSPITAL ENCOUNTER (OUTPATIENT)
Dept: PHYSICAL THERAPY | Age: 59
Discharge: HOME | End: 2019-03-01
Payer: COMMERCIAL

## 2019-03-01 ENCOUNTER — HOSPITAL ENCOUNTER (OUTPATIENT)
Dept: LAB | Age: 59
Discharge: HOME | End: 2019-03-01
Payer: COMMERCIAL

## 2019-03-01 DIAGNOSIS — L24.A9 WOUND DRAINAGE: ICD-10-CM

## 2019-03-01 PROCEDURE — 87205 SMEAR GRAM STAIN: CPT

## 2019-03-01 PROCEDURE — 97110 THERAPEUTIC EXERCISES: CPT

## 2019-03-01 NOTE — PROGRESS NOTES
Beatriz Ailyn : 1960 Primary: [facility] Secondary:  Therapy Center at [address], [place], [address] [place] [address] [phone]   [phone] OUTPATIENT PHYSICAL THERAPY: Daily Treatment Note 3/1/2019 Pre-treatment Symptoms/Complaints: Pt. Reported her incision site where she had a blister and fluid was worse today and she will be calling Mary Fowler at Dr. Brennan Dorado office today. Pt. Needed to end session early to go by Dr. Brennan Dorado office. Pain: Initial: Pain Intensity 1: 4 Pain Location 1: Back Pain Orientation 1: Lower  Post Session:  2/10 Medications Last Reviewed:  19 Precautions: No bending, twisting, or lifting greater than 15 lbs Date of Onset: Left L3-L4 laminectomy on 19 Updated Objective Findings:  See evaluation note from today TREATMENT:  
THERAPEUTIC EXERCISE: (45 mins):  Exercises per grid below to improve mobility, strength, balance and coordination. Required minimal visual, verbal and manual cues to promote proper body alignment, promote proper body posture and promote proper body mechanics. Progressed resistance, range, repetitions and complexity of movement as indicated. MANUAL THERAPY: None today Date: 
19 Date: 
3/1/19 Date: Activity/Exercise Parameters Parameters Parameters  
clamshells 1x10, B  x 10 reps each    
bridge 1x10 2 X 10 reps 5 sec hold each Abdominal bracing Practice, with ball squeeze and transverse contraction With ball squeeze and TA contractions Hamstring stretch [id number], B, supine 4x30 sec hold with green strap each leg Hip adduction   X 10 reps x 10 sec hold with yellow ball and TA contractions Single knee to chest  4x30 sec hold each leg Piriformis stretch   4x30 sec hold each leg Pelvic tilts   X 10 reps Isometric hip contraction   Both legs x 10 reps 5 sec hold Nu step   Level 2 x 6 mins Treatment/Session Summary: · Response to Treatment:  Pt. was compliant with all exercises. Pt. reported feeling better. · Communication/Consultation:  patient showed good understanding of home program 
· Equipment provided today:  None today · Recommendations/Intent for next treatment session: Next visit will focus on progression of all activities. Treatment Plan of Care Effective Dates: 2/27/19 to 4/13/19 Total Treatment Billable Duration:  45 mins PT Patient Time In/Time Out Time In: 0800 Time Out: 7988 Domenica William, PTA

## 2019-03-03 LAB
BACTERIA SPEC CULT: NORMAL
GRAM STN SPEC: NORMAL
GRAM STN SPEC: NORMAL
SERVICE CMNT-IMP: NORMAL

## 2019-03-05 ENCOUNTER — HOSPITAL ENCOUNTER (OUTPATIENT)
Dept: PHYSICAL THERAPY | Age: 59
Discharge: HOME | End: 2019-03-05
Payer: COMMERCIAL

## 2019-03-05 PROCEDURE — 97110 THERAPEUTIC EXERCISES: CPT

## 2019-03-05 NOTE — PROGRESS NOTES
Abhijit Carlos  : 1960  Primary: [address]  Secondary:  [address] at [facility]  1900 [facility], [address]  [phone]   [phone]      OUTPATIENT PHYSICAL THERAPY: Daily Treatment Note 3/5/2019    Pre-treatment Symptoms/Complaints: Pt. Reported she went by Dr. Jenni Tejada office and they lanced the site open to drain fluid and put her on keflex antibiotic. Today she will go back to Dr. Jenni Tejada office for a recheck. Pain: Initial: Pain Intensity 1: 0  Pain Location 1: Back  Pain Orientation 1: Lower  Post Session:  2/10   Medications Last Reviewed:  19  Precautions: No bending, twisting, or lifting greater than 15 lbs  Date of Onset: Left L3-L4 laminectomy on 19  Updated Objective Findings:  See evaluation note from today   TREATMENT:   THERAPEUTIC EXERCISE: (55 mins):  Exercises per grid below to improve mobility, strength, balance and coordination. Required minimal visual, verbal and manual cues to promote proper body alignment, promote proper body posture and promote proper body mechanics. Progressed resistance, range, repetitions and complexity of movement as indicated.   MANUAL THERAPY: None today   Date:  19 Date:  3/1/19 Date:  3/5/19   Activity/Exercise Parameters Parameters Parameters   clamshells 1x10, B  x 10 reps each  X 10reps each    bridge 1x10 2 X 10 reps 5 sec hold each  2x10 reps 5 sec hold each   Abdominal bracing Practice, with ball squeeze and transverse contraction With ball squeeze and TA contractions With ball squeeze and TA contractions    Hamstring stretch [id number], B, supine 4x30 sec hold with green strap each leg 4x30 sec hold each leg with green strap    Hip adduction   X 10 reps x 10 sec hold with yellow ball and TA contractions X 10 reps x 10 sec hold each with yellow ball & TA contractions    Single knee to chest  4x30 sec hold each leg 4x30 sec hold each each    Piriformis stretch   4x30 sec hold each leg 4x30 sec hold each leg    Pelvic tilts   X 10 reps  X 10 reps    Isometric hip contraction   Both legs x 10 reps 5 sec hold Both legs x 10 reps 5 sec hold    Nu step   Level 2 x 6 mins  Level 2 x 12 mins    I T band    4x30 sec hold each LE with green band               Treatment/Session Summary:    · Response to Treatment:  Pt. Was compliant with all exercises and minimal increase of pain. · Communication/Consultation:  patient showed good understanding of home program  · Equipment provided today:  None today  · Recommendations/Intent for next treatment session: Next visit will focus on progression of all activities.   Treatment Plan of Care Effective Dates: 2/27/19 to 4/13/19  Total Treatment Billable Duration:  55 mins    PT Patient Time In/Time Out  Time In: 0800  Time Out: 0900  Keri Carvajal, LOAN

## 2019-03-07 ENCOUNTER — HOSPITAL ENCOUNTER (OUTPATIENT)
Dept: PHYSICAL THERAPY | Age: 59
Discharge: HOME | End: 2019-03-07
Payer: COMMERCIAL

## 2019-03-07 ENCOUNTER — HOSPITAL ENCOUNTER (OUTPATIENT)
Dept: LAB | Age: 59
Discharge: HOME | End: 2019-03-07
Payer: COMMERCIAL

## 2019-03-07 DIAGNOSIS — C92.10 CML (CHRONIC MYELOID LEUKEMIA) (HCC): ICD-10-CM

## 2019-03-07 LAB
ALBUMIN SERPL-MCNC: 4 G/DL (ref 3.5–5)
ALBUMIN/GLOB SERPL: 1.2 {RATIO} (ref 1.2–3.5)
ALP SERPL-CCNC: 87 U/L (ref 50–136)
ALT SERPL-CCNC: 23 U/L (ref 12–65)
ANION GAP SERPL CALC-SCNC: 6 MMOL/L (ref 7–16)
AST SERPL-CCNC: 17 U/L (ref 15–37)
BASOPHILS # BLD: 0.1 K/UL (ref 0–0.2)
BASOPHILS NFR BLD: 1 % (ref 0–2)
BILIRUB SERPL-MCNC: 0.4 MG/DL (ref 0.2–1.1)
BUN SERPL-MCNC: 11 MG/DL (ref 6–23)
CALCIUM SERPL-MCNC: 9.2 MG/DL (ref 8.3–10.4)
CHLORIDE SERPL-SCNC: 109 MMOL/L (ref 98–107)
CO2 SERPL-SCNC: 27 MMOL/L (ref 21–32)
CREAT SERPL-MCNC: 0.65 MG/DL (ref 0.6–1)
DIFFERENTIAL METHOD BLD: ABNORMAL
EOSINOPHIL # BLD: 0.2 K/UL (ref 0–0.8)
EOSINOPHIL NFR BLD: 3 % (ref 0.5–7.8)
ERYTHROCYTE [DISTWIDTH] IN BLOOD BY AUTOMATED COUNT: 14.9 % (ref 11.9–14.6)
GLOBULIN SER CALC-MCNC: 3.4 G/DL (ref 2.3–3.5)
GLUCOSE SERPL-MCNC: 111 MG/DL (ref 65–100)
HCT VFR BLD AUTO: 31.8 % (ref 35.8–46.3)
HGB BLD-MCNC: 9.6 G/DL (ref 11.7–15.4)
IMM GRANULOCYTES # BLD AUTO: 0 K/UL (ref 0–0.5)
IMM GRANULOCYTES NFR BLD AUTO: 0 % (ref 0–5)
LYMPHOCYTES # BLD: 1.8 K/UL (ref 0.5–4.6)
LYMPHOCYTES NFR BLD: 24 % (ref 13–44)
MCH RBC QN AUTO: 21.9 PG (ref 26.1–32.9)
MCHC RBC AUTO-ENTMCNC: 30.2 G/DL (ref 31.4–35)
MCV RBC AUTO: 72.4 FL (ref 79.6–97.8)
MONOCYTES # BLD: 0.7 K/UL (ref 0.1–1.3)
MONOCYTES NFR BLD: 9 % (ref 4–12)
NEUTS SEG # BLD: 4.6 K/UL (ref 1.7–8.2)
NEUTS SEG NFR BLD: 63 % (ref 43–78)
NRBC # BLD: 0 K/UL (ref 0–0.2)
PLATELET # BLD AUTO: 274 K/UL (ref 150–450)
PMV BLD AUTO: 11.1 FL (ref 9.4–12.3)
POTASSIUM SERPL-SCNC: 4 MMOL/L (ref 3.5–5.1)
PROT SERPL-MCNC: 7.4 G/DL (ref 6.3–8.2)
RBC # BLD AUTO: 4.39 M/UL (ref 4.05–5.25)
REFERENCE LAB,REFLB: NORMAL
SODIUM SERPL-SCNC: 142 MMOL/L (ref 136–145)
TEST DESCRIPTION:,ATST: NORMAL
WBC # BLD AUTO: 7.4 K/UL (ref 4.3–11.1)

## 2019-03-07 PROCEDURE — 97110 THERAPEUTIC EXERCISES: CPT

## 2019-03-07 PROCEDURE — 80053 COMPREHEN METABOLIC PANEL: CPT

## 2019-03-07 PROCEDURE — 97140 MANUAL THERAPY 1/> REGIONS: CPT

## 2019-03-07 PROCEDURE — 36415 COLL VENOUS BLD VENIPUNCTURE: CPT

## 2019-03-07 PROCEDURE — 85025 COMPLETE CBC W/AUTO DIFF WBC: CPT

## 2019-03-07 NOTE — PROGRESS NOTES
Chelle Yan  : 1960  Primary: [address]  Secondary:  [address]  at [address], [address]  [phone]   [phone]      OUTPATIENT PHYSICAL THERAPY: Daily Treatment Note 3/7/2019    Pre-treatment Symptoms/Complaints: Patient report moderate pain today after standing a lot earlier in the day. Patient went back to MD regarding incision healing and has been cleared for all possible infection. Patient to follow up with MD in May 2019. Pain: Initial: Pain Intensity 1: 3  Pain Location 1: Back  Pain Orientation 1: Lower  Post Session:  2/10   Medications Last Reviewed:  19  Precautions: No bending, twisting, or lifting greater than 15 lbs  Date of Onset: Left L3-L4 laminectomy on 19  Updated Objective Findings:  None Today   TREATMENT:   THERAPEUTIC EXERCISE: (40 mins):  Exercises per grid below to improve mobility, strength, balance and coordination. Required minimal visual, verbal and manual cues to promote proper body alignment, promote proper body posture and promote proper body mechanics. Progressed resistance, range, repetitions and complexity of movement as indicated.   MANUAL THERAPY: (15 minutes) Soft tissue mobilization to bilateral lumbar paraspinals in side lying to decrease pain     Date:  3/7/19 Date:  3/1/19 Date:  3/5/19   Activity/Exercise Parameters Parameters Parameters   clamshells 2 min, B  x 10 reps each  X 10reps each    bridge 1 min 2 X 10 reps 5 sec hold each  2x10 reps 5 sec hold each   Abdominal bracing 2 min, with ball squeeze and transverse contraction and pelvic tilt With ball squeeze and TA contractions With ball squeeze and TA contractions    Hamstring stretch [id number], B, supine 4x30 sec hold with green strap each leg 4x30 sec hold each leg with green strap    Hip abduction 2 min, green     Hip adduction   X 10 reps x 10 sec hold with yellow ball and TA contractions X 10 reps x 10 sec hold each with yellow ball & TA contractions    Calf stretch [id number], incline     Rows 1 min, blue, with transverse contraction     Single knee to chest  4x30 sec hold each leg 4x30 sec hold each each    Piriformis stretch  [id number], B 4x30 sec hold each leg 4x30 sec hold each leg    Nu step  Level 3, 12 min Level 2 x 6 mins  Level 2 x 12 mins    I T band    4x30 sec hold each LE with green band               Treatment/Session Summary:     · Response to Treatment:  Patient continues to tolerate all treatments and exercises with no complaints. Added standing spinal/core stabilization exercise today. Patient reported decreased pain post session. · Communication/Consultation:  None today  · Equipment provided today:  None today  · Recommendations/Intent for next treatment session: Next visit will focus on progression of all activities.   Treatment Plan of Care Effective Dates: 2/27/19 to 4/13/19  Total Treatment Billable Duration:  55 mins    PT Patient Time In/Time Out  Time In: 1330  Time Out: 701 Deshaun Zabala, PT

## 2019-03-11 ENCOUNTER — APPOINTMENT (OUTPATIENT)
Dept: PHYSICAL THERAPY | Age: 59
End: 2019-03-11
Payer: COMMERCIAL

## 2019-03-12 ENCOUNTER — HOSPITAL ENCOUNTER (OUTPATIENT)
Dept: PHYSICAL THERAPY | Age: 59
Discharge: HOME | End: 2019-03-12
Payer: COMMERCIAL

## 2019-03-12 ENCOUNTER — PATIENT OUTREACH (OUTPATIENT)
Dept: OTHER | Age: 59
End: 2019-03-12

## 2019-03-12 PROCEDURE — 97110 THERAPEUTIC EXERCISES: CPT

## 2019-03-12 PROCEDURE — 97140 MANUAL THERAPY 1/> REGIONS: CPT

## 2019-03-12 NOTE — PROGRESS NOTES
Steven Lepe  : 1960  Primary: [address]  Secondary:  [address]  at [address], [address]  [phone]   [phone]      OUTPATIENT PHYSICAL THERAPY: Daily Treatment Note 3/12/2019    Pre-treatment Symptoms/Complaints: Patient report moderate pain today after standing a lot earlier in the day. Patient went back to MD regarding incision healing and has been cleared for all possible infection. Patient to follow up with MD in May 2019. Pain: Initial: Pain Intensity 1: 3  Pain Location 1: Back  Pain Orientation 1: Lower  Post Session:  3/10 no change in pain but less stiff   Medications Last Reviewed:  19  Precautions: No bending, twisting, or lifting greater than 15 lbs  Date of Onset: Left L3-L4 laminectomy on 19  Updated Objective Findings:  None Today   TREATMENT:   THERAPEUTIC EXERCISE: (40 mins):  Exercises per grid below to improve mobility, strength, balance and coordination. Required minimal visual, verbal and manual cues to promote proper body alignment, promote proper body posture and promote proper body mechanics. Progressed resistance, range, repetitions and complexity of movement as indicated. MANUAL THERAPY: (15 minutes) Pt. Received soft tissue mobilization to decrease pain and muscle tightness in bilateral lumbosacral paraspinals.      Date:  3/7/19 Date:  3/12/19 Date:  3/5/19   Activity/Exercise Parameters Parameters Parameters   clamshells 2 min, B  x 10 reps each  X 10reps each    bridge 1 min 2 X 10 reps 5 sec hold each  2x10 reps 5 sec hold each   Abdominal bracing 2 min, with ball squeeze and transverse contraction and pelvic tilt With ball squeeze and TA contractions With ball squeeze and TA contractions    Hamstring stretch [id number], B, supine 4x30 sec hold with green strap each leg 4x30 sec hold each leg with green strap    Hip abduction 2 min, green Green band x 20 reps     Hip adduction   X 10 reps x 10 sec hold with yellow ball and TA contractions X 10 reps x 10 sec hold each with yellow ball & TA contractions    Calf stretch [id number], incline 4 x 30 sec hold     Rows 1 min, blue, with transverse contraction Blue band x 20 reps 5 sec hold     Single knee to chest  4x30 sec hold each leg 4x30 sec hold each each    Piriformis stretch  [id number], B 4x30 sec hold each leg 4x30 sec hold each leg    Nu step  Level 3, 12 min Level 3 x 12 mins  Level 2 x 12 mins    I T band   4x30 sec hold each LE green strap 4x30 sec hold each LE with green band               Treatment/Session Summary:     · Response to Treatment:  Pt. Was compliant with all exercises and reported less stiffness. · Communication/Consultation:  None today  · Equipment provided today:  None today  · Recommendations/Intent for next treatment session: Next visit will focus on progression of all activities.   Treatment Plan of Care Effective Dates: 2/27/19 to 4/13/19  Total Treatment Billable Duration:  55 mins    PT Patient Time In/Time Out  Time In: 0800  Time Out: 0905  Mustapha Ortega, LOAN

## 2019-03-12 NOTE — PROGRESS NOTES
MSW ECM attempted to contact patient. No answer. Discreet VM left. MSW ECM will in a week if call is not returned.

## 2019-03-12 NOTE — PROGRESS NOTES
BASIA LARA returned patient TC. Patient identifiers confirmed, zip code and . Patient updates:  Ms. Fely Noonan stated that she had PT this morning. She added she felt sore from the rigor. Ms. Fely Noonan stated that she received her funds from [employer]. She is currently working with Christina Cary for disability ([employer]) payments. The patient stated that she is in the process of transitioning from [facility] to [facility]. Ms. Fely Noonan stated that she continues to utilize [employer] which is beneficial.    The patient continues to work with her  to apply for [employer]. Ms. Fely Noonan had questions about when her health insurance would be terminated. BASIA LARA informed patient that she would need to direct that question to [employer]. Patient had questions about Medicaid and Medicare. BASIA LARA answered patient questions. Plan of action:  [facility] will seek health insurance options.

## 2019-03-14 ENCOUNTER — HOSPITAL ENCOUNTER (OUTPATIENT)
Dept: PHYSICAL THERAPY | Age: 59
Discharge: HOME | End: 2019-03-14
Payer: COMMERCIAL

## 2019-03-14 PROCEDURE — 97140 MANUAL THERAPY 1/> REGIONS: CPT

## 2019-03-14 PROCEDURE — 97110 THERAPEUTIC EXERCISES: CPT

## 2019-03-14 NOTE — PROGRESS NOTES
Forrest Lantigua  : 1960  Primary: [address]  Secondary:  [address] at [facility]  [address] [facility], [address]  [phone]   [phone]      OUTPATIENT PHYSICAL THERAPY: Daily Treatment Note 3/14/2019    Pre-treatment Symptoms/Complaints: Patient report soreness this week after doing yard work earlier this work. Pain: Initial: Pain Intensity 1: 5  Pain Location 1: Back  Pain Orientation 1: Lower  Post Session:  2/10    Medications Last Reviewed:  19  Precautions: No bending, twisting, or lifting greater than 15 lbs  Date of Onset: Left L3-L4 laminectomy on 19  Updated Objective Findings:  None Today   TREATMENT:   THERAPEUTIC EXERCISE: (40 minutes):  Exercises per grid below to improve mobility, strength, balance and coordination. Required minimal visual, verbal and manual cues to promote proper body alignment, promote proper body posture and promote proper body mechanics. Progressed resistance, range, repetitions and complexity of movement as indicated. MANUAL THERAPY: (15 minutes) Soft tissue mobilization to decrease pain and muscle tightness in bilateral lumbosacral paraspinals.      Date:  3/7/19 Date:  3/12/19 Date:  3/14/19   Activity/Exercise Parameters Parameters Parameters   clamshells 2 min, B  x 10 reps each  2 min, B   bridge 1 min 2 X 10 reps 5 sec hold each  1 min   Abdominal bracing 2 min, with ball squeeze and transverse contraction and pelvic tilt With ball squeeze and TA contractions 2 min, with ball squeeze and transverse contraction and pelvic tilt   Hamstring stretch [id number], B, supine 4x30 sec hold with green strap each leg [id number], B, supine    Hip abduction 2 min, green Green band x 20 reps  2 min, blue   Hip adduction   X 10 reps x 10 sec hold with yellow ball and TA contractions     Calf stretch [id number], incline 4 x 30 sec hold  [id number], incline   Rows 1 min, blue, with transverse contraction Blue band x 20 reps 5 sec hold  2 min, blue   Single knee to chest  4x30 sec hold each leg     Piriformis stretch  [id number], B 4x30 sec hold each leg [id number], B   Nu step  Level 3, 12 min Level 3 x 12 mins  Level 3, 12 min   I T band   4x30 sec hold each LE green strap                Treatment/Session Summary:     · Response to Treatment:  Patient tolerated all exercises with no complaints and reports good relief post session. · Communication/Consultation:  None today  · Equipment provided today:  None today  · Recommendations/Intent for next treatment session: Next visit will focus on progression of all activities.   Treatment Plan of Care Effective Dates: 2/27/19 to 4/13/19  Total Treatment Billable Duration:  55 minutes    PT Patient Time In/Time Out  Time In: 1330  Time Out: 701 Deshaun Zabala, PT

## 2019-03-18 ENCOUNTER — PATIENT OUTREACH (OUTPATIENT)
Dept: OTHER | Age: 59
End: 2019-03-18

## 2019-03-18 ENCOUNTER — HOSPITAL ENCOUNTER (OUTPATIENT)
Dept: PHYSICAL THERAPY | Age: 59
Discharge: HOME | End: 2019-03-18
Payer: COMMERCIAL

## 2019-03-18 PROCEDURE — 97110 THERAPEUTIC EXERCISES: CPT

## 2019-03-18 PROCEDURE — 97140 MANUAL THERAPY 1/> REGIONS: CPT

## 2019-03-18 NOTE — PROGRESS NOTES
CM Telephonic outreach to patient to follow up and assess progress towards goals. Patient verified her  and zip code. Patient had an office visit with her oncologist, Dr. Corry Cabrera on 3/14/19, having a medication refill. Patient has a follow up with Dr. Reg Goddard in May and continues with physical therapy. Patient's pain is in much better control. Discussed STD and LTD. Patient received a call from Nut[facility] while this care manager was on the phone. Reports that Nutrinia has been helpful in dealing with her recent grief as well. Encouraged to take the call and informed patient that this care manager will reach out again in 2-3 weeks.

## 2019-03-18 NOTE — PROGRESS NOTES
Sarah Martha  : 1960  Primary: [address]  Secondary:  [address] at [facility]  1900 [facility], [address]  [phone]   [phone]      OUTPATIENT PHYSICAL THERAPY: Daily Treatment Note 3/18/2019    Pre-treatment Symptoms/Complaints: Patient report bilateral low back pain and IT band tightnesss. Pain: Initial: Pain Intensity 1: 6  Pain Location 1: Back  Pain Orientation 1: Lower, Left, Right  Post Session:  2/10    Medications Last Reviewed:  19  Precautions: No bending, twisting, or lifting greater than 15 lbs  Date of Onset: Left L3-L4 laminectomy on 19  Updated Objective Findings:  None Today   TREATMENT:   THERAPEUTIC EXERCISE: (40 minutes):  Exercises per grid below to improve mobility, strength, balance and coordination. Required minimal visual, verbal and manual cues to promote proper body alignment, promote proper body posture and promote proper body mechanics. Progressed resistance, range, repetitions and complexity of movement as indicated. MANUAL THERAPY: (15 minutes) Soft tissue mobilization to decrease pain and muscle tightness in bilateral lumbosacral paraspinals.      Date:  3/18/19 Date:  3/12/19 Date:  3/14/19   Activity/Exercise Parameters Parameters Parameters   clamshells 2x10 reps   x 10 reps each  2 min, B   bridge 2x10 reps  2 X 10 reps 5 sec hold each  1 min   Abdominal bracing X 20 reps 5 sec hold  With ball squeeze and TA contractions 2 min, with ball squeeze and transverse contraction and pelvic tilt   Hamstring stretch 4x30 sec hold with green strap 4x30 sec hold with green strap each leg [id number], B, supine    Hip abduction Blue band x 20 reps 5 sec hold  Green band x 20 reps  2 min, blue   Hip adduction  X 10 reps x 10 sec hold with yellow with TA contractions  X 10 reps x 10 sec hold with yellow ball and TA contractions     Calf stretch 4x30 sec hold on incline 4 x 30 sec hold  [id number], incline Rows Blue band 2x10 reps 5 sec  Hold each Blue band x 20 reps 5 sec hold  2 min, blue   Single knee to chest 4x30 sec hold each leg strap  4x30 sec hold each leg     Piriformis stretch  4x30 sec hold bilateral LE's  4x30 sec hold each leg [id number], B   Nu step  Level 3, 12 min Level 3 x 12 mins  Level 3, 12 min   I T band  4x30 sec hold each with green strap 4x30 sec hold each LE green strap                Treatment/Session Summary:     · Response to Treatment:  Pt. Was compliant with all exercises with minimal back discomfort. · Communication/Consultation:  None today  · Equipment provided today:  None today  · Recommendations/Intent for next treatment session: Next visit will focus on progression of all activities.   Treatment Plan of Care Effective Dates: 2/27/19 to 4/13/19  Total Treatment Billable Duration:  60  minutes    PT Patient Time In/Time Out  Time In: 1100  Time Out: [address] [facility]

## 2019-03-19 NOTE — ACP (ADVANCE CARE PLANNING)
BASIA LARA contacted patient for follow-up. Patient identifiers were confirmed, zip code and . Patient updates  Ms. De stated that she continues to utilize [facility] for counseling services. They have been helpful. She added that according to HR, she will be able to keep her existing health insurance plan until 2019. BASIA LARA suggested patient contact [employer] ([phone])  for a health insurance quote for when her current policy expires. Patient was agreeable. BASIA [facility] will email patient the contact information per her request.    Ms. Mandy Gardiner stated that she will continue to receive STD until approximately 2019 until she is approved for LTD. The patient stated that is continues to receive support from her family and [employer] members.

## 2019-03-21 ENCOUNTER — HOSPITAL ENCOUNTER (OUTPATIENT)
Dept: PHYSICAL THERAPY | Age: 59
Discharge: HOME | End: 2019-03-21
Payer: COMMERCIAL

## 2019-03-21 PROCEDURE — 97140 MANUAL THERAPY 1/> REGIONS: CPT

## 2019-03-21 PROCEDURE — 97110 THERAPEUTIC EXERCISES: CPT

## 2019-03-21 NOTE — PROGRESS NOTES
Ana Laura Almonte : 1960 [facility] Secondary:  Therapy Center at [address], [address] [phone]   [phone] OUTPATIENT PHYSICAL THERAPY: Daily Treatment Note 3/21/2019 Pre-treatment Symptoms/Complaints: Patient reports minor pain today but moderate pain in low back yesterday. Patient reports overall improvement since starting therapy. Pain: Initial: Pain Intensity 1: 2 Pain Location 1: Back Pain Orientation 1: Lower  Post Session:  2/10 Medications Last Reviewed:  19 Precautions: No bending, twisting, or lifting greater than 15 lbs Date of Onset: Left L3-L4 laminectomy on 19 Updated Objective Findings:  See progress note TREATMENT:  
THERAPEUTIC EXERCISE: (44 minutes):  Exercises per grid below to improve mobility, strength, balance and coordination. Required minimal visual, verbal and manual cues to promote proper body alignment, promote proper body posture and promote proper body mechanics. Progressed resistance, range, repetitions and complexity of movement as indicated. MANUAL THERAPY: (10 minutes) Soft tissue mobilization to decrease pain and muscle tightness in bilateral lumbosacral paraspinals. Date: 
3/18/19 Date: 
3/21/19 Date: 
3/14/19 Activity/Exercise Parameters Parameters Parameters  
clamshells 2x10 reps  2 min, B 2 min, B  
bridge 2x10 reps  2 min 1 min Abdominal bracing X 20 reps 5 sec hold  2 min, with ball squeeze and transverse contraction and pelvic tilt 2 min, with ball squeeze and transverse contraction and pelvic tilt Hamstring stretch 4x30 sec hold with green strap 2x30 sec hold with green strap each leg [id number], B, supine Hip abduction Blue band x 20 reps 5 sec hold  2 min, blue 2 min, blue Hip adduction  X 10 reps x 10 sec hold with yellow with TA contractions Hip abduction  1x10, B, standing Step ups  1x10, B, 6 in   
 Calf stretch 4x30 sec hold on incline  [id number], incline Rows Blue band 2x10 reps 5 sec  Hold each 1 min x 2, blue 2 min, blue Single knee to chest 4x30 sec hold each leg strap Piriformis stretch  4x30 sec hold bilateral LE's   [id number], B Nu step  Level 3, 12 min Level 3, 10 min Level 3, 12 min I T band  4x30 sec hold each with green strap Treatment/Session Summary: · Response to Treatment:  Patient continues to tolerate all treatments and exercises with no complaints. Patient reports continued weakness and left hip and lower extremity. Added step ups to program. 
· Communication/Consultation:  None today · Equipment provided today:  None today · Recommendations/Intent for next treatment session: Next visit will focus on progression of all activities. Treatment Plan of Care Effective Dates: 2/27/19 to 4/13/19 Total Treatment Billable Duration:  54  minutes PT Patient Time In/Time Out Time In: 1230 Time Out: 1325 Cher Martines, PT

## 2019-03-21 NOTE — PROGRESS NOTES
Abhijit Carlos : 1960 Primary: [facility]* Secondary:  Therapy Center at [address], [facility], [address] [phone]   [phone] OUTPATIENT PHYSICAL THERAPY:Progress Report 3/21/2019 ICD-10: Treatment Diagnosis: Radiculopathy, lumbar (M54.16) Treatment diagnosis 2: Low back pain (M54.5) Treatment diagnosis 3: Pain in left leg (M79.605) Precautions: Cervical fusion, Right knee replacement. No bending, twisting, or lifting greater than 15 lbs Allergies: Latex, natural rubber; Latex; Contrast agent [iodine]; Hydrocodone-acetaminophen; Meperidine; and Other medication MD Orders: evaluate and treat  MEDICAL/REFERRING DIAGNOSIS: 
Radiculopathy, lumbar region [M54.16] DATE OF ONSET: Left L3-L4 laminectomy on 19 REFERRING PHYSICIAN: Iantha Habermann, MD 
RETURN PHYSICIAN APPOINTMENT: May 2019 INITIAL ASSESSMENT:  Ms. Pollo Art is a 63 y.o. female presenting to physical therapy s/p left L3-L4 laminectomy, foraminotomy, facetectomy, and diskectomy on 19. Patient reports chronic and progressive pain in lumbar spine and left lower extremity prior to surgery. Patient now reports decreased pain in low back and left leg (80% improvement) since having surgery but continues to report increased pain with prolonged standing and activities, and weakness/instability in left leg. At worst, patient reports 6/10 pain in lumbar spine, and 5/10 pain in left leg. Patient reports that MD states that he was unable to completely \"fix\" issues in spine and recommends that she apply for disability. 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 Patient reports of history of cervical fusion, right knee replacement, and leukemia. PROGRESS NOTE: Patient has been seen for 8 sessions from 19 to 3/21/19. Patient is making good progress towards goals at this time. Patient has met goal for walking and has improve outcome measure for low back. Patient reports decreased pain and improved function since starting therapy. Patient continues to report increased pain with prolonged standing and walking, but reports that static standing is more painful than walking. Patient is a good candidate for skilled physical therapy services to include manual therapy, therapeutic exercise, and pain modalities as needed. PROBLEM LIST (Impacting functional limitations): 1. Decreased Strength 2. Decreased ADL/Functional Activities 3. Decreased Ambulation Ability/Technique 4. Decreased Balance 5. Increased Pain 6. Decreased Activity Tolerance 7. Increased Fatigue 8. Decreased Flexibility/Joint Mobility INTERVENTIONS PLANNED: 
1. Balance Exercise 2. Bed Mobility 3. Cold 4. Cryotherapy 5. Electrical Stimulation 6. Gait Training 7. Heat 8. Home Exercise Program (HEP) 9. Manual Therapy 10. Neuromuscular Re-education/Strengthening 11. Range of Motion (ROM) 12. Therapeutic Activites 13. Therapeutic Exercise/Strengthening 14. Transcutaneous Electrical Nerve Stimulation (TENS) 15. Ultrasound (US)  
TREATMENT PLAN: 
Effective Dates: 2/27/2019 TO 4/13/2019 (45 days). Frequency/Duration: 2 times a week for 45 Days GOALS: (Goals have been discussed and agreed upon with patient.) Short-Term Functional Goals: Time Frame: 2/27/19 to 3/13/19 1. Patient will be independent with home program to increase hip stability and core muscle activation. -met 2. Patient will report no more than 5/10 pain in low back with ADL's.-met Discharge Goals: Time Frame: 2/27/19 to 4/13/19 1. Patient will report no more than 3/10 pain in low back with prolonged standing and walking.-ongoing 2. Patient will report no more than 2/10 pain in left leg with prolonged standing and walking.-met 3. Patient will report performing vacuuming and sweeping at home with no more than 2/10 pain.-ongoing 4. Patient will perform all active lumbar motions with 0/10 pain. -ongoing 5. Patient will report walking for greater than 30 minutes with no increase in pain.-met 6. Patient will perform single leg balance greater than 10 seconds bilaterally.-ongoing 7. Patient will improve Modified Oswestry score to 13/50 from 27/50.-ongoing Outcome Measure: Tool Used: Modified Oswestry Low Back Pain Questionnaire Score:  Initial: 27/50  Most Recent:  
19/50 (Date: 3/21/19 ) Interpretation of Score: Each section is scored on a 0-5 scale, 5 representing the greatest disability. The scores of each section are added together for a total score of 50. ROM:   
 
Lumbar AROM not tested due to restrictions. Hip flexion: 100 degrees bilaterally Bilateral knees 0-125 degrees. Moderate to marked tightness in bilateral hamstrings. Strength:   
       
Gross strength in right leg is 5/5 Gross strength in left leg is 4 to 4+/5 Hip flexion: left=4/5, right=4+/5 Hip abduction: left=4-/5, right=4/5 Balance:   
      Single leg stance: left=5-10 seconds, right=greater than 10 seconds 
 
  
Medical Necessity:  
· Patient is expected to demonstrate progress in strength, range of motion, balance, coordination and functional technique to decrease pain, increase hip and core strength, and increase tolerance for weight bearing and ADL's. · Skilled intervention continues to be required due to pain and limitation in low back and left lower extremity. Reason for Services/Other Comments: 
· Patient continues to require skilled intervention due to pain and limitation in low back and left lower extremity. Rehabilitation Potential For Stated Goals: Good Regarding Fina Martins's therapy, I certify that the treatment plan above will be carried out by a therapist or under their direction.  
Thank you for this referral, 
Kaushal Parra, PT    
 Referring Physician Signature: Christie Verdugo MD              Date

## 2019-03-25 ENCOUNTER — HOSPITAL ENCOUNTER (OUTPATIENT)
Dept: PHYSICAL THERAPY | Age: 59
Discharge: HOME | End: 2019-03-25
Payer: COMMERCIAL

## 2019-03-25 PROCEDURE — 97110 THERAPEUTIC EXERCISES: CPT

## 2019-03-25 PROCEDURE — 97140 MANUAL THERAPY 1/> REGIONS: CPT

## 2019-03-25 NOTE — PROGRESS NOTES
Thu Centeno : 1960 Primary: [facility]* Secondary:  Therapy Center at [address], [address] [phone]   [phone] OUTPATIENT PHYSICAL THERAPY: Daily Treatment Note 3/25/2019 Pre-treatment Symptoms/Complaints: Patient reports minimal pain today but reported severe pain yesterday like 8/10. Pt. Thought it could be from performing step ups on 6 inch step last session. Pain: Initial: Pain Intensity 1: 2 Pain Location 1: Back Pain Orientation 1: Lower, Left, Right  Post Session:  2/10 Medications Last Reviewed:  19 Precautions: No bending, twisting, or lifting greater than 15 lbs Date of Onset: Left L3-L4 laminectomy on 19 Updated Objective Findings:  See progress note TREATMENT:  
THERAPEUTIC EXERCISE: ( 45 minutes):  Exercises per grid below to improve mobility, strength, balance and coordination. Required minimal visual, verbal and manual cues to promote proper body alignment, promote proper body posture and promote proper body mechanics. Progressed resistance, range, repetitions and complexity of movement as indicated. MANUAL THERAPY: ( 15 minutes) Soft tissue mobilization to decrease pain and muscle tightness in bilateral lumbosacral paraspinals. Date: 
3/18/19 Date: 
3/21/19 Date: 
3/25/19 Activity/Exercise Parameters Parameters Parameters  
clamshells 2x10 reps  2 min, B 2x10 reps BLE's  
bridge 2x10 reps  2 min x20 reps 5 sec hold each Abdominal bracing X 20 reps 5 sec hold  2 min, with ball squeeze and transverse contraction and pelvic tilt 2 min, with ball squeeze and transverse contraction and pelvic tilt Hamstring stretch 4x30 sec hold with green strap 2x30 sec hold with green strap each leg 4x30 sec hold strap supine BL Hip abduction Blue band x 20 reps 5 sec hold  2 min, blue Standing 2x10 reps 2lb wt. s Hip adduction  X 10 reps x 10 sec hold with yellow with TA contractions    x 10 reps x 10 sec hold each yellow Hip abduction  1x10, B, standing 2x10 reps 2 lb wt. s Step ups  1x10, B, 6 in Hold today-------- Calf stretch 4x30 sec hold on incline  4x30 sec 
hold incline Rows Blue band 2x10 reps 5 sec  Hold each 1 min x 2, blue 2 min, blue Single knee to chest 4x30 sec hold each leg strap    4 x 30 sec hold each Piriformis stretch  4x30 sec hold bilateral LE's   [id number], B Nu step  Level 3, 12 min Level 3, 10 min Level 4 x 12 min I T band  4x30 sec hold each with green strap  4x30 sec hold each LE green strap Treatment/Session Summary: · Response to Treatment:  Patient reported no change in pain level and had no complaints of pain during exercises. · Communication/Consultation:  None today · Equipment provided today:  None today · Recommendations/Intent for next treatment session: Next visit will focus on progression of all activities. Treatment Plan of Care Effective Dates: 2/27/19 to 4/13/19 Total Treatment Billable Duration:  60  minutes PT Patient Time In/Time Out Time In: 1100 Time Out: 1200 Keri Carvajal PTA

## 2019-03-26 ENCOUNTER — PATIENT OUTREACH (OUTPATIENT)
Dept: OTHER | Age: 59
End: 2019-03-26

## 2019-03-26 NOTE — PROGRESS NOTES
BASIA LARA received TC from patient. Patient identifiers confirmed, zip code and . Patient updates Ms. Benigno Griffith stated that a few health insurance brokers called her to provide rates. She stated that once she disclosed her medical conditions, they suggested she contact the [employer]. BASIA LARA informed patient that the benefits of the [employer] is that the costs are based on your income and medical history is not factored into the rate. Patient is agreeable with receiving a rate from the [employer]. BASIA LARA provided patient verbally and in email the rates of the best plans for her financial situation. BASIA LARA will call patient in a few days to answer any questions that she may have.

## 2019-03-26 NOTE — PROGRESS NOTES
BASIA LARA contacted patient for follow-up. Patient identifiers confirmed, zip code and . Patient updates Ms. Cl Nascimento stated the attempted to contact [employer] to receive a medical  insurance quote. She added that she was on the telephone for more than 20 minutes waiting for someone to answer the call. No success. Ms. Cl Nascimento stated that she called additional medical insurance companies but was simply asked to leave her contact information and an agent would call her back. She hasn't received any feedback this far. Plan of action BASIA LARA will keep alternative methods to assist patient with obtaining medical insurance rates.

## 2019-03-28 ENCOUNTER — PATIENT OUTREACH (OUTPATIENT)
Dept: OTHER | Age: 59
End: 2019-03-28

## 2019-03-28 ENCOUNTER — HOSPITAL ENCOUNTER (OUTPATIENT)
Dept: PHYSICAL THERAPY | Age: 59
Discharge: HOME | End: 2019-03-28
Payer: COMMERCIAL

## 2019-03-28 PROCEDURE — 97110 THERAPEUTIC EXERCISES: CPT

## 2019-03-28 PROCEDURE — 97140 MANUAL THERAPY 1/> REGIONS: CPT

## 2019-03-28 NOTE — PROGRESS NOTES
Amol Bhatt : 1960 Primary: [facility] Secondary:  [facility]rapy Center at [address], [address] [phone]   [phone] OUTPATIENT PHYSICAL THERAPY: Daily Treatment Note 3/28/2019 Pre-treatment Symptoms/Complaints: Patient reports minor pain today and decreased pain following her bout of pain last week. Pain: Initial: Pain Intensity 1: 2 Pain Location 1: Back Pain Orientation 1: Lower, Right  Post Session:  2/10 Medications Last Reviewed:  19 Precautions: No bending, twisting, or lifting greater than 15 lbs Date of Onset: Left L3-L4 laminectomy on 19 Updated Objective Findings:  None Today TREATMENT:  
THERAPEUTIC EXERCISE: ( 40 minutes):  Exercises per grid below to improve mobility, strength, balance and coordination. Required minimal visual, verbal and manual cues to promote proper body alignment, promote proper body posture and promote proper body mechanics. Progressed resistance, range, repetitions and complexity of movement as indicated. MANUAL THERAPY: ( 14 minutes) Soft tissue mobilization to decrease pain and muscle tightness in bilateral lumbosacral paraspinals. Date: 
3/28/19 Date: 
3/21/19 Date: 
3/25/19 Activity/Exercise Parameters Parameters Parameters  
clamshells 2 min, B 2 min, B 2x10 reps BLE's  
bridge 2 min 2 min x20 reps 5 sec hold each Abdominal bracing 2 min, ball squeeze with pelvic tilt 2 min, with ball squeeze and transverse contraction and pelvic tilt 2 min, with ball squeeze and transverse contraction and pelvic tilt Hamstring stretch 4x30 sec hold with green strap 2x30 sec hold with green strap each leg 4x30 sec hold strap supine BL Hip abduction 2 min, Blue 2 min, blue Standing 2x10 reps 2lb wt.s Hip adduction     x 10 reps x 10 sec hold each yellow Hip abduction  1x10, B, standing 2x10 reps 2 lb wt. s Step ups  1x10, B, 6 in Hold today-------- Calf stretch 2x30 sec hold on incline  4x30 sec 
hold incline Rows 1 min x 2, blue 1 min x 2, blue 2 min, blue Single knee to chest    4 x 30 sec hold each Piriformis stretch  [id number], B  [id number], B Nu step  Level 3.5, 11 min Level 3, 10 min Level 4 x 12 min I T band    4x30 sec hold each LE green strap Treatment/Session Summary: · Response to Treatment:  Patient continues to tolerate all exercises and reports no increase in pain post session. Did not perform step ups today. · Communication/Consultation:  None today · Equipment provided today:  None today · Recommendations/Intent for next treatment session: Next visit will focus on progression of all activities. Treatment Plan of Care Effective Dates: 2/27/19 to 4/13/19 Total Treatment Billable Duration:  54  minutes PT Patient Time In/Time Out Time In: 1000 Time Out: 1055 Martha Christianson, PT

## 2019-03-28 NOTE — PROGRESS NOTES
BASIA LARA returned patient TC. Patient identifiers confirmed, zip code and . Patient updates Ms. Verona French stated that she reviewed the email that the [facility] sent her regarding the medical insurance rates. Patient stated that the plans the [facility] forwarded her were good fits and affordable. She confirmed that the plans would accept her current health care providers and cover her medications. Ms. Verona French requested feedback regarding when she should initiate insurance through the [employer]. BASIA LARA suggested patient contact call them a week prior to her [employer] coverage ending. Patient was in agreement and was provided the telephone number.

## 2019-03-28 NOTE — PROGRESS NOTES
BASIA LARA contacted patient for follow-up. Patient identifiers confirmed, zip code and . Patient updates Ms. Justo George stated that she talked to [employer] about LTD. Patient confirmed that she is approved to receive STD until 19. The LTD policy determination is pending. Reportedly patient was told that if she was approved for LTD, her payment would continue until she received Social Security Disability benefits or death. Ms. Justo George stated that she had not thoroughly reviewed the email that [facility] send her with medical insurance rates. She stated that she would review the information today and contact BASIA LARA if she had questions. Plan of action BASIA LARA will follow up with patient in 2 weeks.

## 2019-04-01 ENCOUNTER — HOSPITAL ENCOUNTER (OUTPATIENT)
Dept: PHYSICAL THERAPY | Age: 59
Discharge: HOME | End: 2019-04-01
Payer: COMMERCIAL

## 2019-04-01 PROCEDURE — 97110 THERAPEUTIC EXERCISES: CPT

## 2019-04-01 NOTE — PROGRESS NOTES
Cliff Juarez  : 1960  Primary: [address]  Secondary:  [address] at [facility]  [address] [facility], [address] [place] [address] Street  [phone]   [phone]      OUTPATIENT PHYSICAL THERAPY: Daily Treatment Note 2019    Pre-treatment Symptoms/Complaints: Patient reported not sleeping well due to low back pain and colder weather. Pain: Initial: Pain Intensity 1: 3  Pain Location 1: Back  Pain Orientation 1: Lower, Right  Post Session:  0/10 no pain   Medications Last Reviewed:  19  Precautions: No bending, twisting, or lifting greater than 15 lbs  Date of Onset: Left L3-L4 laminectomy on 19  Updated Objective Findings:  Pt. had no pain or issues performing exercises today. TREATMENT:   THERAPEUTIC EXERCISE: ( 55 minutes):  Exercises per grid below to improve mobility, strength, balance and coordination. Required minimal visual, verbal and manual cues to promote proper body alignment, promote proper body posture and promote proper body mechanics. Progressed resistance, range, repetitions and complexity of movement as indicated. MANUAL THERAPY:  None today pt. Declined      Date:  19 Date:  3/21/19 Date:  3/25/19   Activity/Exercise Parameters Parameters Parameters   clamshells X 20 reps 5 sec hold  2 min, B 2x10 reps BLE's   bridge X 20 reps 5 sec hold 2 min x20 reps 5 sec hold each    Abdominal bracing 2 min, ball squeeze with pelvic tilt 2 min, with ball squeeze and transverse contraction and pelvic tilt 2 min, with ball squeeze and transverse contraction and pelvic tilt   Hamstring stretch 4x30 sec hold with green strap 2x30 sec hold with green strap each leg 4x30 sec hold strap supine BL   Hip abduction Standing 2x10 reps 2 lb wt.s  2 min, blue Standing 2x10 reps 2lb wt.s    Hip adduction  Yellow ball x 20 reps 5 sec hold    x 10 reps x 10 sec hold each yellow    Hip abduction 2 lb wt.s 2x10 reps  1x10, B, standing 2x10 reps 2 lb wt. s Step ups X 10 rep 6 inch fwd & lateral   Hold today--------   Calf stretch 4x30 sec hold on incline  4x30 sec  hold incline   Rows X 2 min  Blue band  1 min x 2, blue 2 min, blue   Single knee to chest 4x30 sec hold    4 x 30 sec hold each    Piriformis stretch  4x30 BLE's   [id number], B   Nu step  Level 5, 12 mins Level 3, 10 min Level 4 x 12 min   I T band  4x30 sec hold with green strap   4x30 sec hold each LE green strap                Treatment/Session Summary:     · Response to Treatment:  Patient was compliant with all exercises without any complaints of pain today. · Communication/Consultation:  None today  · Equipment provided today:  None today  · Recommendations/Intent for next treatment session: Next visit will focus on progression of all activities.   Treatment Plan of Care Effective Dates: 2/27/19 to 4/13/19  Total Treatment Billable Duration:  55  minutes    PT Patient Time In/Time Out  Time In: 1100  Time Out: 3200 [facility]

## 2019-04-04 ENCOUNTER — APPOINTMENT (OUTPATIENT)
Dept: PHYSICAL THERAPY | Age: 59
End: 2019-04-04
Payer: COMMERCIAL

## 2019-04-05 ENCOUNTER — HOSPITAL ENCOUNTER (OUTPATIENT)
Dept: PHYSICAL THERAPY | Age: 59
Discharge: HOME | End: 2019-04-05
Payer: COMMERCIAL

## 2019-04-05 PROCEDURE — 97110 THERAPEUTIC EXERCISES: CPT

## 2019-04-05 NOTE — PROGRESS NOTES
Cari Arce  : 1960  Primary: [address]  Secondary:  [address] at [address]  [address], Jeff lantigua, [address] [place] [address] Street  [phone]   [phone]      OUTPATIENT PHYSICAL THERAPY: Daily Treatment Note 2019    Pre-treatment Symptoms/Complaints: Patient reports mild pain this morning with driving yesterday and changes in weather. Pain: Initial: Pain Intensity 1: 3  Pain Location 1: Back  Pain Orientation 1: Lower  Post Session:  0/10 no pain   Medications Last Reviewed:  19  Precautions: No bending, twisting, or lifting greater than 15 lbs  Date of Onset: Left L3-L4 laminectomy on 19  Updated Objective Findings:  Added sit to stand exercise to program   TREATMENT:   THERAPEUTIC EXERCISE: ( 54 minutes):  Exercises per grid below to improve mobility, strength, balance and coordination. Required minimal visual, verbal and manual cues to promote proper body alignment, promote proper body posture and promote proper body mechanics. Progressed resistance, range, repetitions and complexity of movement as indicated. MANUAL THERAPY:  None     Date:  19 Date:  19 Date:  3/25/19   Activity/Exercise Parameters Parameters Parameters   clamshells X 20 reps 5 sec hold  2 min, B 2x10 reps BLE's   bridge X 20 reps 5 sec hold 2 min, with ABD, blue x20 reps 5 sec hold each    Abdominal bracing 2 min, ball squeeze with pelvic tilt 2 min, with ball squeeze and pelvic tilt 2 min, with ball squeeze and transverse contraction and pelvic tilt   Hamstring stretch 4x30 sec hold with green strap 2x30 sec, B 4x30 sec hold strap supine BL   Hip abduction Standing 2x10 reps 2 lb wt. s   Standing 2x10 reps 2lb wt.s    Hip adduction  Yellow ball x 20 reps 5 sec hold    x 10 reps x 10 sec hold each yellow    Hip abduction 2 lb wt.s 2x10 reps  2x10, B, standing 2x10 reps 2 lb wt. s    Heel raise  2x10    Sit to stand  2x10, chair with airex          Step ups X 10 rep 6 inch fwd & lateral  1x10, B, 6 in Hold today--------   Calf stretch 4x30 sec hold on incline [id number], incline 4x30 sec  hold incline   Rows X 2 min  Blue band  1 min x 2, blue 2 min, blue   Single knee to chest 4x30 sec hold    4 x 30 sec hold each    Piriformis stretch  4x30 BLE's  [id number], B [id number], B   Nu step  Level 5, 12 mins Level 5, 10 min Level 4 x 12 min   I T band  4x30 sec hold with green strap  [id number], B 4x30 sec hold each LE green strap                Treatment/Session Summary:     · Response to Treatment:  Patient continues to tolerate and progress all exercises with no complaints. Patient showing improved tolerance for overall exercise and activities. · Communication/Consultation:  None today  · Equipment provided today:  None today  · Recommendations/Intent for next treatment session: Next visit will focus on progression of all activities.   Treatment Plan of Care Effective Dates: 2/27/19 to 4/13/19  Total Treatment Billable Duration:  54  minutes    PT Patient Time In/Time Out  Time In: 0800  Time Out: 0855  Lavelle Kelly PT

## 2019-04-08 ENCOUNTER — PATIENT OUTREACH (OUTPATIENT)
Dept: OTHER | Age: 59
End: 2019-04-08

## 2019-04-08 ENCOUNTER — HOSPITAL ENCOUNTER (OUTPATIENT)
Dept: PHYSICAL THERAPY | Age: 59
Discharge: HOME | End: 2019-04-08
Payer: COMMERCIAL

## 2019-04-08 PROCEDURE — 97110 THERAPEUTIC EXERCISES: CPT

## 2019-04-08 NOTE — PROGRESS NOTES
Amol Bhatt  : 1960  Primary: [address]  Secondary:  [address] at [address], [address]  [phone]   [phone]      OUTPATIENT PHYSICAL THERAPY: Daily Treatment Note 2019    Pre-treatment Symptoms/Complaints: Patient reported no pain just tightness in her low back. g    Pain: Initial: Pain Intensity 1: 0  Pain Location 1: Back  Pain Orientation 1: Lower  Post Session:  0/10 no pain   Medications Last Reviewed:  19  Precautions: No bending, twisting, or lifting greater than 15 lbs  Date of Onset: Left L3-L4 laminectomy on 19  Updated Objective Findings:  added 2 lb wt. s to  standing exercises   TREATMENT:   THERAPEUTIC EXERCISE: ( 45 minutes):  Exercises per grid below to improve mobility, strength, balance and coordination. Required minimal visual, verbal and manual cues to promote proper body alignment, promote proper body posture and promote proper body mechanics. Progressed resistance, range, repetitions and complexity of movement as indicated. MANUAL THERAPY:  (x 10 mins)      Date:  19 Date:  19 Date:  19   Activity/Exercise Parameters Parameters Parameters   clamshells X 20 reps 5 sec hold  2 min, B 2x10 reps BLE's with yellow ball   bridge X 20 reps 5 sec hold 2 min, with ABD, blue x20 reps 5 sec hold each    Abdominal bracing 2 min, ball squeeze with pelvic tilt 2 min, with ball squeeze and pelvic tilt 2 min, with ball squeeze and transverse contraction and pelvic tilt   Hamstring stretch 4x30 sec hold with green strap 2x30 sec, B 4x30 sec hold strap supine BL   Hip abduction Standing 2x10 reps 2 lb wt. s   Standing 2x10 reps 2.0  lb wt.s    Hip adduction  Yellow ball x 20 reps 5 sec hold    x 10 reps x 10 sec hold each yellow ball    Hip abduction 2 lb wt.s 2x10 reps  2x10, B, standing Supine 2x10 reps blue band   Heel raise  2x10 2x10 reps    Sit to stand  2x10, chair with airex 2x10 reps airex         Standing hamstring curls   2lb wt. s x 20 reps    Standing hip flexion   2 lb wt. s x 20 reps    Step ups X 10 rep 6 inch fwd & lateral  1x10, B, 6 in 6 inch fwd & lateral 2x10    Calf stretch 4x30 sec hold on incline [id number], incline 4x30 sec  hold incline   Rows X 2 min  Blue band  1 min x 2, blue 3x10 reps blue band   Single knee to chest 4x30 sec hold    4 x 30 sec hold each    Piriformis stretch  4x30 BLE's  [id number], B [id number], B   Nu step  Level 5, 12 mins Level 5, 10 min Level 5 x 10 min   I T band  4x30 sec hold with green strap  [id number], B 4x30 sec hold each LE green strap                Treatment/Session Summary:     · Response to Treatment:  Patient was compliant with all exercises without any issues or complaints. · Communication/Consultation:  None today  · Equipment provided today:  None today  · Recommendations/Intent for next treatment session: Next visit will focus on progression of all activities.   Treatment Plan of Care Effective Dates: 2/27/19 to 4/13/19  Total Treatment Billable Duration:  55  minutes    PT Patient Time In/Time Out  Time In: 1000  Time Out: 1201 [facility]

## 2019-04-08 NOTE — PROGRESS NOTES
[facility] Telephonic outreach to follow up with patient to assess her progress. Patient verified her  and zip code. Patient reports that she had her physical therapy appointment this morning and continues to work towards getting the information needed to be approved for LTD. Patient started seeing a pain management physician, Dr. Roberto Price with [facility]. Dr. Roberto Price has prescribed Tramadol for pain, patient taking this in the evening to assist with sleep. Patient is concerned regarding her future, while waiting to hear whether she will qualify for LTD, and how she will support herself until is is approved. Patient continues to express pain, especially while on her feet most of the day. Remarks that the weather changes are impacting her pain levels, but she is feeling a little better overall, however not working and not on her feet. Patient is no longer working with Kayla Duke, having graduated the program. Reports that the program was very helpful and she is feeling much less depressed, focused on the fact that when she was referred to Kayla Duke, she was having situational depression and grieving. Patient no longer feels that need for therapy to address grieve and emotions, this CM encouraged the patient to request this CM's assistance for an outpatient therapist in the future. Follow ups: 
Patient has a follow up appointment with Dr. Roberto Price on  and Dr. Vi Mao on . This care manager will reach out to patient again in 3 weeks to assess current status and progress.

## 2019-04-11 ENCOUNTER — HOSPITAL ENCOUNTER (OUTPATIENT)
Dept: PHYSICAL THERAPY | Age: 59
Discharge: HOME | End: 2019-04-11
Payer: COMMERCIAL

## 2019-04-11 PROCEDURE — 97110 THERAPEUTIC EXERCISES: CPT

## 2019-04-11 NOTE — PROGRESS NOTES
Amol Bhatt  : 1960  Primary: [address] [facility]  Secondary:  [address] at [facility]  [address] [facility] [address], [place] l[place], [address] [place] [address]  [phone]   [phone]       OUTPATIENT PHYSICAL [facility] 19    ICD-10: Treatment Diagnosis: Radiculopathy, lumbar (M54.16)  Treatment diagnosis 2: Low back pain (M54.5)  Treatment diagnosis 3: Pain in left leg (M79.605)  Precautions: Cervical fusion, Right knee replacement. No bending, twisting, or lifting greater than 15 lbs  Allergies: Latex, natural rubber; Latex; Contrast agent [iodine]; Hydrocodone-acetaminophen; Meperidine; and Other medication   MD Orders: evaluate and treat  MEDICAL/REFERRING DIAGNOSIS:  Radiculopathy, lumbar region [M54.16]   DATE OF ONSET: Left L3-L4 laminectomy on 19  REFERRING PHYSICIAN: Shelly Stock MD  RETURN PHYSICIAN APPOINTMENT: 19     INITIAL ASSESSMENT:  Ms. Jose Gil is a 63 y.o. female presenting to physical therapy s/p left L3-L4 laminectomy, foraminotomy, facetectomy, and diskectomy on 19. Patient reports chronic and progressive pain in lumbar spine and left lower extremity prior to surgery. Patient now reports decreased pain in low back and left leg (80% improvement) since having surgery but continues to report increased pain with prolonged standing and activities, and weakness/instability in left leg. At worst, patient reports 6/10 pain in lumbar spine, and 5/10 pain in left leg. Patient reports that MD states that he was unable to completely \"fix\" issues in spine and recommends that she apply for disability. 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 Patient reports of history of cervical fusion, right knee replacement, and leukemia. PROGRESS NOTE: Patient has been seen for 13 sessions from 19 to 19. Patient has met goal for ambulation, balance,  and pain reduction in left leg. Patient partially met goal for pain reduction with house chores but continues to report increased pain with sweeping. Patient did not meet goal for low back function and overall pain reduction. Patient continues to report increased pain in lumbar spine and bilateral hips with prolonged standing and walking, and during household chores and ADL's. Pain increases to 8/10 at times in low back. Patient reports increased pain with active lumbar side bending to the left and lumbar rotation movements. Patient reports good improvement with pain and overall function since starting therapy, however. Patient to continue with home program.  Patient did not return for continued therapy after follow up with MD on 4/16/19 and will be discharged at this time. PROBLEM LIST (Impacting functional limitations):  1. Decreased Strength  2. Decreased ADL/Functional Activities  3. Decreased Ambulation Ability/Technique  4. Decreased Balance  5. Increased Pain  6. Decreased Activity Tolerance  7. Increased Fatigue  8. Decreased Flexibility/Joint Mobility INTERVENTIONS PLANNED:  1. Balance Exercise  2. Bed Mobility  3. Cold  4. Cryotherapy  5. Electrical Stimulation  6. Gait Training  7. Heat  8. Home Exercise Program (HEP)  9. Manual Therapy  10. Neuromuscular Re-education/Strengthening  11. Range of Motion (ROM)  12. Therapeutic Activites  13. Therapeutic Exercise/Strengthening  14. Transcutaneous Electrical Nerve Stimulation (TENS)  15. Ultrasound (US)   TREATMENT PLAN:  Effective Dates: 2/27/2019 TO 4/13/2019 (45 days). Frequency/Duration: Patient to be discharged. GOALS: (Goals have been discussed and agreed upon with patient.)  Short-Term Functional Goals: Time Frame: 2/27/19 to 3/13/19  1. Patient will be independent with home program to increase hip stability and core muscle activation. -met  2.  Patient will report no more than 5/10 pain in low back with ADL's.-not met  Discharge Goals: Time Frame: 2/27/19 to 4/13/19  1. Patient will report no more than 3/10 pain in low back with prolonged standing and walking.-not met  2. Patient will report no more than 2/10 pain in left leg with prolonged standing and walking.-met  3. Patient will report performing vacuuming and sweeping at home with no more than 2/10 pain. - partially met  4. Patient will perform all active lumbar motions with 0/10 pain. -not met  5. Patient will report walking for greater than 30 minutes with no increase in pain.-met  6. Patient will perform single leg balance greater than 10 seconds bilaterally.-met  7. Patient will improve Modified Oswestry score to 13/50 from 27/50. -not met    Outcome Measure: Tool Used: Modified Oswestry Low Back Pain Questionnaire  Score:  Initial: 27/50  Most Recent:   22/50 (Date: 4/11/19)  19/50 (Date: 3/21/19 )   Interpretation of Score: Each section is scored on a 0-5 scale, 5 representing the greatest disability. The scores of each section are added together for a total score of 50. ROM:      Lumbar AROM:  Flexion=28cm from toes, tightness in back and legs  Extension=50%  Side bending: right=normal, left=50% with pain in lumbar  Rotation: 75% bilaterally, pain in lumbar    Hip flexion: 100 degrees bilaterally  Bilateral knees 0-125 degrees. Moderate to marked tightness in bilateral hamstrings. Strength:            Gross strength in right leg is 5/5  Gross strength in left leg is  4+ to 5/5  Hip flexion: left=4/5, right=4+/5  Hip abduction: left=4-/5, right=4/5    Balance:          Single leg stance: greater than 10 seconds bilaterally.                 Rehabilitation Potential For Stated Goals: Good  Regarding Bridgette Martins's therapy, I certify that the treatment plan above will be carried out by a therapist or under their direction.   Thank you for this referral,  Talon Lei, PT

## 2019-04-11 NOTE — PROGRESS NOTES
Nayeli Connelly  : 1960  Primary: [facility] [place]  Secondary:  [address] at [facility]  [address] [facility], [place] [address]  [phone]   [phone]      OUTPATIENT PHYSICAL THERAPY: Daily Treatment Note 2019    Pre-treatment Symptoms/Complaints: Patient reports minimal pain today but soreness in legs from doing yard work this week. Pain: Initial: Pain Intensity 1: 2  Pain Location 1: Back  Pain Orientation 1: Lower  Post Session:  1/10    Medications Last Reviewed:  19  Precautions: No bending, twisting, or lifting greater than 15 lbs  Date of Onset: Left L3-L4 laminectomy on 19  Updated Objective Findings:  See progress note   TREATMENT:   THERAPEUTIC EXERCISE: ( 54 minutes):  Exercises per grid below to improve mobility, strength, balance and coordination. Required minimal visual, verbal and manual cues to promote proper body alignment, promote proper body posture and promote proper body mechanics. Progressed resistance, range, repetitions and complexity of movement as indicated. MANUAL THERAPY:  None     Date:  19 Date:  19 Date:  19   Activity/Exercise Parameters Parameters Parameters   clamshells X 20 reps 5 sec hold  2 min, B 1x15, B   bridge X 20 reps 5 sec hold 2 min, with ABD, blue 1x15   Abdominal bracing 2 min, ball squeeze with pelvic tilt 2 min, with ball squeeze and pelvic tilt 2 min, with ball squeeze and transverse contraction and pelvic tilt   Hamstring stretch 4x30 sec hold with green strap 2x30 sec, B 2x30 sec hold strap supine BL   Hip abduction Standing 2x10 reps 2 lb wt. s   1x10, B, standing   Hip adduction  Yellow ball x 20 reps 5 sec hold      Hip abduction 2 lb wt.s 2x10 reps  2x10, B, standing    Heel raise  2x10 1x10   Sit to stand  2x10, chair with airex 1x10, chair with airex         Step ups X 10 rep 6 inch fwd & lateral  1x10, B, 6 in    Calf stretch 4x30 sec hold on incline [id number], incline 4x30 sec  hold incline   Rows X 2 min  Blue band  1 min x 2, blue 2 min, blue   Single knee to chest 4x30 sec hold      Piriformis stretch  4x30 BLE's  [id number], B [id number], B   Nu step  Level 5, 12 mins Level 5, 10 min Level 5, 8 min   I T band  4x30 sec hold with green strap  [id number], B                Treatment/Session Summary:     · Response to Treatment:  Patient continues to tolerate all exercises with no complaints. Patient reports that she would like to follow up with MD before deciding to continue therapy.   · Communication/Consultation:  HEP reviewed with patient  · Equipment provided today:  None today  · Recommendations/Intent for next treatment session: Hold therapy until follow up with MD.  Treatment Plan of Care Effective Dates: 2/27/19 to 4/13/19  Total Treatment Billable Duration:  54  minutes    PT Patient Time In/Time Out  Time In: 1000  Time Out: [facility], PT

## 2019-04-16 ENCOUNTER — PATIENT OUTREACH (OUTPATIENT)
Dept: OTHER | Age: 59
End: 2019-04-16

## 2019-04-16 NOTE — PROGRESS NOTES
BASIA LARA called patient for follow-up. Patient identifiers confirmed, zip code and . Patient updates Ms. Andrés Bhandari stated that she still has not been approved from [facility] from [address] [place] [address].  Ms. Andrés Bhandari was told the she has additional paperwork to complete in order for her application could be further processed. Ms. Andrés Bhandari stated that she met with her pain management doctor this morning. The doctor assisted in in completed paperwork. Ms. Andrés Bhandari stated that pain management doctor referred her for Advanced Patient Therapy to assess her ability to return to work. Social Security Administration denied patient initial disability request.  Social Security  appealed decision. Patient follow-up BASIA LARA will follow-up with patient in 2 weeks.

## 2019-04-29 ENCOUNTER — PATIENT OUTREACH (OUTPATIENT)
Dept: OTHER | Age: 59
End: 2019-04-29

## 2019-04-30 ENCOUNTER — PATIENT OUTREACH (OUTPATIENT)
Dept: OTHER | Age: 59
End: 2019-04-30

## 2019-04-30 NOTE — PROGRESS NOTES
BASIA LARA contacted patient for follow-up. Patient identifiers confirmed, zip code and . Patient updates Ms. Ned Gomez stated that she has not received approval for LTD as of yet. Her Social Security  ([facility])  contacted [employer] to receive an update. Reportedly they were informed that she had not denied, the claim was in progress. Ms. Ned Gomez stated that she would follow-up with [employer] on 5/3/19. Physically, Ms. Ned Gomez stated that she \"tripped\" entering [facility], 19. She stated that \"jarring messed up my back. \"  She is unable to complete housework and yard work. She is hiring people to complete these tasks now. Ms. Ned Gomez completed PT. Reportedly, the soreness has decreased. Ms. Ned Gomez stated that she is still out on FMLA until May 2019. She will have the [place] paperwork completed by her physician. Plan of action Ms. Ned Gomez will call patient in 2 weeks for update.

## 2019-05-08 ENCOUNTER — PATIENT OUTREACH (OUTPATIENT)
Dept: OTHER | Age: 59
End: 2019-05-08

## 2019-05-08 NOTE — PROGRESS NOTES
Patient left BASIA LARA a VM stating that [employer] approved her for LTD with stipulations. According to patient, she will not to participate in vocational rehab to determine if she can be taught additional work skills. Ms. Brad López stated that she will call BASIA LARA later in the week with additional updates.

## 2019-05-16 ENCOUNTER — PATIENT OUTREACH (OUTPATIENT)
Dept: OTHER | Age: 59
End: 2019-05-16

## 2019-05-16 NOTE — PROGRESS NOTES
MSW ECM contacted patient for follow-up. Patient identifiers confirmed, zip code and . Patient update Ms. Kya Lentz confirmed that she was temporarily approved LTD from [address] [place] [address].  Ms. Kya Lentz stated that  [employer] informed her that her case would be further evaluated. According to patient, she received the remaining balance of the STD funds and the first LTD payment. Ms. Kya Lentz stated that she is feeling good about the support that she is receiving from attorneys regarding the process of being approved for LTD and SSA Disability. Patient stated that she did not have additional MSW ECM needs at this time.

## 2019-05-20 ENCOUNTER — PATIENT OUTREACH (OUTPATIENT)
Dept: OTHER | Age: 59
End: 2019-05-20

## 2019-05-20 NOTE — PROGRESS NOTES
CCM Telephonic outreach attempt to follow up with patient and touch base regarding process. Left a discreet VM with a request for a return call. Will await a return call or reach out again in 1-2 weeks.

## 2019-05-28 ENCOUNTER — PATIENT OUTREACH (OUTPATIENT)
Dept: OTHER | Age: 59
End: 2019-05-28

## 2019-05-28 NOTE — PROGRESS NOTES
Received return call from patient, who left this care manager a message. This care manager reached back out to patient to follow up regarding LTD and her employment. Patient verified  and zip code. Patient reports that she received her first check from temporary disability. Patient reports that Aida Simms will continue to request information from the patient's doctor on progress to determine full LTD disability. [facility] Baron Domniique has been working with patient to identify a new insurance company once she is no longer covered with [employer], which will be in mid . Patient physically still having lower back pain, having difficulty standing for any length of time or bending. Patient currently using ice while on the phone with this CM. Emotionally, patient is feeling stronger and is motivated to find things to keep her busy while she is not working. This care manager will check in with patient in mid , prior to graduating patient from care management.

## 2019-06-17 ENCOUNTER — PATIENT OUTREACH (OUTPATIENT)
Dept: OTHER | Age: 59
End: 2019-06-17

## 2019-06-17 NOTE — PROGRESS NOTES
BASIA LARA contacted patient for follow-up. Patient identifiers confirmed, zip code and . Patient updates  Ms. Eleuterio Lentz stated that she has [employer] effective 19 through the [employer]. The cost is $92.43/month with the $1200/month subsidy. Patient stated that the plan has a $300 deductible. She was happy with the plan specifics. Ms. Eleuterio Lentz stated that her FMLA ended yesterday. Reportedly, her [employer] health insurance ends 19. Ms. Eleuterio Lentz stated that he continues to receive long term disability income from [address] [place] [address].    The patient stated that she fell 2 weeks ago while taking out trash at her mother's home in the rain. Patient is hopeful that she has not re-injured herself. Ms. Eleuterio Lentz is excited about her upcoming vacation to the beach with her son from -. The patient stated that she feels great mentally, emotionally and physically. She thanked BASIA [facility] for the work that the [facility] team provided her. Plan of Action  No additional BASIA LARA intervention needed.

## 2019-08-13 ENCOUNTER — HOSPITAL ENCOUNTER (OUTPATIENT)
Dept: LAB | Age: 59
Discharge: HOME | End: 2019-08-13
Payer: COMMERCIAL

## 2019-08-13 DIAGNOSIS — C92.11 CML IN REMISSION (HCC): ICD-10-CM

## 2019-08-13 LAB
ALBUMIN SERPL-MCNC: 4.1 G/DL (ref 3.5–5)
ALBUMIN/GLOB SERPL: 1.2 {RATIO} (ref 1.2–3.5)
ALP SERPL-CCNC: 88 U/L (ref 50–136)
ALT SERPL-CCNC: 22 U/L (ref 12–65)
ANION GAP SERPL CALC-SCNC: 7 MMOL/L (ref 7–16)
AST SERPL-CCNC: 15 U/L (ref 15–37)
BASOPHILS # BLD: 0.1 K/UL (ref 0–0.2)
BASOPHILS NFR BLD: 1 % (ref 0–2)
BILIRUB SERPL-MCNC: 0.4 MG/DL (ref 0.2–1.1)
BUN SERPL-MCNC: 11 MG/DL (ref 6–23)
CALCIUM SERPL-MCNC: 9.1 MG/DL (ref 8.3–10.4)
CHLORIDE SERPL-SCNC: 105 MMOL/L (ref 98–107)
CO2 SERPL-SCNC: 27 MMOL/L (ref 21–32)
CREAT SERPL-MCNC: 0.73 MG/DL (ref 0.6–1)
DIFFERENTIAL METHOD BLD: ABNORMAL
EOSINOPHIL # BLD: 0.3 K/UL (ref 0–0.8)
EOSINOPHIL NFR BLD: 5 % (ref 0.5–7.8)
ERYTHROCYTE [DISTWIDTH] IN BLOOD BY AUTOMATED COUNT: 15.4 % (ref 11.9–14.6)
GLOBULIN SER CALC-MCNC: 3.5 G/DL (ref 2.3–3.5)
GLUCOSE SERPL-MCNC: 133 MG/DL (ref 65–100)
HCT VFR BLD AUTO: 32.7 % (ref 35.8–46.3)
HGB BLD-MCNC: 10 G/DL (ref 11.7–15.4)
IMM GRANULOCYTES # BLD AUTO: 0 K/UL (ref 0–0.5)
IMM GRANULOCYTES NFR BLD AUTO: 1 % (ref 0–5)
LYMPHOCYTES # BLD: 1.7 K/UL (ref 0.5–4.6)
LYMPHOCYTES NFR BLD: 26 % (ref 13–44)
Lab: NORMAL
MCH RBC QN AUTO: 22 PG (ref 26.1–32.9)
MCHC RBC AUTO-ENTMCNC: 30.6 G/DL (ref 31.4–35)
MCV RBC AUTO: 72 FL (ref 79.6–97.8)
MONOCYTES # BLD: 0.6 K/UL (ref 0.1–1.3)
MONOCYTES NFR BLD: 9 % (ref 4–12)
NEUTS SEG # BLD: 3.7 K/UL (ref 1.7–8.2)
NEUTS SEG NFR BLD: 58 % (ref 43–78)
NRBC # BLD: 0 K/UL (ref 0–0.2)
PLATELET # BLD AUTO: 298 K/UL (ref 150–450)
PMV BLD AUTO: 10.4 FL (ref 9.4–12.3)
POTASSIUM SERPL-SCNC: 4 MMOL/L (ref 3.5–5.1)
PROT SERPL-MCNC: 7.6 G/DL (ref 6.3–8.2)
RBC # BLD AUTO: 4.54 M/UL (ref 4.05–5.25)
REFERENCE LAB,REFLB: NORMAL
SODIUM SERPL-SCNC: 139 MMOL/L (ref 136–145)
TEST DESCRIPTION:,ATST: NORMAL
WBC # BLD AUTO: 6.3 K/UL (ref 4.3–11.1)

## 2019-08-13 PROCEDURE — 80053 COMPREHEN METABOLIC PANEL: CPT

## 2019-08-13 PROCEDURE — 85025 COMPLETE CBC W/AUTO DIFF WBC: CPT

## 2019-08-13 PROCEDURE — 36415 COLL VENOUS BLD VENIPUNCTURE: CPT

## 2019-09-24 PROBLEM — C80.1 CANCER (HCC): Status: RESOLVED | Noted: 2017-02-21 | Resolved: 2019-09-24

## 2019-11-15 ENCOUNTER — HOSPITAL ENCOUNTER (OUTPATIENT)
Dept: LAB | Age: 59
Discharge: HOME | End: 2019-11-15
Payer: COMMERCIAL

## 2019-11-15 DIAGNOSIS — C92.11 CML IN REMISSION (HCC): ICD-10-CM

## 2019-11-15 LAB
ALBUMIN SERPL-MCNC: 3.8 G/DL (ref 3.5–5)
ALBUMIN/GLOB SERPL: 1.1 {RATIO} (ref 1.2–3.5)
ALP SERPL-CCNC: 82 U/L (ref 50–136)
ALT SERPL-CCNC: 20 U/L (ref 12–65)
ANION GAP SERPL CALC-SCNC: 5 MMOL/L (ref 7–16)
AST SERPL-CCNC: 19 U/L (ref 15–37)
BASOPHILS # BLD: 0.1 K/UL (ref 0–0.2)
BASOPHILS NFR BLD: 1 % (ref 0–2)
BILIRUB SERPL-MCNC: 0.4 MG/DL (ref 0.2–1.1)
BUN SERPL-MCNC: 9 MG/DL (ref 6–23)
CALCIUM SERPL-MCNC: 8.3 MG/DL (ref 8.3–10.4)
CHLORIDE SERPL-SCNC: 109 MMOL/L (ref 98–107)
CO2 SERPL-SCNC: 27 MMOL/L (ref 21–32)
CREAT SERPL-MCNC: 0.88 MG/DL (ref 0.6–1)
DIFFERENTIAL METHOD BLD: ABNORMAL
EOSINOPHIL # BLD: 0.3 K/UL (ref 0–0.8)
EOSINOPHIL NFR BLD: 4 % (ref 0.5–7.8)
ERYTHROCYTE [DISTWIDTH] IN BLOOD BY AUTOMATED COUNT: 15.1 % (ref 11.9–14.6)
GLOBULIN SER CALC-MCNC: 3.4 G/DL (ref 2.3–3.5)
GLUCOSE SERPL-MCNC: 141 MG/DL (ref 65–100)
HCT VFR BLD AUTO: 31.8 % (ref 35.8–46.3)
HGB BLD-MCNC: 9.8 G/DL (ref 11.7–15.4)
IMM GRANULOCYTES # BLD AUTO: 0 K/UL (ref 0–0.5)
IMM GRANULOCYTES NFR BLD AUTO: 0 % (ref 0–5)
LYMPHOCYTES # BLD: 1.9 K/UL (ref 0.5–4.6)
LYMPHOCYTES NFR BLD: 27 % (ref 13–44)
Lab: NORMAL
MCH RBC QN AUTO: 22.4 PG (ref 26.1–32.9)
MCHC RBC AUTO-ENTMCNC: 30.8 G/DL (ref 31.4–35)
MCV RBC AUTO: 72.6 FL (ref 79.6–97.8)
MONOCYTES # BLD: 0.6 K/UL (ref 0.1–1.3)
MONOCYTES NFR BLD: 9 % (ref 4–12)
NEUTS SEG # BLD: 4.2 K/UL (ref 1.7–8.2)
NEUTS SEG NFR BLD: 59 % (ref 43–78)
NRBC # BLD: 0 K/UL (ref 0–0.2)
PLATELET # BLD AUTO: 270 K/UL (ref 150–450)
PMV BLD AUTO: 10 FL (ref 9.4–12.3)
POTASSIUM SERPL-SCNC: 4.3 MMOL/L (ref 3.5–5.1)
PROT SERPL-MCNC: 7.2 G/DL (ref 6.3–8.2)
RBC # BLD AUTO: 4.38 M/UL (ref 4.05–5.25)
REFERENCE LAB,REFLB: NORMAL
SODIUM SERPL-SCNC: 141 MMOL/L (ref 136–145)
TEST DESCRIPTION:,ATST: NORMAL
WBC # BLD AUTO: 7.1 K/UL (ref 4.3–11.1)

## 2019-11-15 PROCEDURE — 85025 COMPLETE CBC W/AUTO DIFF WBC: CPT

## 2019-11-15 PROCEDURE — 80053 COMPREHEN METABOLIC PANEL: CPT

## 2019-11-15 PROCEDURE — 36415 COLL VENOUS BLD VENIPUNCTURE: CPT

## 2020-02-14 ENCOUNTER — HOSPITAL ENCOUNTER (OUTPATIENT)
Dept: LAB | Age: 60
Discharge: HOME | End: 2020-02-14
Payer: COMMERCIAL

## 2020-02-14 DIAGNOSIS — C92.11 CML IN REMISSION (HCC): ICD-10-CM

## 2020-02-14 LAB
ALBUMIN SERPL-MCNC: 3.9 G/DL (ref 3.5–5)
ALBUMIN/GLOB SERPL: 1.1 {RATIO} (ref 1.2–3.5)
ALP SERPL-CCNC: 96 U/L (ref 50–136)
ALT SERPL-CCNC: 28 U/L (ref 12–65)
ANION GAP SERPL CALC-SCNC: 4 MMOL/L (ref 7–16)
AST SERPL-CCNC: 16 U/L (ref 15–37)
BASOPHILS # BLD: 0.1 K/UL (ref 0–0.2)
BASOPHILS NFR BLD: 1 % (ref 0–2)
BILIRUB SERPL-MCNC: 0.4 MG/DL (ref 0.2–1.1)
BUN SERPL-MCNC: 12 MG/DL (ref 6–23)
CALCIUM SERPL-MCNC: 8.9 MG/DL (ref 8.3–10.4)
CHLORIDE SERPL-SCNC: 107 MMOL/L (ref 98–107)
CO2 SERPL-SCNC: 29 MMOL/L (ref 21–32)
CREAT SERPL-MCNC: 0.75 MG/DL (ref 0.6–1)
DIFFERENTIAL METHOD BLD: ABNORMAL
EOSINOPHIL # BLD: 0.3 K/UL (ref 0–0.8)
EOSINOPHIL NFR BLD: 4 % (ref 0.5–7.8)
ERYTHROCYTE [DISTWIDTH] IN BLOOD BY AUTOMATED COUNT: 15.9 % (ref 11.9–14.6)
GLOBULIN SER CALC-MCNC: 3.6 G/DL (ref 2.3–3.5)
GLUCOSE SERPL-MCNC: 118 MG/DL (ref 65–100)
HCT VFR BLD AUTO: 34.4 % (ref 35.8–46.3)
HGB BLD-MCNC: 10.4 G/DL (ref 11.7–15.4)
IMM GRANULOCYTES # BLD AUTO: 0 K/UL (ref 0–0.5)
IMM GRANULOCYTES NFR BLD AUTO: 1 % (ref 0–5)
LYMPHOCYTES # BLD: 2 K/UL (ref 0.5–4.6)
LYMPHOCYTES NFR BLD: 24 % (ref 13–44)
Lab: NORMAL
MCH RBC QN AUTO: 21.9 PG (ref 26.1–32.9)
MCHC RBC AUTO-ENTMCNC: 30.2 G/DL (ref 31.4–35)
MCV RBC AUTO: 72.4 FL (ref 79.6–97.8)
MONOCYTES # BLD: 0.7 K/UL (ref 0.1–1.3)
MONOCYTES NFR BLD: 8 % (ref 4–12)
NEUTS SEG # BLD: 5.2 K/UL (ref 1.7–8.2)
NEUTS SEG NFR BLD: 63 % (ref 43–78)
NRBC # BLD: 0 K/UL (ref 0–0.2)
PLATELET # BLD AUTO: 277 K/UL (ref 150–450)
PMV BLD AUTO: 10.2 FL (ref 9.4–12.3)
POTASSIUM SERPL-SCNC: 4 MMOL/L (ref 3.5–5.1)
PROT SERPL-MCNC: 7.5 G/DL (ref 6.3–8.2)
RBC # BLD AUTO: 4.75 M/UL (ref 4.05–5.25)
REFERENCE LAB,REFLB: NORMAL
SODIUM SERPL-SCNC: 140 MMOL/L (ref 136–145)
TEST DESCRIPTION:,ATST: NORMAL
WBC # BLD AUTO: 8.3 K/UL (ref 4.3–11.1)

## 2020-02-14 PROCEDURE — 80053 COMPREHEN METABOLIC PANEL: CPT

## 2020-02-14 PROCEDURE — 36415 COLL VENOUS BLD VENIPUNCTURE: CPT

## 2020-02-14 PROCEDURE — 85025 COMPLETE CBC W/AUTO DIFF WBC: CPT

## 2020-03-30 ENCOUNTER — HOSPITAL ENCOUNTER (OUTPATIENT)
Dept: LAB | Age: 60
Discharge: HOME | End: 2020-03-30
Payer: COMMERCIAL

## 2020-03-30 DIAGNOSIS — C92.10 CML (CHRONIC MYELOID LEUKEMIA) (HCC): ICD-10-CM

## 2020-03-30 LAB
ALBUMIN SERPL-MCNC: 3.9 G/DL (ref 3.5–5)
ALBUMIN/GLOB SERPL: 1.1 {RATIO} (ref 1.2–3.5)
ALP SERPL-CCNC: 77 U/L (ref 50–136)
ALT SERPL-CCNC: 30 U/L (ref 12–65)
ANION GAP SERPL CALC-SCNC: 4 MMOL/L (ref 7–16)
AST SERPL-CCNC: 23 U/L (ref 15–37)
BASOPHILS # BLD: 0.1 K/UL (ref 0–0.2)
BASOPHILS NFR BLD: 1 % (ref 0–2)
BILIRUB SERPL-MCNC: 0.4 MG/DL (ref 0.2–1.1)
BUN SERPL-MCNC: 9 MG/DL (ref 6–23)
CALCIUM SERPL-MCNC: 8.8 MG/DL (ref 8.3–10.4)
CHLORIDE SERPL-SCNC: 108 MMOL/L (ref 98–107)
CO2 SERPL-SCNC: 27 MMOL/L (ref 21–32)
CREAT SERPL-MCNC: 0.85 MG/DL (ref 0.6–1)
DIFFERENTIAL METHOD BLD: ABNORMAL
EOSINOPHIL # BLD: 0.2 K/UL (ref 0–0.8)
EOSINOPHIL NFR BLD: 3 % (ref 0.5–7.8)
ERYTHROCYTE [DISTWIDTH] IN BLOOD BY AUTOMATED COUNT: 15.9 % (ref 11.9–14.6)
GLOBULIN SER CALC-MCNC: 3.6 G/DL (ref 2.3–3.5)
GLUCOSE SERPL-MCNC: 134 MG/DL (ref 65–100)
HCT VFR BLD AUTO: 32.8 % (ref 35.8–46.3)
HGB BLD-MCNC: 10.1 G/DL (ref 11.7–15.4)
IMM GRANULOCYTES # BLD AUTO: 0 K/UL (ref 0–0.5)
IMM GRANULOCYTES NFR BLD AUTO: 0 % (ref 0–5)
LYMPHOCYTES # BLD: 2 K/UL (ref 0.5–4.6)
LYMPHOCYTES NFR BLD: 22 % (ref 13–44)
MCH RBC QN AUTO: 22.2 PG (ref 26.1–32.9)
MCHC RBC AUTO-ENTMCNC: 30.8 G/DL (ref 31.4–35)
MCV RBC AUTO: 72.1 FL (ref 79.6–97.8)
MONOCYTES # BLD: 0.8 K/UL (ref 0.1–1.3)
MONOCYTES NFR BLD: 9 % (ref 4–12)
NEUTS SEG # BLD: 6 K/UL (ref 1.7–8.2)
NEUTS SEG NFR BLD: 66 % (ref 43–78)
NRBC # BLD: 0 K/UL (ref 0–0.2)
PLATELET # BLD AUTO: 280 K/UL (ref 150–450)
PMV BLD AUTO: 10.3 FL (ref 9.4–12.3)
POTASSIUM SERPL-SCNC: 4.2 MMOL/L (ref 3.5–5.1)
PROT SERPL-MCNC: 7.5 G/DL (ref 6.3–8.2)
RBC # BLD AUTO: 4.55 M/UL (ref 4.05–5.25)
SODIUM SERPL-SCNC: 139 MMOL/L (ref 136–145)
WBC # BLD AUTO: 9.1 K/UL (ref 4.3–11.1)

## 2020-03-30 PROCEDURE — 80053 COMPREHEN METABOLIC PANEL: CPT

## 2020-03-30 PROCEDURE — 36415 COLL VENOUS BLD VENIPUNCTURE: CPT

## 2020-03-30 PROCEDURE — 85025 COMPLETE CBC W/AUTO DIFF WBC: CPT

## 2020-04-02 ENCOUNTER — HOSPITAL ENCOUNTER (OUTPATIENT)
Dept: LAB | Age: 60
Discharge: HOME | End: 2020-04-02

## 2020-04-02 DIAGNOSIS — C92.10 CML (CHRONIC MYELOID LEUKEMIA) (HCC): ICD-10-CM

## 2020-04-02 LAB
Lab: NORMAL
REFERENCE LAB,REFLB: NORMAL
TEST DESCRIPTION:,ATST: NORMAL

## 2020-06-23 PROBLEM — E11.9 TYPE 2 DIABETES MELLITUS WITHOUT COMPLICATION, WITHOUT LONG-TERM CURRENT USE OF INSULIN (HCC): Status: RESOLVED | Noted: 2018-07-30 | Resolved: 2020-06-23

## 2020-06-23 PROBLEM — N90.4 LICHEN SCLEROSUS OF FEMALE GENITALIA: Status: ACTIVE | Noted: 2020-06-23

## 2020-07-08 ENCOUNTER — HOSPITAL ENCOUNTER (OUTPATIENT)
Dept: MAMMOGRAPHY | Age: 60
Discharge: HOME | End: 2020-07-08
Attending: NURSE PRACTITIONER
Payer: COMMERCIAL

## 2020-07-08 DIAGNOSIS — Z12.39 SCREENING FOR MALIGNANT NEOPLASM OF BREAST: ICD-10-CM

## 2020-07-08 PROCEDURE — 77067 SCR MAMMO BI INCL CAD: CPT

## 2020-07-14 ENCOUNTER — HOSPITAL ENCOUNTER (OUTPATIENT)
Dept: LAB | Age: 60
Discharge: HOME | End: 2020-07-14
Payer: COMMERCIAL

## 2020-07-14 DIAGNOSIS — C92.10 CML (CHRONIC MYELOID LEUKEMIA) (HCC): ICD-10-CM

## 2020-07-14 DIAGNOSIS — D64.9 ANEMIA, UNSPECIFIED TYPE: ICD-10-CM

## 2020-07-14 LAB
ALBUMIN SERPL-MCNC: 3.8 G/DL (ref 3.5–5)
ALBUMIN/GLOB SERPL: 1.1 {RATIO} (ref 1.2–3.5)
ALP SERPL-CCNC: 73 U/L (ref 50–136)
ALT SERPL-CCNC: 34 U/L (ref 12–65)
ANION GAP SERPL CALC-SCNC: 5 MMOL/L (ref 7–16)
AST SERPL-CCNC: 22 U/L (ref 15–37)
BASOPHILS # BLD: 0.1 K/UL (ref 0–0.2)
BASOPHILS NFR BLD: 1 % (ref 0–2)
BILIRUB SERPL-MCNC: 0.7 MG/DL (ref 0.2–1.1)
BUN SERPL-MCNC: 9 MG/DL (ref 6–23)
CALCIUM SERPL-MCNC: 8.9 MG/DL (ref 8.3–10.4)
CHLORIDE SERPL-SCNC: 108 MMOL/L (ref 98–107)
CO2 SERPL-SCNC: 28 MMOL/L (ref 21–32)
CREAT SERPL-MCNC: 0.8 MG/DL (ref 0.6–1)
DIFFERENTIAL METHOD BLD: ABNORMAL
EOSINOPHIL # BLD: 0.2 K/UL (ref 0–0.8)
EOSINOPHIL NFR BLD: 3 % (ref 0.5–7.8)
ERYTHROCYTE [DISTWIDTH] IN BLOOD BY AUTOMATED COUNT: 15.4 % (ref 11.9–14.6)
FERRITIN SERPL-MCNC: 220 NG/ML (ref 8–388)
GLOBULIN SER CALC-MCNC: 3.4 G/DL (ref 2.3–3.5)
GLUCOSE SERPL-MCNC: 159 MG/DL (ref 65–100)
HCT VFR BLD AUTO: 31.7 % (ref 35.8–46.3)
HGB BLD-MCNC: 9.8 G/DL (ref 11.7–15.4)
IMM GRANULOCYTES # BLD AUTO: 0 K/UL (ref 0–0.5)
IMM GRANULOCYTES NFR BLD AUTO: 1 % (ref 0–5)
IRON SATN MFR SERPL: 38 %
IRON SERPL-MCNC: 107 UG/DL (ref 35–150)
LYMPHOCYTES # BLD: 1.4 K/UL (ref 0.5–4.6)
LYMPHOCYTES NFR BLD: 21 % (ref 13–44)
Lab: NORMAL
MCH RBC QN AUTO: 22.5 PG (ref 26.1–32.9)
MCHC RBC AUTO-ENTMCNC: 30.9 G/DL (ref 31.4–35)
MCV RBC AUTO: 72.9 FL (ref 79.6–97.8)
MONOCYTES # BLD: 0.6 K/UL (ref 0.1–1.3)
MONOCYTES NFR BLD: 9 % (ref 4–12)
NEUTS SEG # BLD: 4.3 K/UL (ref 1.7–8.2)
NEUTS SEG NFR BLD: 65 % (ref 43–78)
NRBC # BLD: 0 K/UL (ref 0–0.2)
PLATELET # BLD AUTO: 299 K/UL (ref 150–450)
PMV BLD AUTO: 10.3 FL (ref 9.4–12.3)
POTASSIUM SERPL-SCNC: 4.2 MMOL/L (ref 3.5–5.1)
PROT SERPL-MCNC: 7.2 G/DL (ref 6.3–8.2)
RBC # BLD AUTO: 4.35 M/UL (ref 4.05–5.25)
REFERENCE LAB,REFLB: NORMAL
SODIUM SERPL-SCNC: 141 MMOL/L (ref 136–145)
TEST DESCRIPTION:,ATST: NORMAL
TIBC SERPL-MCNC: 278 UG/DL (ref 250–450)
WBC # BLD AUTO: 6.6 K/UL (ref 4.3–11.1)

## 2020-07-14 PROCEDURE — 82728 ASSAY OF FERRITIN: CPT

## 2020-07-14 PROCEDURE — 80053 COMPREHEN METABOLIC PANEL: CPT

## 2020-07-14 PROCEDURE — 83540 ASSAY OF IRON: CPT

## 2020-07-14 PROCEDURE — 36415 COLL VENOUS BLD VENIPUNCTURE: CPT

## 2020-07-14 PROCEDURE — 85025 COMPLETE CBC W/AUTO DIFF WBC: CPT

## 2020-10-22 ENCOUNTER — HOSPITAL ENCOUNTER (OUTPATIENT)
Dept: LAB | Age: 60
Discharge: HOME | End: 2020-10-22
Payer: COMMERCIAL

## 2020-10-22 DIAGNOSIS — C92.10 CML (CHRONIC MYELOID LEUKEMIA) (HCC): ICD-10-CM

## 2020-10-22 LAB
ALBUMIN SERPL-MCNC: 3.7 G/DL (ref 3.5–5)
ALBUMIN/GLOB SERPL: 1.1 {RATIO} (ref 1.2–3.5)
ALP SERPL-CCNC: 74 U/L (ref 50–136)
ALT SERPL-CCNC: 33 U/L (ref 12–65)
ANION GAP SERPL CALC-SCNC: 2 MMOL/L (ref 7–16)
AST SERPL-CCNC: 26 U/L (ref 15–37)
BASOPHILS # BLD: 0.1 K/UL (ref 0–0.2)
BASOPHILS NFR BLD: 1 % (ref 0–2)
BILIRUB SERPL-MCNC: 0.7 MG/DL (ref 0.2–1.1)
BUN SERPL-MCNC: 9 MG/DL (ref 6–23)
CALCIUM SERPL-MCNC: 8.8 MG/DL (ref 8.3–10.4)
CHLORIDE SERPL-SCNC: 108 MMOL/L (ref 98–107)
CO2 SERPL-SCNC: 30 MMOL/L (ref 21–32)
CREAT SERPL-MCNC: 0.9 MG/DL (ref 0.6–1)
DIFFERENTIAL METHOD BLD: ABNORMAL
EOSINOPHIL # BLD: 0.3 K/UL (ref 0–0.8)
EOSINOPHIL NFR BLD: 3 % (ref 0.5–7.8)
ERYTHROCYTE [DISTWIDTH] IN BLOOD BY AUTOMATED COUNT: 15.2 % (ref 11.9–14.6)
GLOBULIN SER CALC-MCNC: 3.3 G/DL (ref 2.3–3.5)
GLUCOSE SERPL-MCNC: 98 MG/DL (ref 65–100)
HCT VFR BLD AUTO: 31.6 % (ref 35.8–46.3)
HGB BLD-MCNC: 9.7 G/DL (ref 11.7–15.4)
IMM GRANULOCYTES # BLD AUTO: 0 K/UL (ref 0–0.5)
IMM GRANULOCYTES NFR BLD AUTO: 1 % (ref 0–5)
LYMPHOCYTES # BLD: 2.2 K/UL (ref 0.5–4.6)
LYMPHOCYTES NFR BLD: 26 % (ref 13–44)
MCH RBC QN AUTO: 22.4 PG (ref 26.1–32.9)
MCHC RBC AUTO-ENTMCNC: 30.7 G/DL (ref 31.4–35)
MCV RBC AUTO: 73 FL (ref 79.6–97.8)
MONOCYTES # BLD: 0.8 K/UL (ref 0.1–1.3)
MONOCYTES NFR BLD: 9 % (ref 4–12)
NEUTS SEG # BLD: 5.3 K/UL (ref 1.7–8.2)
NEUTS SEG NFR BLD: 61 % (ref 43–78)
NRBC # BLD: 0 K/UL (ref 0–0.2)
PLATELET # BLD AUTO: 300 K/UL (ref 150–450)
PMV BLD AUTO: 10.5 FL (ref 9.4–12.3)
POTASSIUM SERPL-SCNC: 4 MMOL/L (ref 3.5–5.1)
PROT SERPL-MCNC: 7 G/DL (ref 6.3–8.2)
RBC # BLD AUTO: 4.33 M/UL (ref 4.05–5.25)
REFERENCE LAB,REFLB: NORMAL
SODIUM SERPL-SCNC: 140 MMOL/L (ref 136–145)
TEST DESCRIPTION:,ATST: NORMAL
WBC # BLD AUTO: 8.7 K/UL (ref 4.3–11.1)

## 2020-10-22 PROCEDURE — 85025 COMPLETE CBC W/AUTO DIFF WBC: CPT

## 2020-10-22 PROCEDURE — 80053 COMPREHEN METABOLIC PANEL: CPT

## 2020-10-22 PROCEDURE — 36415 COLL VENOUS BLD VENIPUNCTURE: CPT

## 2021-02-05 ENCOUNTER — HOSPITAL ENCOUNTER (OUTPATIENT)
Dept: LAB | Age: 61
Discharge: HOME | End: 2021-02-05
Payer: COMMERCIAL

## 2021-02-05 DIAGNOSIS — C92.10 CML (CHRONIC MYELOID LEUKEMIA) (HCC): ICD-10-CM

## 2021-02-05 LAB
ALBUMIN SERPL-MCNC: 4 G/DL (ref 3.5–5)
ALBUMIN/GLOB SERPL: 1.2 {RATIO} (ref 1.2–3.5)
ALP SERPL-CCNC: 74 U/L (ref 50–136)
ALT SERPL-CCNC: 24 U/L (ref 12–65)
ANION GAP SERPL CALC-SCNC: 4 MMOL/L (ref 7–16)
AST SERPL-CCNC: 18 U/L (ref 15–37)
BASOPHILS # BLD: 0.1 K/UL (ref 0–0.2)
BASOPHILS NFR BLD: 1 % (ref 0–2)
BILIRUB SERPL-MCNC: 0.5 MG/DL (ref 0.2–1.1)
BUN SERPL-MCNC: 9 MG/DL (ref 6–23)
CALCIUM SERPL-MCNC: 8.9 MG/DL (ref 8.3–10.4)
CHLORIDE SERPL-SCNC: 110 MMOL/L (ref 98–107)
CO2 SERPL-SCNC: 29 MMOL/L (ref 21–32)
CREAT SERPL-MCNC: 0.8 MG/DL (ref 0.6–1)
DIFFERENTIAL METHOD BLD: ABNORMAL
EOSINOPHIL # BLD: 0.2 K/UL (ref 0–0.8)
EOSINOPHIL NFR BLD: 3 % (ref 0.5–7.8)
ERYTHROCYTE [DISTWIDTH] IN BLOOD BY AUTOMATED COUNT: 15.6 % (ref 11.9–14.6)
GLOBULIN SER CALC-MCNC: 3.3 G/DL (ref 2.3–3.5)
GLUCOSE SERPL-MCNC: 102 MG/DL (ref 65–100)
HCT VFR BLD AUTO: 32.5 % (ref 35.8–46.3)
HGB BLD-MCNC: 9.7 G/DL (ref 11.7–15.4)
IMM GRANULOCYTES # BLD AUTO: 0 K/UL (ref 0–0.5)
IMM GRANULOCYTES NFR BLD AUTO: 0 % (ref 0–5)
LYMPHOCYTES # BLD: 2.6 K/UL (ref 0.5–4.6)
LYMPHOCYTES NFR BLD: 34 % (ref 13–44)
Lab: NORMAL
MCH RBC QN AUTO: 21.9 PG (ref 26.1–32.9)
MCHC RBC AUTO-ENTMCNC: 29.8 G/DL (ref 31.4–35)
MCV RBC AUTO: 73.4 FL (ref 79.6–97.8)
MONOCYTES # BLD: 0.7 K/UL (ref 0.1–1.3)
MONOCYTES NFR BLD: 9 % (ref 4–12)
NEUTS SEG # BLD: 4.1 K/UL (ref 1.7–8.2)
NEUTS SEG NFR BLD: 53 % (ref 43–78)
NRBC # BLD: 0 K/UL (ref 0–0.2)
PLATELET # BLD AUTO: 288 K/UL (ref 150–450)
PMV BLD AUTO: 10.2 FL (ref 9.4–12.3)
POTASSIUM SERPL-SCNC: 4.3 MMOL/L (ref 3.5–5.1)
PROT SERPL-MCNC: 7.3 G/DL (ref 6.3–8.2)
RBC # BLD AUTO: 4.43 M/UL (ref 4.05–5.25)
REFERENCE LAB,REFLB: NORMAL
SODIUM SERPL-SCNC: 143 MMOL/L (ref 136–145)
TEST DESCRIPTION:,ATST: NORMAL
WBC # BLD AUTO: 7.7 K/UL (ref 4.3–11.1)

## 2021-02-05 PROCEDURE — 36415 COLL VENOUS BLD VENIPUNCTURE: CPT

## 2021-02-05 PROCEDURE — 80053 COMPREHEN METABOLIC PANEL: CPT

## 2021-02-05 PROCEDURE — 85025 COMPLETE CBC W/AUTO DIFF WBC: CPT

## 2021-06-04 ENCOUNTER — HOSPITAL ENCOUNTER (OUTPATIENT)
Dept: LAB | Age: 61
Discharge: HOME | End: 2021-06-04
Payer: MEDICARE

## 2021-06-04 DIAGNOSIS — C92.10 CML (CHRONIC MYELOID LEUKEMIA) (HCC): ICD-10-CM

## 2021-06-04 LAB
ALBUMIN SERPL-MCNC: 3.7 G/DL (ref 3.5–5)
ALBUMIN/GLOB SERPL: 1.1 {RATIO} (ref 1.2–3.5)
ALP SERPL-CCNC: 86 U/L (ref 50–136)
ALT SERPL-CCNC: 29 U/L (ref 12–65)
ANION GAP SERPL CALC-SCNC: 4 MMOL/L (ref 7–16)
AST SERPL-CCNC: 22 U/L (ref 15–37)
BASOPHILS # BLD: 0.1 K/UL (ref 0–0.2)
BASOPHILS NFR BLD: 1 % (ref 0–2)
BILIRUB SERPL-MCNC: 0.4 MG/DL (ref 0.2–1.1)
BUN SERPL-MCNC: 11 MG/DL (ref 6–23)
CALCIUM SERPL-MCNC: 8.6 MG/DL (ref 8.3–10.4)
CHLORIDE SERPL-SCNC: 106 MMOL/L (ref 98–107)
CO2 SERPL-SCNC: 29 MMOL/L (ref 21–32)
CREAT SERPL-MCNC: 0.9 MG/DL (ref 0.6–1)
DIFFERENTIAL METHOD BLD: ABNORMAL
EOSINOPHIL # BLD: 0.3 K/UL (ref 0–0.8)
EOSINOPHIL NFR BLD: 3 % (ref 0.5–7.8)
ERYTHROCYTE [DISTWIDTH] IN BLOOD BY AUTOMATED COUNT: 14.9 % (ref 11.9–14.6)
GLOBULIN SER CALC-MCNC: 3.3 G/DL (ref 2.3–3.5)
GLUCOSE SERPL-MCNC: 127 MG/DL (ref 65–100)
HCT VFR BLD AUTO: 31.4 % (ref 35.8–46.3)
HGB BLD-MCNC: 9.9 G/DL (ref 11.7–15.4)
IMM GRANULOCYTES # BLD AUTO: 0 K/UL (ref 0–0.5)
IMM GRANULOCYTES NFR BLD AUTO: 0 % (ref 0–5)
LYMPHOCYTES # BLD: 2.9 K/UL (ref 0.5–4.6)
LYMPHOCYTES NFR BLD: 26 % (ref 13–44)
Lab: NORMAL
MCH RBC QN AUTO: 22.7 PG (ref 26.1–32.9)
MCHC RBC AUTO-ENTMCNC: 31.5 G/DL (ref 31.4–35)
MCV RBC AUTO: 71.9 FL (ref 79.6–97.8)
MONOCYTES # BLD: 0.9 K/UL (ref 0.1–1.3)
MONOCYTES NFR BLD: 9 % (ref 4–12)
NEUTS SEG # BLD: 6.9 K/UL (ref 1.7–8.2)
NEUTS SEG NFR BLD: 62 % (ref 43–78)
NRBC # BLD: 0 K/UL (ref 0–0.2)
PLATELET # BLD AUTO: 278 K/UL (ref 150–450)
PMV BLD AUTO: 10.6 FL (ref 9.4–12.3)
POTASSIUM SERPL-SCNC: 4.1 MMOL/L (ref 3.5–5.1)
PROT SERPL-MCNC: 7 G/DL (ref 6.3–8.2)
RBC # BLD AUTO: 4.37 M/UL (ref 4.05–5.2)
REFERENCE LAB,REFLB: NORMAL
SODIUM SERPL-SCNC: 139 MMOL/L (ref 136–145)
TEST DESCRIPTION:,ATST: NORMAL
WBC # BLD AUTO: 11.1 K/UL (ref 4.3–11.1)

## 2021-06-04 PROCEDURE — 80053 COMPREHEN METABOLIC PANEL: CPT

## 2021-06-04 PROCEDURE — 36415 COLL VENOUS BLD VENIPUNCTURE: CPT

## 2021-06-04 PROCEDURE — 85025 COMPLETE CBC W/AUTO DIFF WBC: CPT

## 2021-10-20 ENCOUNTER — HOSPITAL ENCOUNTER (OUTPATIENT)
Dept: LAB | Age: 61
Discharge: HOME | End: 2021-10-20
Payer: MEDICARE

## 2021-10-20 DIAGNOSIS — D64.9 ANEMIA, UNSPECIFIED TYPE: ICD-10-CM

## 2021-10-20 DIAGNOSIS — C92.10 CML (CHRONIC MYELOID LEUKEMIA) (HCC): ICD-10-CM

## 2021-10-20 LAB
ALBUMIN SERPL-MCNC: 3.7 G/DL (ref 3.2–4.6)
ALBUMIN/GLOB SERPL: 1.1 {RATIO} (ref 1.2–3.5)
ALP SERPL-CCNC: 68 U/L (ref 50–136)
ALT SERPL-CCNC: 31 U/L (ref 12–65)
ANION GAP SERPL CALC-SCNC: 4 MMOL/L (ref 7–16)
AST SERPL-CCNC: 23 U/L (ref 15–37)
BASOPHILS # BLD: 0.1 K/UL (ref 0–0.2)
BASOPHILS NFR BLD: 1 % (ref 0–2)
BILIRUB SERPL-MCNC: 0.5 MG/DL (ref 0.2–1.1)
BUN SERPL-MCNC: 10 MG/DL (ref 8–23)
CALCIUM SERPL-MCNC: 9.3 MG/DL (ref 8.3–10.4)
CHLORIDE SERPL-SCNC: 109 MMOL/L (ref 98–107)
CO2 SERPL-SCNC: 28 MMOL/L (ref 21–32)
CREAT SERPL-MCNC: 0.9 MG/DL (ref 0.6–1)
DIFFERENTIAL METHOD BLD: ABNORMAL
EOSINOPHIL # BLD: 0.3 K/UL (ref 0–0.8)
EOSINOPHIL NFR BLD: 5 % (ref 0.5–7.8)
ERYTHROCYTE [DISTWIDTH] IN BLOOD BY AUTOMATED COUNT: 16.1 % (ref 11.9–14.6)
GLOBULIN SER CALC-MCNC: 3.3 G/DL (ref 2.3–3.5)
GLUCOSE SERPL-MCNC: 127 MG/DL (ref 65–100)
HCT VFR BLD AUTO: 29.2 % (ref 35.8–46.3)
HGB BLD-MCNC: 9.1 G/DL (ref 11.7–15.4)
IMM GRANULOCYTES # BLD AUTO: 0 K/UL (ref 0–0.5)
IMM GRANULOCYTES NFR BLD AUTO: 0 % (ref 0–5)
LYMPHOCYTES # BLD: 2 K/UL (ref 0.5–4.6)
LYMPHOCYTES NFR BLD: 31 % (ref 13–44)
Lab: NORMAL
MCH RBC QN AUTO: 22.5 PG (ref 26.1–32.9)
MCHC RBC AUTO-ENTMCNC: 31.2 G/DL (ref 31.4–35)
MCV RBC AUTO: 72.3 FL (ref 79.6–97.8)
MONOCYTES # BLD: 0.6 K/UL (ref 0.1–1.3)
MONOCYTES NFR BLD: 10 % (ref 4–12)
NEUTS SEG # BLD: 3.4 K/UL (ref 1.7–8.2)
NEUTS SEG NFR BLD: 53 % (ref 43–78)
NRBC # BLD: 0 K/UL (ref 0–0.2)
PLATELET # BLD AUTO: 277 K/UL (ref 150–450)
PMV BLD AUTO: 10.9 FL (ref 9.4–12.3)
POTASSIUM SERPL-SCNC: 3.8 MMOL/L (ref 3.5–5.1)
PROT SERPL-MCNC: 7 G/DL (ref 6.3–8.2)
RBC # BLD AUTO: 4.04 M/UL (ref 4.05–5.2)
REFERENCE LAB,REFLB: NORMAL
SODIUM SERPL-SCNC: 141 MMOL/L (ref 136–145)
TEST DESCRIPTION:,ATST: NORMAL
WBC # BLD AUTO: 6.4 K/UL (ref 4.3–11.1)

## 2021-10-20 PROCEDURE — 80053 COMPREHEN METABOLIC PANEL: CPT

## 2021-10-20 PROCEDURE — 83540 ASSAY OF IRON: CPT

## 2021-10-20 PROCEDURE — 36415 COLL VENOUS BLD VENIPUNCTURE: CPT

## 2021-10-20 PROCEDURE — 85025 COMPLETE CBC W/AUTO DIFF WBC: CPT

## 2021-10-20 PROCEDURE — 82728 ASSAY OF FERRITIN: CPT

## 2021-10-22 LAB
FERRITIN SERPL-MCNC: 238 NG/ML (ref 8–388)
IRON SATN MFR SERPL: 34 %
IRON SERPL-MCNC: 88 UG/DL (ref 35–150)
TIBC SERPL-MCNC: 260 UG/DL (ref 250–450)

## 2021-11-29 ENCOUNTER — TRANSCRIBE ORDER (OUTPATIENT)
Dept: SCHEDULING | Age: 61
End: 2021-11-29

## 2021-11-29 DIAGNOSIS — Z12.31 VISIT FOR SCREENING MAMMOGRAM: Primary | ICD-10-CM

## 2022-01-14 ENCOUNTER — HOSPITAL ENCOUNTER (OUTPATIENT)
Dept: LAB | Age: 62
Discharge: HOME | End: 2022-01-14
Payer: MEDICARE

## 2022-01-14 ENCOUNTER — HOSPITAL ENCOUNTER (OUTPATIENT)
Dept: MAMMOGRAPHY | Age: 62
Discharge: HOME | End: 2022-01-14
Attending: NURSE PRACTITIONER
Payer: MEDICARE

## 2022-01-14 DIAGNOSIS — D64.9 ANEMIA, UNSPECIFIED TYPE: ICD-10-CM

## 2022-01-14 DIAGNOSIS — C92.10 CML (CHRONIC MYELOID LEUKEMIA) (HCC): ICD-10-CM

## 2022-01-14 DIAGNOSIS — Z12.31 VISIT FOR SCREENING MAMMOGRAM: ICD-10-CM

## 2022-01-14 LAB
ALBUMIN SERPL-MCNC: 3.7 G/DL (ref 3.2–4.6)
ALBUMIN/GLOB SERPL: 1.1 {RATIO} (ref 1.2–3.5)
ALP SERPL-CCNC: 67 U/L (ref 50–136)
ALT SERPL-CCNC: 30 U/L (ref 12–65)
ANION GAP SERPL CALC-SCNC: 5 MMOL/L (ref 7–16)
AST SERPL-CCNC: 20 U/L (ref 15–37)
BASOPHILS # BLD: 0.1 K/UL (ref 0–0.2)
BASOPHILS NFR BLD: 1 % (ref 0–2)
BILIRUB SERPL-MCNC: 0.5 MG/DL (ref 0.2–1.1)
BUN SERPL-MCNC: 12 MG/DL (ref 8–23)
CALCIUM SERPL-MCNC: 9.1 MG/DL (ref 8.3–10.4)
CHLORIDE SERPL-SCNC: 106 MMOL/L (ref 98–107)
CO2 SERPL-SCNC: 27 MMOL/L (ref 21–32)
CREAT SERPL-MCNC: 0.8 MG/DL (ref 0.6–1)
DIFFERENTIAL METHOD BLD: ABNORMAL
EOSINOPHIL # BLD: 0.1 K/UL (ref 0–0.8)
EOSINOPHIL NFR BLD: 1 % (ref 0.5–7.8)
ERYTHROCYTE [DISTWIDTH] IN BLOOD BY AUTOMATED COUNT: 15.5 % (ref 11.9–14.6)
FOLATE SERPL-MCNC: 9.3 NG/ML (ref 3.1–17.5)
GLOBULIN SER CALC-MCNC: 3.5 G/DL (ref 2.3–3.5)
GLUCOSE SERPL-MCNC: 119 MG/DL (ref 65–100)
HCT VFR BLD AUTO: 31 % (ref 35.8–46.3)
HGB BLD-MCNC: 9.5 G/DL (ref 11.7–15.4)
IMM GRANULOCYTES # BLD AUTO: 0 K/UL (ref 0–0.5)
IMM GRANULOCYTES NFR BLD AUTO: 0 % (ref 0–5)
LYMPHOCYTES # BLD: 1.7 K/UL (ref 0.5–4.6)
LYMPHOCYTES NFR BLD: 18 % (ref 13–44)
Lab: NORMAL
MCH RBC QN AUTO: 22.6 PG (ref 26.1–32.9)
MCHC RBC AUTO-ENTMCNC: 30.6 G/DL (ref 31.4–35)
MCV RBC AUTO: 73.8 FL (ref 79.6–97.8)
MONOCYTES # BLD: 0.7 K/UL (ref 0.1–1.3)
MONOCYTES NFR BLD: 8 % (ref 4–12)
NEUTS SEG # BLD: 6.6 K/UL (ref 1.7–8.2)
NEUTS SEG NFR BLD: 72 % (ref 43–78)
NRBC # BLD: 0 K/UL (ref 0–0.2)
PLATELET # BLD AUTO: 285 K/UL (ref 150–450)
PMV BLD AUTO: 10 FL (ref 9.4–12.3)
POTASSIUM SERPL-SCNC: 3.8 MMOL/L (ref 3.5–5.1)
PROT SERPL-MCNC: 7.2 G/DL (ref 6.3–8.2)
RBC # BLD AUTO: 4.2 M/UL (ref 4.05–5.2)
REFERENCE LAB,REFLB: NORMAL
SODIUM SERPL-SCNC: 138 MMOL/L (ref 136–145)
TEST DESCRIPTION:,ATST: NORMAL
VIT B12 SERPL-MCNC: 228 PG/ML (ref 193–986)
WBC # BLD AUTO: 9.2 K/UL (ref 4.3–11.1)

## 2022-01-14 PROCEDURE — 36415 COLL VENOUS BLD VENIPUNCTURE: CPT

## 2022-01-14 PROCEDURE — 82746 ASSAY OF FOLIC ACID SERUM: CPT

## 2022-01-14 PROCEDURE — 82607 VITAMIN B-12: CPT

## 2022-01-14 PROCEDURE — 80053 COMPREHEN METABOLIC PANEL: CPT

## 2022-01-14 PROCEDURE — 85025 COMPLETE CBC W/AUTO DIFF WBC: CPT

## 2022-01-14 PROCEDURE — 77063 BREAST TOMOSYNTHESIS BI: CPT

## 2022-03-18 PROBLEM — G89.29 CHRONIC BILATERAL LOW BACK PAIN WITH LEFT-SIDED SCIATICA: Status: ACTIVE | Noted: 2018-02-01

## 2022-03-18 PROBLEM — M50.30 DDD (DEGENERATIVE DISC DISEASE), CERVICAL: Status: ACTIVE | Noted: 2017-07-18

## 2022-03-18 PROBLEM — N90.4 LICHEN SCLEROSUS OF FEMALE GENITALIA: Status: ACTIVE | Noted: 2020-06-23

## 2022-03-18 PROBLEM — E07.9 THYROID DISEASE: Status: ACTIVE | Noted: 2017-02-21

## 2022-03-18 PROBLEM — M54.42 CHRONIC BILATERAL LOW BACK PAIN WITH LEFT-SIDED SCIATICA: Status: ACTIVE | Noted: 2018-02-01

## 2022-03-19 PROBLEM — M51.36 DDD (DEGENERATIVE DISC DISEASE), LUMBAR: Status: ACTIVE | Noted: 2018-12-20

## 2022-03-19 PROBLEM — M19.90 ARTHRITIS: Status: ACTIVE | Noted: 2017-02-21

## 2022-03-19 PROBLEM — M51.26 HNP (HERNIATED NUCLEUS PULPOSUS), LUMBAR: Status: ACTIVE | Noted: 2018-12-20

## 2022-03-19 PROBLEM — M54.16 LUMBAR RADICULOPATHY, CHRONIC: Status: ACTIVE | Noted: 2019-02-19

## 2022-03-19 PROBLEM — M51.369 DDD (DEGENERATIVE DISC DISEASE), LUMBAR: Status: ACTIVE | Noted: 2018-12-20

## 2022-03-19 PROBLEM — M48.062 LUMBAR STENOSIS WITH NEUROGENIC CLAUDICATION: Status: ACTIVE | Noted: 2018-02-01

## 2022-03-19 PROBLEM — D64.9 ANEMIA: Status: ACTIVE | Noted: 2017-02-21

## 2022-03-19 PROBLEM — H40.89 OTHER SPECIFIED GLAUCOMA: Status: ACTIVE | Noted: 2018-07-27

## 2022-03-19 PROBLEM — R73.03 PREDIABETES: Status: ACTIVE | Noted: 2017-07-25

## 2022-03-19 PROBLEM — E78.5 HYPERLIPIDEMIA: Status: ACTIVE | Noted: 2017-07-26

## 2022-03-19 PROBLEM — M54.2 NECK PAIN: Status: ACTIVE | Noted: 2017-07-18

## 2022-03-19 PROBLEM — E55.9 VITAMIN D DEFICIENCY: Status: ACTIVE | Noted: 2018-07-27

## 2022-03-20 PROBLEM — M48.02 CERVICAL STENOSIS OF SPINE: Status: ACTIVE | Noted: 2017-07-18

## 2022-05-20 ENCOUNTER — HOSPITAL ENCOUNTER (OUTPATIENT)
Dept: LAB | Age: 62
Discharge: HOME | End: 2022-05-20
Payer: MEDICARE

## 2022-05-20 DIAGNOSIS — C92.10 CML (CHRONIC MYELOID LEUKEMIA) (HCC): ICD-10-CM

## 2022-05-20 LAB
ALBUMIN SERPL-MCNC: 3.8 G/DL (ref 3.2–4.6)
ALBUMIN/GLOB SERPL: 1.2 {RATIO} (ref 1.2–3.5)
ALP SERPL-CCNC: 54 U/L (ref 50–136)
ALT SERPL-CCNC: 32 U/L (ref 12–65)
ANION GAP SERPL CALC-SCNC: 6 MMOL/L (ref 7–16)
AST SERPL-CCNC: 22 U/L (ref 15–37)
BASOPHILS # BLD: 0.1 K/UL (ref 0–0.2)
BASOPHILS NFR BLD: 1 % (ref 0–2)
BILIRUB SERPL-MCNC: 0.5 MG/DL (ref 0.2–1.1)
BUN SERPL-MCNC: 12 MG/DL (ref 8–23)
CALCIUM SERPL-MCNC: 9 MG/DL (ref 8.3–10.4)
CHLORIDE SERPL-SCNC: 108 MMOL/L (ref 98–107)
CO2 SERPL-SCNC: 26 MMOL/L (ref 21–32)
CREAT SERPL-MCNC: 0.9 MG/DL (ref 0.6–1)
DIFFERENTIAL METHOD BLD: ABNORMAL
EOSINOPHIL # BLD: 0.2 K/UL (ref 0–0.8)
EOSINOPHIL NFR BLD: 2 % (ref 0.5–7.8)
ERYTHROCYTE [DISTWIDTH] IN BLOOD BY AUTOMATED COUNT: 14.6 % (ref 11.9–14.6)
GLOBULIN SER CALC-MCNC: 3.3 G/DL (ref 2.3–3.5)
GLUCOSE SERPL-MCNC: 128 MG/DL (ref 65–100)
HCT VFR BLD AUTO: 32.7 % (ref 35.8–46.3)
HGB BLD-MCNC: 9.8 G/DL (ref 11.7–15.4)
IMM GRANULOCYTES # BLD AUTO: 0 K/UL (ref 0–0.5)
IMM GRANULOCYTES NFR BLD AUTO: 0 % (ref 0–5)
LYMPHOCYTES # BLD: 2.2 K/UL (ref 0.5–4.6)
LYMPHOCYTES NFR BLD: 26 % (ref 13–44)
Lab: NORMAL
MCH RBC QN AUTO: 22.3 PG (ref 26.1–32.9)
MCHC RBC AUTO-ENTMCNC: 30 G/DL (ref 31.4–35)
MCV RBC AUTO: 74.3 FL (ref 79.6–97.8)
MONOCYTES # BLD: 0.8 K/UL (ref 0.1–1.3)
MONOCYTES NFR BLD: 9 % (ref 4–12)
NEUTS SEG # BLD: 5.2 K/UL (ref 1.7–8.2)
NEUTS SEG NFR BLD: 62 % (ref 43–78)
NRBC # BLD: 0 K/UL (ref 0–0.2)
PLATELET # BLD AUTO: 255 K/UL (ref 150–450)
PMV BLD AUTO: 11.3 FL (ref 9.4–12.3)
POTASSIUM SERPL-SCNC: 4 MMOL/L (ref 3.5–5.1)
PROT SERPL-MCNC: 7.1 G/DL (ref 6.3–8.2)
RBC # BLD AUTO: 4.4 M/UL (ref 4.05–5.2)
REFERENCE LAB,REFLB: NORMAL
SODIUM SERPL-SCNC: 140 MMOL/L (ref 136–145)
TEST DESCRIPTION:,ATST: NORMAL
WBC # BLD AUTO: 8.5 K/UL (ref 4.3–11.1)

## 2022-05-20 PROCEDURE — 36415 COLL VENOUS BLD VENIPUNCTURE: CPT

## 2022-05-20 PROCEDURE — 80053 COMPREHEN METABOLIC PANEL: CPT

## 2022-05-20 PROCEDURE — 85025 COMPLETE CBC W/AUTO DIFF WBC: CPT

## 2022-06-06 ENCOUNTER — TELEPHONE (OUTPATIENT)
Dept: ONCOLOGY | Age: 62
End: 2022-06-06

## 2022-06-06 RX ORDER — IMATINIB MESYLATE 400 MG/1
400 TABLET, FILM COATED ORAL DAILY
Qty: 30 TABLET | Refills: 5 | Status: SHIPPED | OUTPATIENT
Start: 2022-06-06 | End: 2023-04-13 | Stop reason: SDUPTHER

## 2022-06-07 NOTE — PROGRESS NOTES
Per Louis Locke from [facility] specialty pharmacy  \"Delivery is set for 6/8, with a $0.0 co-pay  \" for Imatinib

## 2022-06-15 ENCOUNTER — TELEPHONE (OUTPATIENT)
Dept: ONCOLOGY | Age: 62
End: 2022-06-15

## 2022-06-20 ENCOUNTER — TELEPHONE (OUTPATIENT)
Dept: ONCOLOGY | Age: 62
End: 2022-06-20

## 2022-06-23 ENCOUNTER — OFFICE VISIT (OUTPATIENT)
Dept: ONCOLOGY | Age: 62
End: 2022-06-23
Payer: MEDICARE

## 2022-06-23 VITALS
SYSTOLIC BLOOD PRESSURE: 123 MMHG | DIASTOLIC BLOOD PRESSURE: 79 MMHG | WEIGHT: 193 LBS | HEART RATE: 84 BPM | RESPIRATION RATE: 16 BRPM | TEMPERATURE: 98.9 F | BODY MASS INDEX: 32.62 KG/M2 | OXYGEN SATURATION: 99 %

## 2022-06-23 DIAGNOSIS — C92.10 CML (CHRONIC MYELOID LEUKEMIA) (HCC): Primary | ICD-10-CM

## 2022-06-23 PROCEDURE — 99213 OFFICE O/P EST LOW 20 MIN: CPT | Performed by: INTERNAL MEDICINE

## 2022-06-23 ASSESSMENT — PATIENT HEALTH QUESTIONNAIRE - PHQ9
SUM OF ALL RESPONSES TO PHQ QUESTIONS 1-9: 0
2. FEELING DOWN, DEPRESSED OR HOPELESS: 0
SUM OF ALL RESPONSES TO PHQ QUESTIONS 1-9: 0

## 2022-06-23 NOTE — PATIENT INSTRUCTIONS
Patient Instructions from Today's Visit    Reason for Visit:  Follow up - CLL    Diagnosis Information:  [email or web address]     Plan: Your labs look stable. Please call us if you have any questions or concerns before your next visit. Follow Up: Follow up in 4 months with Dr. Karne Morgan, with labs one week prior. Recent Lab Results:  No visits with results within 3 Day(s) from this visit.    Latest known visit with results is:   Orders Only on 05/20/2022   Component Date Value Ref Range Status    WBC 05/20/2022 8.5  4.3 - 11.1 K/uL Final    RBC 05/20/2022 4.40  4.05 - 5.2 M/uL Final    Hemoglobin 05/20/2022 9.8* 11.7 - 15.4 g/dL Final    Hematocrit 05/20/2022 32.7* 35.8 - 46.3 % Final    MCV 05/20/2022 74.3* 79.6 - 97.8 FL Final    MCH 05/20/2022 22.3* 26.1 - 32.9 PG Final    MCHC 05/20/2022 30.0* 31.4 - 35.0 g/dL Final    RDW 05/20/2022 14.6  11.9 - 14.6 % Final    Platelets 79/41/6430 255  150 - 450 K/uL Final    MPV 05/20/2022 11.3  9.4 - 12.3 FL Final    NRBC Absolute 05/20/2022 0.00  0.0 - 0.2 K/uL Final    **Note: Absolute NRBC parameter is now reported with Hemogram**    Neutrophils % 05/20/2022 62  43 - 78 % Final    Lymphocytes % 05/20/2022 26  13 - 44 % Final    Monocytes % 05/20/2022 9  4.0 - 12.0 % Final    Eosinophils % 05/20/2022 2  0.5 - 7.8 % Final    Basophils % 05/20/2022 1  0.0 - 2.0 % Final    Immature Granulocytes 05/20/2022 0  0.0 - 5.0 % Final    Neutrophils Absolute 05/20/2022 5.2  1.7 - 8.2 K/UL Final    Lymphocytes Absolute 05/20/2022 2.2  0.5 - 4.6 K/UL Final    Monocytes Absolute 05/20/2022 0.8  0.1 - 1.3 K/UL Final    Eosinophils Absolute 05/20/2022 0.2  0.0 - 0.8 K/UL Final    Basophils Absolute 05/20/2022 0.1  0.0 - 0.2 K/UL Final    GRANULOCYTE ABSOLUTE COUNT 05/20/2022 0.0  0.0 - 0.5 K/UL Final    Differential Type 05/20/2022 AUTOMATED    Final    Sodium 05/20/2022 140  136 answering service is available and will contact a provider for emergencies or if you are experiencing any of the above symptoms.  Patient does express an interest in My Chart. My Chart log in information explained on the after visit summary printout at the [facility] [place] [address] desk.     Donavan Mckay RN

## 2022-06-23 NOTE — PROGRESS NOTES
HEMATOLOGY/ONCOLOGY FOLLOWUP  VISIT      Patient Name: Mariano Pena             Date of Visit: 2022  : 1960  Age:62 y.o. Presenting Complaint:  Mariano Pena  is seen in follow-up for CML. History of Present Illness:  Ms. Nat Brownlee was seen for the first time in 2016. She was referred for evaluation of an elevated white count. Ms. Maribell Etienne is a woman of  Generally good health. She has a variety well compensated medical problems including beta+ thalassemia, degenerative joint disease, hypothyroidism, diverticulitis and chronic pain syndrome. She has been seen by pain management and has undergone a variety of interventions for varied discomforts. She stated that she had undergone a routine CBC some time in 2015. Her understanding is that this CBC was normal.  On 2016, she was evaluated for generalized achiness principally in her back, this prompted a CBC and sedimentation rate. The CBC was notable for a white count of 22,200 with a hematocrit of 34.2 and an MCV of 68. Her platelet count was 273,319. The automated differential revealed 78% granulocytes 20% lymphocytes 2% monocytes. Her sedimentation rate was 28. Other than for her discomfort, the patient indicated that she felt well. She denied any poppy swelling. The peripheral flow cytometry was normal as was the LIBBY-2 kinase assay. Her BCR-ABL analysis however was notable for mutated transcripts in 81% of circulating cells. She began treatment with imatinib in 2016. Her first BCR-ABL quantitation following the initiation of imatinib showed a greater than 3 log reduction in her CML burden. She shortly thereafter went into a complete molecular remission. She undergoes routine assessments of BCR-ABL. In 2020, after a prolonged period of a complete molecular remission she was found to have BCR-ABL transcripts at  0.0469%.  She had been taking imatinib 5-6 times per week due to diarrhea at that time. She was told to take the medication daily again and to return in two months. Subsequent to that, her BCR-ABL quantitation returned to normal and had remained so with the exception of a slight blip a few months ago for unclear reasons which again normalized. She returns today for follow-up. In the interval since last seen, her mother  of myelodysplasia. Eloy Mcguire had been in the primary caregiver for her. She has chronic back pain which was exacerbated from the physical needs associated with taking care of her mother. From the standpoint of her CML she has been doing well. She takes the imatinib faithfully and has not missed a dose. Her BCR-ABL quantitation was 0 coming into today's visit based on the lab study done approximately 1 week ago. She has no headache and no focal neurologic complaints. She has no fever, night sweats or other constitutional symptoms. She has no diarrhea and no vomiting. She has no fluid retention of any significance. Medications:   Current Outpatient Medications   Medication Sig Dispense Refill    imatinib (GLEEVEC) 400 MG chemo tablet Take 1 tablet by mouth daily TAKE 1 TABLET BY MOUTH ONCE DAILY WITH FOOD AND WATER. MAY CAUSE NAUSEA, VOMITING, MUSCLE PAIN AND EDEMA. AVOID GRAPEFRUIT.  30 tablet 5    vitamin D (CHOLECALCIFEROL) 25 MCG (1000 UT) TABS tablet Take 5,000 Units by mouth daily      vitamin D 25 MCG (1000 UT) CAPS Take 5,000 Units by mouth daily      clobetasol (TEMOVATE) 0.05 % cream Apply topically 3 times daily      diphenhydrAMINE (BENADRYL) 25 MG capsule Take 25 mg by mouth      conjugated estrogens (PREMARIN) 0.625 MG/GM vaginal cream Place 0.5 g vaginally Twice a Week      fluticasone (FLONASE) 50 MCG/ACT nasal spray 2 sprays by Nasal route daily      latanoprost (XALATAN) 0.005 % ophthalmic solution Apply 1 drop to eye      levothyroxine (SYNTHROID) 150 MCG tablet TAKE ONE TABLET BY MOUTH EVERY DAY BEFORE BREAKFAST      melatonin 1 MG tablet Take by mouth      traMADol (ULTRAM) 50 MG tablet Take 50 mg by mouth every 6 hours as needed.  valACYclovir (VALTREX) 500 MG tablet Take 1,000 mg by mouth as needed       No current facility-administered medications for this visit. Oral Chemotherapy Adherence:     Current Regimen:  Drug Name: Imatinib  Dose: 400 mg  Frequency: Daily    Barriers to care identified including (financial, physical, psychosocial) : No    Missed doses reported: No    Patient verbalizes understanding of what to do in the event of a missed dose: Yes    Adverse reactions/toxicities reported:None              Allergies: Allergies   Allergen Reactions    Latex Other (See Comments)     Respiratory issues    Hydrocodone-Acetaminophen Nausea And Vomiting     Name brand lortab ok; allergic to generic only    Iodine Rash    Meperidine Nausea And Vomiting       Review of Systems: The Review of Systems is documented in full in the internal medical record. All systems are negative other than for those noted above. Past Medical History:  Documented in electronic medical record    Past Surgical History:  Documented in electronic medical record    Social History:  Documented in electronic medical record    Family History:  Documented in electronic medical record      Physical Examination:  General Appearance: Healthy appearing patient in no acute distress. Vital signs: /79   Pulse 84   Temp 98.9 °F (37.2 °C)   Resp 16   Wt 193 lb (87.5 kg)   SpO2 99%   BMI 32.62 kg/m²     Performance status: ECOG Level: 1  Distress  Screening Score: PHQ-9 Total Score: 0 (6/23/2022  8:58 AM)  Pain Score:   0 - No pain    HEENT: No oral exam performed  Neck: Supple. There is no thyromegaly. Lymph nodes: There is no cervical, supraclavicular, axillary or inguinal adenopathy. Breasts: Not examined. Lungs: The lungs are clear to auscultation and percussion. There is no egophony.  There is no chest wall tenderness and no  use of accessory respiratory musculature. Heart: There is no jugular venous distention. The rate is normal and rhythm regular. The S1 and S2 are normal and there are no murmurs or rubs. Abdomen: Soft, non-tender, bowel sounds present and normal, no appreciated hepatosplenomegaly. No palpable masses. Skin: No rash, petechiae or ecchymoses. No evidence of malignancy. Extremities: No cyanosis, clubbing or edema. Labs/Imaging:  Lab Results   Component Value Date    WBC 8.5 05/20/2022    HGB 9.8 05/20/2022    HCT 32.7 05/20/2022     05/20/2022    MCV 74.3 05/20/2022       Lab Results   Component Value Date     05/20/2022    K 4.0 05/20/2022     05/20/2022    CO2 26 05/20/2022    BUN 12 05/20/2022    GFRAA >60 05/20/2022    GLOB 3.3 05/20/2022    ALT 32 05/20/2022       Above results reviewed with patient. ASSESSMENT:  CML in a major molecular remission. There is no measurable BCR-ABL on imatinib 400 mg daily which she takes faithfully. The option of following her without therapy has once again come up today. She would prefer to put this off until her next visit. I do have some misgivings about considering this given the minor spikes and BCR-ABL quantitation seen in the past although these anomalies been short-lived and could be a reflection of lab variability as much is anything else. PLAN:  She wishes to continue with imatinib today at 400 mg/day until she sees me again next time. I will follow-up with her in approximately 4 months to once again review the results of her BCR-ABL quantitation. I believe she will want talk again about suspending therapy at that time. I have told her that I am comfortable either way, especially if she is tolerating the medication without any difficulty.         RESUSCITATION DIRECTIVES/HOSPICE CARE;  Full Support           Palma Lamar MD FACP    Oncology and Hematology   [place]  [address] [phone]  F [phone]  [email or web address]

## 2022-09-27 ENCOUNTER — TELEPHONE (OUTPATIENT)
Dept: ONCOLOGY | Age: 62
End: 2022-09-27

## 2022-09-27 NOTE — TELEPHONE ENCOUNTER
Received fax from [facility] stating they have made several attempts to contact patient to fill her imatinib, tried to call pt no answer lvm.

## 2022-10-20 ENCOUNTER — HOSPITAL ENCOUNTER (OUTPATIENT)
Dept: LAB | Age: 62
Discharge: HOME | End: 2022-10-23
Payer: MEDICARE

## 2022-10-20 DIAGNOSIS — C92.10 CML (CHRONIC MYELOID LEUKEMIA) (HCC): ICD-10-CM

## 2022-10-20 LAB
ALBUMIN SERPL-MCNC: 3.7 G/DL (ref 3.2–4.6)
ALBUMIN/GLOB SERPL: 1.1 {RATIO} (ref 0.4–1.6)
ALP SERPL-CCNC: 79 U/L (ref 50–136)
ALT SERPL-CCNC: 22 U/L (ref 12–65)
ANION GAP SERPL CALC-SCNC: 6 MMOL/L (ref 2–11)
AST SERPL-CCNC: 13 U/L (ref 15–37)
BASOPHILS # BLD: 0.1 K/UL (ref 0–0.2)
BASOPHILS NFR BLD: 1 % (ref 0–2)
BILIRUB SERPL-MCNC: 0.5 MG/DL (ref 0.2–1.1)
BUN SERPL-MCNC: 10 MG/DL (ref 8–23)
CALCIUM SERPL-MCNC: 9.3 MG/DL (ref 8.3–10.4)
CHLORIDE SERPL-SCNC: 109 MMOL/L (ref 101–110)
CO2 SERPL-SCNC: 26 MMOL/L (ref 21–32)
CREAT SERPL-MCNC: 0.7 MG/DL (ref 0.6–1)
DIFFERENTIAL METHOD BLD: ABNORMAL
EOSINOPHIL # BLD: 0.3 K/UL (ref 0–0.8)
EOSINOPHIL NFR BLD: 4 % (ref 0.5–7.8)
ERYTHROCYTE [DISTWIDTH] IN BLOOD BY AUTOMATED COUNT: 15 % (ref 11.9–14.6)
GLOBULIN SER CALC-MCNC: 3.5 G/DL (ref 2.8–4.5)
GLUCOSE SERPL-MCNC: 138 MG/DL (ref 65–100)
HCT VFR BLD AUTO: 32.9 % (ref 35.8–46.3)
HGB BLD-MCNC: 9.8 G/DL (ref 11.7–15.4)
IMM GRANULOCYTES # BLD AUTO: 0 K/UL (ref 0–0.5)
IMM GRANULOCYTES NFR BLD AUTO: 0 % (ref 0–5)
LYMPHOCYTES # BLD: 2 K/UL (ref 0.5–4.6)
LYMPHOCYTES NFR BLD: 29 % (ref 13–44)
Lab: NORMAL
Lab: NORMAL
MCH RBC QN AUTO: 21.6 PG (ref 26.1–32.9)
MCHC RBC AUTO-ENTMCNC: 29.8 G/DL (ref 31.4–35)
MCV RBC AUTO: 72.5 FL (ref 82–102)
MONOCYTES # BLD: 0.8 K/UL (ref 0.1–1.3)
MONOCYTES NFR BLD: 12 % (ref 4–12)
NEUTS SEG # BLD: 3.7 K/UL (ref 1.7–8.2)
NEUTS SEG NFR BLD: 54 % (ref 43–78)
NRBC # BLD: 0 K/UL (ref 0–0.2)
PLATELET # BLD AUTO: 299 K/UL (ref 150–450)
PMV BLD AUTO: 10.5 FL (ref 9.4–12.3)
POTASSIUM SERPL-SCNC: 4.3 MMOL/L (ref 3.5–5.1)
PROT SERPL-MCNC: 7.2 G/DL (ref 6.3–8.2)
RBC # BLD AUTO: 4.54 M/UL (ref 4.05–5.2)
REFERENCE LAB: NORMAL
SODIUM SERPL-SCNC: 141 MMOL/L (ref 133–143)
WBC # BLD AUTO: 7 K/UL (ref 4.3–11.1)

## 2022-10-20 PROCEDURE — 85025 COMPLETE CBC W/AUTO DIFF WBC: CPT

## 2022-10-20 PROCEDURE — 80053 COMPREHEN METABOLIC PANEL: CPT

## 2022-10-20 PROCEDURE — 36415 COLL VENOUS BLD VENIPUNCTURE: CPT

## 2022-10-27 ENCOUNTER — OFFICE VISIT (OUTPATIENT)
Dept: ONCOLOGY | Age: 62
End: 2022-10-27
Payer: MEDICARE

## 2022-10-27 VITALS
OXYGEN SATURATION: 100 % | HEART RATE: 85 BPM | BODY MASS INDEX: 31.4 KG/M2 | HEIGHT: 65 IN | SYSTOLIC BLOOD PRESSURE: 132 MMHG | RESPIRATION RATE: 16 BRPM | DIASTOLIC BLOOD PRESSURE: 73 MMHG | WEIGHT: 188.5 LBS | TEMPERATURE: 97.9 F

## 2022-10-27 DIAGNOSIS — C92.10 CML (CHRONIC MYELOID LEUKEMIA) (HCC): Primary | ICD-10-CM

## 2022-10-27 PROCEDURE — 99213 OFFICE O/P EST LOW 20 MIN: CPT | Performed by: INTERNAL MEDICINE

## 2022-10-27 RX ORDER — METHYLPREDNISOLONE 4 MG/1
TABLET ORAL
COMMUNITY
Start: 2022-10-21

## 2022-10-27 RX ORDER — LEVOTHYROXINE SODIUM 0.12 MG/1
TABLET ORAL
COMMUNITY
Start: 2022-10-07

## 2022-10-27 ASSESSMENT — PATIENT HEALTH QUESTIONNAIRE - PHQ9
SUM OF ALL RESPONSES TO PHQ QUESTIONS 1-9: 0
1. LITTLE INTEREST OR PLEASURE IN DOING THINGS: 0
2. FEELING DOWN, DEPRESSED OR HOPELESS: 0
SUM OF ALL RESPONSES TO PHQ9 QUESTIONS 1 & 2: 0
SUM OF ALL RESPONSES TO PHQ QUESTIONS 1-9: 0

## 2022-10-27 NOTE — PATIENT INSTRUCTIONS
Patient Instructions from Today's Visit    Reason for Visit:  Follow up-CML    Diagnosis Information:  [email or web address]      Plan:  All of your labs aren't back yet at this time, but we will reach out to you if anything is abnormal.    Follow Up:  Dr Michael Hayes in 4 months    Recent Lab Results:  No visits with results within 3 Day(s) from this visit.    Latest known visit with results is:   Hospital Outpatient Visit on 10/20/2022   Component Date Value Ref Range Status    WBC 10/20/2022 7.0  4.3 - 11.1 K/uL Final    RBC 10/20/2022 4.54  4.05 - 5.2 M/uL Final    Hemoglobin 10/20/2022 9.8 (A)  11.7 - 15.4 g/dL Final    Hematocrit 10/20/2022 32.9 (A)  35.8 - 46.3 % Final    MCV 10/20/2022 72.5 (A)  82.0 - 102.0 FL Final    MCH 10/20/2022 21.6 (A)  26.1 - 32.9 PG Final    MCHC 10/20/2022 29.8 (A)  31.4 - 35.0 g/dL Final    RDW 10/20/2022 15.0 (A)  11.9 - 14.6 % Final    Platelets 44/16/3415 299  150 - 450 K/uL Final    MPV 10/20/2022 10.5  9.4 - 12.3 FL Final    nRBC 10/20/2022 0.00  0.0 - 0.2 K/uL Final    **Note: Absolute NRBC parameter is now reported with Hemogram**    Differential Type 10/20/2022 AUTOMATED    Final    Seg Neutrophils 10/20/2022 54  43 - 78 % Final    Lymphocytes 10/20/2022 29  13 - 44 % Final    Monocytes 10/20/2022 12  4.0 - 12.0 % Final    Eosinophils % 10/20/2022 4  0.5 - 7.8 % Final    Basophils 10/20/2022 1  0.0 - 2.0 % Final    Immature Granulocytes 10/20/2022 0  0.0 - 5.0 % Final    Segs Absolute 10/20/2022 3.7  1.7 - 8.2 K/UL Final    Absolute Lymph # 10/20/2022 2.0  0.5 - 4.6 K/UL Final    Absolute Mono # 10/20/2022 0.8  0.1 - 1.3 K/UL Final    Absolute Eos # 10/20/2022 0.3  0.0 - 0.8 K/UL Final    Basophils Absolute 10/20/2022 0.1  0.0 - 0.2 K/UL Final    Absolute Immature Granulocyte 10/20/2022 0.0  0.0 - 0.5 K/UL Final    Sodium 10/20/2022 141  133 - 143 mmol/L Final    Potassium 10/20/2022 4.3  3.5 - 5.1 mmol/L breath  Swelling or pain in one leg    After office hours an answering service is available and will contact a provider for emergencies or if you are experiencing any of the above symptoms. Patient does express an interest in My Chart. My Chart log in information explained on the after visit summary printout at the [facility] [place] [address] desk.     Ronen Garg MA

## 2022-10-27 NOTE — PROGRESS NOTES
HEMATOLOGY/ONCOLOGY FOLLOWUP  VISIT      Patient Name: Raul Carballo             Date of Visit: 10/27/2022  : 1960  Age:62 y.o. Presenting Complaint:  Raul Carballo  is seen in follow-up for CML. History of Present Illness:  Ms. Ailyn Mccarty was seen for the first time in 2016. She was referred for evaluation of an elevated white count. Ms. Imani Cruz is a woman of  Generally good health. She has a variety well compensated medical problems including beta+ thalassemia, degenerative joint disease, hypothyroidism, diverticulitis and chronic pain syndrome. She has been seen by pain management and has undergone a variety of interventions for varied discomforts. She stated that she had undergone a routine CBC some time in 2015. Her understanding is that this CBC was normal.  On 2016, she was evaluated for generalized achiness principally in her back, this prompted a CBC and sedimentation rate. The CBC was notable for a white count of 22,200 with a hematocrit of 34.2 and an MCV of 68. Her platelet count was 570,812. The automated differential revealed 78% granulocytes 20% lymphocytes 2% monocytes. Her sedimentation rate was 28. Other than for her discomfort, the patient indicated that she felt well. She denied any poppy swelling. The peripheral flow cytometry was normal as was the LIBBY-2 kinase assay. Her BCR-ABL analysis however was notable for mutated transcripts in 81% of circulating cells. She began treatment with imatinib in 2016. Her first BCR-ABL quantitation following the initiation of imatinib showed a greater than 3 log reduction in her CML burden. She shortly thereafter went into a complete molecular remission. She undergoes routine assessments of BCR-ABL. In 2020, after a prolonged period of a complete molecular remission she was found to have BCR-ABL transcripts at  0.0469%.  She had been taking imatinib 5-6 times per week due to diarrhea at that time. She was told to take the medication daily again and to return in two months. Subsequent to that, her BCR-ABL quantitation returned to normal and had remained so with the exception of a slight blip a few months ago for unclear reasons which again normalized. She returns today for follow-up. Since last seen, she has been relatively well. At her last visit with us, she was somewhat ambivalent about continuing to take the imatinib given her major molecular response. When we concluded that visit she decided to stay on therapy but in August 2022 she developed COVID with rather prominent gastrointestinal symptoms with nausea and vomiting. She could not take her imatinib during that period of time and then ultimately decided to suspend it since that had been part of her consideration. Since that time, she is felt well. She notes a bit more energy. She has had no fever, night sweats or other constitutional symptoms. There is no abdominal distention or discomfort. She has no cough or shortness of breath. Unfortunately, her BCR-ABL quantitation is still pending today despite it having been drawn last week. 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     Medications:   Current Outpatient Medications   Medication Sig Dispense Refill    levothyroxine (SYNTHROID) 125 MCG tablet TAKE 1 TABLET BY MOUTH EVERY DAY      methylPREDNISolone (MEDROL DOSEPACK) 4 MG tablet TAKE 6 TABLETS ON DAY 1 AS DIRECTED ON PACKAGE AND DECREASE BY 1 TAB EACH DAY FOR A TOTAL OF 6 DAYS      vitamin D (CHOLECALCIFEROL) 25 MCG (1000 UT) TABS tablet Take 5,000 Units by mouth daily      vitamin D 25 MCG (1000 UT) CAPS Take 5,000 Units by mouth daily      clobetasol (TEMOVATE) 0.05 % cream Apply topically 3 times daily      diphenhydrAMINE (BENADRYL) 25 MG capsule Take 25 mg by mouth      fluticasone (FLONASE) 50 MCG/ACT nasal spray 2 sprays by Nasal route daily      latanoprost (XALATAN) 0.005 % ophthalmic solution Apply 1 drop to eye      melatonin 1 MG tablet Take 10 mg by mouth nightly as needed      traMADol (ULTRAM) 50 MG tablet Take 50 mg by mouth every 6 hours as needed. valACYclovir (VALTREX) 500 MG tablet Take 1,000 mg by mouth as needed      imatinib (GLEEVEC) 400 MG chemo tablet Take 1 tablet by mouth daily TAKE 1 TABLET BY MOUTH ONCE DAILY WITH FOOD AND WATER. MAY CAUSE NAUSEA, VOMITING, MUSCLE PAIN AND EDEMA. AVOID GRAPEFRUIT. (Patient not taking: Reported on 10/27/2022) 30 tablet 5    conjugated estrogens (PREMARIN) 0.625 MG/GM vaginal cream Place 0.5 g vaginally Twice a Week (Patient not taking: Reported on 10/27/2022)       No current facility-administered medications for this visit. Allergies: Allergies   Allergen Reactions    Latex Other (See Comments)     Respiratory issues    Hydrocodone-Acetaminophen Nausea And Vomiting     Name brand lortab ok; allergic to generic only    Iodine Rash    Meperidine Nausea And Vomiting       Review of Systems: The Review of Systems is documented in full in the internal medical record. All systems are negative other than for those noted above. Past Medical History:  Documented in electronic medical record    Past Surgical History:  Documented in electronic medical record    Social History:  Documented in electronic medical record    Family History:  Documented in electronic medical record      Physical Examination:  General Appearance: Healthy appearing patient in no acute distress. Vital signs: /73 Comment: sitting  Pulse 85   Temp 97.9 °F (36.6 °C) (Oral)   Resp 16   Ht 5' 4.5\" (1.638 m)   Wt 188 lb 8 oz (85.5 kg)   SpO2 100%   BMI 31.86 kg/m²     Performance status: ECOG Level: 1  Distress  Screening Score: PHQ-9 Total Score: 0 (10/27/2022  8:43 AM)  Pain Score:   2 (fatigue-1)    HEENT: No oral exam performed  Neck: Supple. There is no thyromegaly. Lymph nodes: There is no cervical, supraclavicular, axillary or inguinal adenopathy. Breasts: Not examined. Lungs: The lungs are clear to auscultation and percussion. There is no egophony. There is no chest wall tenderness and no  use of accessory respiratory musculature. Heart: There is no jugular venous distention. The rate is normal and rhythm regular. The S1 and S2 are normal and there are no murmurs or rubs. Abdomen: Soft, non-tender, bowel sounds present and normal, no appreciated hepatosplenomegaly. No palpable masses. Skin: No rash, petechiae or ecchymoses. No evidence of malignancy. Extremities: No cyanosis, clubbing or edema. Labs/Imaging:  Lab Results   Component Value Date/Time    WBC 7.0 10/20/2022 09:18 AM    HGB 9.8 10/20/2022 09:18 AM    HCT 32.9 10/20/2022 09:18 AM     10/20/2022 09:18 AM    MCV 72.5 10/20/2022 09:18 AM       Lab Results   Component Value Date/Time     10/20/2022 09:18 AM    K 4.3 10/20/2022 09:18 AM     10/20/2022 09:18 AM    CO2 26 10/20/2022 09:18 AM    BUN 10 10/20/2022 09:18 AM    GFRAA >60 05/20/2022 12:51 PM    GLOB 3.5 10/20/2022 09:18 AM    ALT 22 10/20/2022 09:18 AM       Above results reviewed with patient. ASSESSMENT:  CML in a major molecular remission. She stopped therapy in August 2022 and is now being monitored. Unfortunately, her BCR-ABL quantitation from today is still pending. Hematologically however she seems to be doing well. PLAN:  We await the results of her quantitation. If she remains in a major molecular remission she will return in 4 months to see Dr. Hany Machado. Otherwise, if there is a need to intervene she and I will have that discussion before my senior care.       RESUSCITATION DIRECTIVES/HOSPICE CARE;  Full Support           Tia Marin MD [facility]    Oncology and Hematology   [address] [phone] W [phone]  [email or web address]

## 2022-12-27 NOTE — PROGRESS NOTES
\"The prescription for IMATINIB MES TAB 400MG has been set up for shipment with a delivery date of 12/29 and a copay of $0.   Please let me know if you have any questions/concerns.     Thanks,    ____________________    [facility] Coordinator, [facility]  [facility] [employer] \"

## 2023-01-19 NOTE — PROGRESS NOTES
Per Iris from [facility]    \"The prescription for IMATINIB MES TAB 400MG is ready to be set up for shipment with a co-pay of $0.   Please let me know if you have any questions/concerns. \"

## 2023-02-20 ENCOUNTER — HOSPITAL ENCOUNTER (OUTPATIENT)
Dept: LAB | Age: 63
Discharge: HOME | End: 2023-02-23
Payer: MEDICARE

## 2023-02-20 DIAGNOSIS — C92.10 CML (CHRONIC MYELOID LEUKEMIA) (HCC): ICD-10-CM

## 2023-02-20 LAB
ALBUMIN SERPL-MCNC: 4.2 G/DL (ref 3.2–4.6)
ALBUMIN/GLOB SERPL: 1.3 (ref 0.4–1.6)
ALP SERPL-CCNC: 80 U/L (ref 50–136)
ALT SERPL-CCNC: 29 U/L (ref 12–65)
ANION GAP SERPL CALC-SCNC: 5 MMOL/L (ref 2–11)
AST SERPL-CCNC: 26 U/L (ref 15–37)
BASOPHILS # BLD: 0.1 K/UL (ref 0–0.2)
BASOPHILS NFR BLD: 1 % (ref 0–2)
BILIRUB SERPL-MCNC: 0.5 MG/DL (ref 0.2–1.1)
BUN SERPL-MCNC: 11 MG/DL (ref 8–23)
CALCIUM SERPL-MCNC: 9.1 MG/DL (ref 8.3–10.4)
CHLORIDE SERPL-SCNC: 106 MMOL/L (ref 101–110)
CO2 SERPL-SCNC: 29 MMOL/L (ref 21–32)
CREAT SERPL-MCNC: 0.8 MG/DL (ref 0.6–1)
DIFFERENTIAL METHOD BLD: ABNORMAL
EOSINOPHIL # BLD: 0.3 K/UL (ref 0–0.8)
EOSINOPHIL NFR BLD: 3 % (ref 0.5–7.8)
ERYTHROCYTE [DISTWIDTH] IN BLOOD BY AUTOMATED COUNT: 16.6 % (ref 11.9–14.6)
GLOBULIN SER CALC-MCNC: 3.3 G/DL (ref 2.8–4.5)
GLUCOSE SERPL-MCNC: 105 MG/DL (ref 65–100)
HCT VFR BLD AUTO: 31.5 % (ref 35.8–46.3)
HGB BLD-MCNC: 9.6 G/DL (ref 11.7–15.4)
IMM GRANULOCYTES # BLD AUTO: 0 K/UL (ref 0–0.5)
IMM GRANULOCYTES NFR BLD AUTO: 0 % (ref 0–5)
LYMPHOCYTES # BLD: 2.6 K/UL (ref 0.5–4.6)
LYMPHOCYTES NFR BLD: 33 % (ref 13–44)
MCH RBC QN AUTO: 21.8 PG (ref 26.1–32.9)
MCHC RBC AUTO-ENTMCNC: 30.5 G/DL (ref 31.4–35)
MCV RBC AUTO: 71.6 FL (ref 82–102)
MONOCYTES # BLD: 0.7 K/UL (ref 0.1–1.3)
MONOCYTES NFR BLD: 8 % (ref 4–12)
NEUTS SEG # BLD: 4.2 K/UL (ref 1.7–8.2)
NEUTS SEG NFR BLD: 55 % (ref 43–78)
NRBC # BLD: 0 K/UL (ref 0–0.2)
PLATELET # BLD AUTO: 303 K/UL (ref 150–450)
PMV BLD AUTO: 10.5 FL (ref 9.4–12.3)
POTASSIUM SERPL-SCNC: 3.8 MMOL/L (ref 3.5–5.1)
PROT SERPL-MCNC: 7.5 G/DL (ref 6.3–8.2)
RBC # BLD AUTO: 4.4 M/UL (ref 4.05–5.2)
SODIUM SERPL-SCNC: 140 MMOL/L (ref 133–143)
WBC # BLD AUTO: 7.9 K/UL (ref 4.3–11.1)

## 2023-02-20 PROCEDURE — 85025 COMPLETE CBC W/AUTO DIFF WBC: CPT

## 2023-02-20 PROCEDURE — 36415 COLL VENOUS BLD VENIPUNCTURE: CPT

## 2023-02-20 PROCEDURE — 80053 COMPREHEN METABOLIC PANEL: CPT

## 2023-02-21 LAB
Lab: NORMAL
Lab: NORMAL
REFERENCE LAB: NORMAL

## 2023-03-02 DIAGNOSIS — C92.10 CML (CHRONIC MYELOID LEUKEMIA) (HCC): Primary | ICD-10-CM

## 2023-03-07 ENCOUNTER — OFFICE VISIT (OUTPATIENT)
Dept: ONCOLOGY | Age: 63
End: 2023-03-07
Payer: MEDICARE

## 2023-03-07 ENCOUNTER — HOSPITAL ENCOUNTER (OUTPATIENT)
Dept: LAB | Age: 63
Discharge: HOME | End: 2023-03-10
Payer: MEDICARE

## 2023-03-07 VITALS
TEMPERATURE: 98.6 F | RESPIRATION RATE: 18 BRPM | HEART RATE: 72 BPM | SYSTOLIC BLOOD PRESSURE: 146 MMHG | BODY MASS INDEX: 31.84 KG/M2 | WEIGHT: 191.1 LBS | DIASTOLIC BLOOD PRESSURE: 88 MMHG | OXYGEN SATURATION: 97 % | HEIGHT: 65 IN

## 2023-03-07 DIAGNOSIS — C92.10 CML (CHRONIC MYELOID LEUKEMIA) (HCC): ICD-10-CM

## 2023-03-07 DIAGNOSIS — R68.89 OTHER GENERAL SYMPTOMS AND SIGNS: ICD-10-CM

## 2023-03-07 LAB
ALBUMIN SERPL-MCNC: 4 G/DL (ref 3.2–4.6)
ALBUMIN/GLOB SERPL: 1.2 (ref 0.4–1.6)
ALP SERPL-CCNC: 82 U/L (ref 50–136)
ALT SERPL-CCNC: 27 U/L (ref 12–65)
ANION GAP SERPL CALC-SCNC: 2 MMOL/L (ref 2–11)
AST SERPL-CCNC: 28 U/L (ref 15–37)
BASOPHILS # BLD: 0.1 K/UL (ref 0–0.2)
BASOPHILS NFR BLD: 1 % (ref 0–2)
BILIRUB SERPL-MCNC: 0.5 MG/DL (ref 0.2–1.1)
BUN SERPL-MCNC: 12 MG/DL (ref 8–23)
CALCIUM SERPL-MCNC: 9.3 MG/DL (ref 8.3–10.4)
CHLORIDE SERPL-SCNC: 109 MMOL/L (ref 101–110)
CO2 SERPL-SCNC: 31 MMOL/L (ref 21–32)
CREAT SERPL-MCNC: 0.9 MG/DL (ref 0.6–1)
DIFFERENTIAL METHOD BLD: ABNORMAL
EOSINOPHIL # BLD: 0.3 K/UL (ref 0–0.8)
EOSINOPHIL NFR BLD: 4 % (ref 0.5–7.8)
ERYTHROCYTE [DISTWIDTH] IN BLOOD BY AUTOMATED COUNT: 16.8 % (ref 11.9–14.6)
GLOBULIN SER CALC-MCNC: 3.4 G/DL (ref 2.8–4.5)
GLUCOSE SERPL-MCNC: 82 MG/DL (ref 65–100)
HCT VFR BLD AUTO: 29.5 % (ref 35.8–46.3)
HGB BLD-MCNC: 9.2 G/DL (ref 11.7–15.4)
IMM GRANULOCYTES # BLD AUTO: 0 K/UL (ref 0–0.5)
IMM GRANULOCYTES NFR BLD AUTO: 0 % (ref 0–5)
LYMPHOCYTES # BLD: 2.3 K/UL (ref 0.5–4.6)
LYMPHOCYTES NFR BLD: 30 % (ref 13–44)
MCH RBC QN AUTO: 22.2 PG (ref 26.1–32.9)
MCHC RBC AUTO-ENTMCNC: 31.2 G/DL (ref 31.4–35)
MCV RBC AUTO: 71.1 FL (ref 82–102)
MONOCYTES # BLD: 0.8 K/UL (ref 0.1–1.3)
MONOCYTES NFR BLD: 11 % (ref 4–12)
NEUTS SEG # BLD: 4.1 K/UL (ref 1.7–8.2)
NEUTS SEG NFR BLD: 54 % (ref 43–78)
NRBC # BLD: 0 K/UL (ref 0–0.2)
PLATELET # BLD AUTO: 291 K/UL (ref 150–450)
PMV BLD AUTO: 10.7 FL (ref 9.4–12.3)
POTASSIUM SERPL-SCNC: 3.7 MMOL/L (ref 3.5–5.1)
PROT SERPL-MCNC: 7.4 G/DL (ref 6.3–8.2)
RBC # BLD AUTO: 4.15 M/UL (ref 4.05–5.2)
SODIUM SERPL-SCNC: 142 MMOL/L (ref 133–143)
WBC # BLD AUTO: 7.5 K/UL (ref 4.3–11.1)

## 2023-03-07 PROCEDURE — 99214 OFFICE O/P EST MOD 30 MIN: CPT | Performed by: INTERNAL MEDICINE

## 2023-03-07 PROCEDURE — 85025 COMPLETE CBC W/AUTO DIFF WBC: CPT

## 2023-03-07 PROCEDURE — 80053 COMPREHEN METABOLIC PANEL: CPT

## 2023-03-07 PROCEDURE — 36415 COLL VENOUS BLD VENIPUNCTURE: CPT

## 2023-03-07 PROCEDURE — 82607 VITAMIN B-12: CPT

## 2023-03-07 RX ORDER — LEVOCETIRIZINE DIHYDROCHLORIDE 5 MG/1
5 TABLET, FILM COATED ORAL NIGHTLY
COMMUNITY

## 2023-03-07 ASSESSMENT — PATIENT HEALTH QUESTIONNAIRE - PHQ9
2. FEELING DOWN, DEPRESSED OR HOPELESS: 0
SUM OF ALL RESPONSES TO PHQ QUESTIONS 1-9: 0
SUM OF ALL RESPONSES TO PHQ QUESTIONS 1-9: 0
1. LITTLE INTEREST OR PLEASURE IN DOING THINGS: 0
SUM OF ALL RESPONSES TO PHQ QUESTIONS 1-9: 0
SUM OF ALL RESPONSES TO PHQ QUESTIONS 1-9: 0
SUM OF ALL RESPONSES TO PHQ9 QUESTIONS 1 & 2: 0

## 2023-03-07 NOTE — PATIENT INSTRUCTIONS
Patient Instructions from Today's Visit    Reason for Visit:  Follow up - CML    Diagnosis Information:  https://www.[employer]/. net/about-us/asco-answers-patient-education-materials/atqc-pnldjiv-lozf-sheets    Plan: You informed us that you restarted Imatinib in December. Avoid antacids with Imatinib as this could impact absorption of medication. Continue to take as prescribed. Continue B12. You have informed us that you have beta thalassemia trait - this explains your anemia. Please call us if you have any questions or concerns before your next visit. Follow Up: Follow up in 3 months with Dr. Lulu Nagel, with labs prior. Recent Lab Results:  No visits with results within 3 Day(s) from this visit.    Latest known visit with results is:   Hospital Outpatient Visit on 02/20/2023   Component Date Value Ref Range Status    WBC 02/20/2023 7.9  4.3 - 11.1 K/uL Final    RBC 02/20/2023 4.40  4.05 - 5.2 M/uL Final    Hemoglobin 02/20/2023 9.6 (A)  11.7 - 15.4 g/dL Final    Hematocrit 02/20/2023 31.5 (A)  35.8 - 46.3 % Final    MCV 02/20/2023 71.6 (A)  82.0 - 102.0 FL Final    MCH 02/20/2023 21.8 (A)  26.1 - 32.9 PG Final    MCHC 02/20/2023 30.5 (A)  31.4 - 35.0 g/dL Final    RDW 02/20/2023 16.6 (A)  11.9 - 14.6 % Final    Platelets 17/22/6216 303  150 - 450 K/uL Final    MPV 02/20/2023 10.5  9.4 - 12.3 FL Final    nRBC 02/20/2023 0.00  0.0 - 0.2 K/uL Final    **Note: Absolute NRBC parameter is now reported with Hemogram**    Seg Neutrophils 02/20/2023 55  43 - 78 % Final    Lymphocytes 02/20/2023 33  13 - 44 % Final    Monocytes 02/20/2023 8  4.0 - 12.0 % Final    Eosinophils % 02/20/2023 3  0.5 - 7.8 % Final    Basophils 02/20/2023 1  0.0 - 2.0 % Final    Immature Granulocytes 02/20/2023 0  0.0 - 5.0 % Final    Segs Absolute 02/20/2023 4.2  1.7 - 8.2 K/UL Final    Absolute Lymph # 02/20/2023 2.6  0.5 - 4.6 K/UL Final    Absolute Mono # 02/20/2023 0.7  0.1 - 1.3 K/UL Final    Absolute Eos # 02/20/2023 0.3  0.0 - 0.8 K/UL Final    Basophils Absolute 02/20/2023 0.1  0.0 - 0.2 K/UL Final    Absolute Immature Granulocyte 02/20/2023 0.0  0.0 - 0.5 K/UL Final    Differential Type 02/20/2023 AUTOMATED    Final    Sodium 02/20/2023 140  133 - 143 mmol/L Final    Potassium 02/20/2023 3.8  3.5 - 5.1 mmol/L Final    Chloride 02/20/2023 106  101 - 110 mmol/L Final    CO2 02/20/2023 29  21 - 32 mmol/L Final    Anion Gap 02/20/2023 5  2 - 11 mmol/L Final    Glucose 02/20/2023 105 (A)  65 - 100 mg/dL Final    BUN 02/20/2023 11  8 - 23 MG/DL Final    Creatinine 02/20/2023 0.80  0.6 - 1.0 MG/DL Final    Est, Glom Filt Rate 02/20/2023 >60  >60 ml/min/1.73m2 Final    Comment:      Pediatric calculator link: Jeremie.at. org/professionals/kdoqi/gfr_calculatorped       These results are not intended for use in patients <25years of age. eGFR results are calculated without a race factor using  the 2021 CKD-EPI equation. Careful clinical correlation is recommended, particularly when comparing to results calculated using previous equations. The CKD-EPI equation is less accurate in patients with extremes of muscle mass, extra-renal metabolism of creatinine, excessive creatine ingestion, or following therapy that affects renal tubular secretion.       Calcium 02/20/2023 9.1  8.3 - 10.4 MG/DL Final    Total Bilirubin 02/20/2023 0.5  0.2 - 1.1 MG/DL Final    ALT 02/20/2023 29  12 - 65 U/L Final    AST 02/20/2023 26  15 - 37 U/L Final    Alk Phosphatase 02/20/2023 80  50 - 136 U/L Final    Total Protein 02/20/2023 7.5  6.3 - 8.2 g/dL Final    Albumin 02/20/2023 4.2  3.2 - 4.6 g/dL Final    Globulin 02/20/2023 3.3  2.8 - 4.5 g/dL Final    Albumin/Globulin Ratio 02/20/2023 1.3  0.4 - 1.6   Final    Test Description: 02/20/2023 BCR   Final    ABL STANDARD P210,P190    Reference Lab 02/20/2023 SEND TO [employer]   Final    Results: 02/20/2023 COLLECT FOR [facility]   Final         Treatment Summary has been discussed and given to patient: N/A        -------------------------------------------------------------------------------------------------------------------  Please call our office at [phone] if you have any  of the following symptoms:   Fever of 100.5 or greater  Chills  Shortness of breath  Swelling or pain in one leg    After office hours an answering service is available and will contact a provider for emergencies or if you are experiencing any of the above symptoms. Patient does express an interest in My Chart. My Chart log in information explained on the after visit summary printout at the [address] desk.     Elda Rosales RN

## 2023-03-07 NOTE — PROGRESS NOTES
[facility] Hematology and Oncology: Office Visit Established Patient    Reason for follow up:    Ziggy Howard  is seen in follow-up for CML. Transfer of care from Dr. Karely Lux       Overview: (copied from prior)  Ms. Wero Mujica was seen for the first time in February 2016. She was referred for evaluation of an elevated white count. Ms. Lila Zambrano is a woman of  Generally good health. She has a variety well compensated medical problems including beta+ thalassemia, degenerative joint disease, hypothyroidism, diverticulitis and chronic pain syndrome. She has been seen by pain management and has undergone a variety of interventions for varied discomforts. She stated that she had undergone a routine CBC some time in June 2015. Her understanding is that this CBC was normal.  On February 2, 2016, she was evaluated for generalized achiness principally in her back, this prompted a CBC and sedimentation rate. The CBC was notable for a white count of 22,200 with a hematocrit of 34.2 and an MCV of 68. Her platelet count was 083,234. The automated differential revealed 78% granulocytes 20% lymphocytes 2% monocytes. Her sedimentation rate was 28. Other than for her discomfort, the patient indicated that she felt well. She denied any poppy swelling. The peripheral flow cytometry was normal as was the LIBBY-2 kinase assay. Her BCR-ABL analysis however was notable for mutated transcripts in 81% of circulating cells. She began treatment with imatinib in April 2016. Her first BCR-ABL quantitation following the initiation of imatinib showed a greater than 3 log reduction in her CML burden. She shortly thereafter went into a complete molecular remission. She undergoes routine assessments of BCR-ABL. In February 2020, after a prolonged period of a complete molecular remission she was found to have BCR-ABL transcripts at  0.0469%. She had been taking imatinib 5-6 times per week due to diarrhea at that time.  She was told to take the medication daily again and to return in two months. Subsequent to that, her BCR-ABL quantitation returned to normal and had remained so with the exception of a slight blip a few months ago for unclear reasons which again normalized. She returns today for follow-up. Since last seen, she has been relatively well. At her last visit with us, she was somewhat ambivalent about continuing to take the imatinib given her major molecular response. When we concluded that visit she decided to stay on therapy but in August 2022 she developed COVID with rather prominent gastrointestinal symptoms with nausea and vomiting. She could not take her imatinib during that period of time and then ultimately decided to suspend it since that had been part of her consideration. Since that time, she is felt well. She notes a bit more energy. She has had no fever, night sweats or other constitutional symptoms. There is no abdominal distention or discomfort. She has no cough or shortness of breath. Unfortunately, her BCR-ABL quantitation is still pending today despite it having been drawn last week. 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 Interval history:  Pt returns for follow up and toxicity evaluation. She resumed imatinib 400 mg daily in December as her BCR-ABL prescription became detectable (0.035). It was repeated on 2/20/2023 and was 0.028. Outside of mild fatigue, she feels well. She denies chest pain, unusual shortness of breath, drenching night sweats, new/unusual sites of bone pain. She suffers from chronic back pain. She denies swollen glands in the body, periorbital or leg edema. She denies diarrhea. She is taking vitamin D and B12 supplementation.       Review of Systems:  14 point ROS was negative except as per HPI      ECOG PERFORMANCE STATUS - 0-Fully active, able to carry on all pre-disease performance without restriction. Pain - /10. None/Minimal pain - not affecting QOL     Fatigue - No flowsheet data found. Distress - No flowsheet data found. Reviewed and updated this visit by provider:  Tobacco  Allergies  Meds  Problems  Med Hx  Surg Hx  Fam Hx          Current Outpatient Medications   Medication Sig Dispense Refill    cyanocobalamin 1000 MCG tablet Take by mouth daily      levocetirizine (XYZAL) 5 MG tablet Take 5 mg by mouth nightly      levothyroxine (SYNTHROID) 125 MCG tablet TAKE 1 TABLET BY MOUTH EVERY DAY      imatinib (GLEEVEC) 400 MG chemo tablet Take 1 tablet by mouth daily TAKE 1 TABLET BY MOUTH ONCE DAILY WITH FOOD AND WATER. MAY CAUSE NAUSEA, VOMITING, MUSCLE PAIN AND EDEMA. AVOID GRAPEFRUIT. 30 tablet 5    vitamin D (CHOLECALCIFEROL) 25 MCG (1000 UT) TABS tablet Take 5,000 Units by mouth daily      vitamin D 25 MCG (1000 UT) CAPS Take 5,000 Units by mouth daily      clobetasol (TEMOVATE) 0.05 % cream Apply topically 3 times daily      diphenhydrAMINE (BENADRYL) 25 MG capsule Take 25 mg by mouth      latanoprost (XALATAN) 0.005 % ophthalmic solution Apply 1 drop to eye      melatonin 1 MG tablet Take 10 mg by mouth nightly as needed      traMADol (ULTRAM) 50 MG tablet Take 50 mg by mouth every 6 hours as needed. valACYclovir (VALTREX) 500 MG tablet Take 1,000 mg by mouth as needed      methylPREDNISolone (MEDROL DOSEPACK) 4 MG tablet TAKE 6 TABLETS ON DAY 1 AS DIRECTED ON PACKAGE AND DECREASE BY 1 TAB EACH DAY FOR A TOTAL OF 6 DAYS (Patient not taking: Reported on 3/7/2023)      conjugated estrogens (PREMARIN) 0.625 MG/GM vaginal cream Place 0.5 g vaginally Twice a Week (Patient not taking: No sig reported)      fluticasone (FLONASE) 50 MCG/ACT nasal spray 2 sprays by Nasal route daily (Patient not taking: Reported on 3/7/2023)       No current facility-administered medications for this visit.         OBJECTIVE:  BP (!) 146/88   Pulse 72   Temp 98.6 °F (37 °C) (Oral) Resp 18   Ht 5' 4.5\" (1.638 m)   Wt 191 lb 1.6 oz (86.7 kg)   SpO2 97%   BMI 32.30 kg/m²   Wt Readings from Last 3 Encounters:   03/07/23 191 lb 1.6 oz (86.7 kg)   10/27/22 188 lb 8 oz (85.5 kg)   06/23/22 193 lb (87.5 kg)       Physical Exam:  Patient alert and oriented x 3, in no acute distress  Integumentary: No Pallor, no icterus  HEENT: Moist mucous membranes, normal oropharynx  Lymph nodes: No cervical residual lymphadenopathy is palpable. Cardiovascular:RRR, S1, S2 present, no m/r/g   Lungs: Clear to auscultation, no rales or wheezing, no accessory muscle use  Abdomen: Soft, and non-tender on palpation, no organomegaly, bowel sounds audible  Extremities: No peripheral edema, no calf swelling, Homans negative. Neurological: patient can follow commands and move all extremities    Labs:  Lab Results   Component Value Date    WBC 7.9 02/20/2023    HGB 9.6 (L) 02/20/2023    HCT 31.5 (L) 02/20/2023    MCV 71.6 (L) 02/20/2023     02/20/2023     Lab Results   Component Value Date    NEUTROABS 5.2 05/20/2022    LYMPHOPCT 33 02/20/2023    LYMPHSABS 2.6 02/20/2023    MONOPCT 8 02/20/2023    MONOSABS 0.7 02/20/2023    EOSABS 0.3 02/20/2023    BASOPCT 1 02/20/2023     Lab Results   Component Value Date     02/20/2023    K 3.8 02/20/2023     02/20/2023    CO2 29 02/20/2023    BUN 11 02/20/2023    CREATININE 0.80 02/20/2023    GLUCOSE 105 (H) 02/20/2023    CALCIUM 9.1 02/20/2023    PROT 7.5 02/20/2023    LABALBU 4.2 02/20/2023    BILITOT 0.5 02/20/2023    ALKPHOS 80 02/20/2023    AST 26 02/20/2023    ALT 29 02/20/2023    LABGLOM >60 02/20/2023    GFRAA >60 05/20/2022    AGRATIO 1.2 05/20/2022    GLOB 3.3 02/20/2023               Imaging:  No results found. Problems:  1. CML (chronic myeloid leukemia) ([facility] [address]    2. Other general symptoms and signs         PLAN:  Pt is clinically stable. Reviewed labs from 2/20/23.   Moderate microcytic anemia (beta thalassemia minor) is not much changed from historical values. She will continue taking imatinib 400 mg p.o. daily. Reviewed potential side effects including edema, cytopenias, nausea, vomiting or diarrhea. Reviewed potential drug interactions with PPIs and CYP inhibitors and inducers. Repeat cbc with diff today. We shall also check B12 levels. ROV in May with basic labs and BCR-ABL1 by PCR. Values from Feb 2023 are c/w MMR. Bennett Lepe MD  [address] [place] [address] Hematology and Oncology  [address] [place] [address]  Office : [phone]  Fax : [phone]    Elements of this note have been dictated using speech recognition software. As a result, errors of speech recognition may have occurred.

## 2023-03-08 ENCOUNTER — TELEPHONE (OUTPATIENT)
Dept: ONCOLOGY | Age: 63
End: 2023-03-08

## 2023-03-08 LAB — VIT B12 SERPL-MCNC: 2905 PG/ML (ref 193–986)

## 2023-03-15 NOTE — PROGRESS NOTES
Notice per [employer]    \"  Name: Martínez Malloy  : 1960  MD: ALDO    The prescription for IMATINIB MES TAB 400MG is ready to be set up for shipment with a co-pay of $0.   Please let me know if you have any questions/concerns.     Thanks,\"

## 2023-04-13 RX ORDER — IMATINIB MESYLATE 400 MG/1
TABLET, FILM COATED ORAL
Qty: 30 TABLET | Refills: 5 | OUTPATIENT
Start: 2023-04-13

## 2023-04-28 ENCOUNTER — TELEPHONE (OUTPATIENT)
Dept: ONCOLOGY | Age: 63
End: 2023-04-28

## 2023-05-02 DIAGNOSIS — C92.10 CML (CHRONIC MYELOID LEUKEMIA) (HCC): Primary | ICD-10-CM

## 2023-05-02 RX ORDER — IMATINIB MESYLATE 400 MG/1
400 TABLET, FILM COATED ORAL DAILY
Qty: 30 TABLET | Refills: 5 | Status: SHIPPED | OUTPATIENT
Start: 2023-05-02

## 2023-05-30 ENCOUNTER — HOSPITAL ENCOUNTER (OUTPATIENT)
Dept: LAB | Age: 63
Discharge: HOME | End: 2023-06-02
Payer: MEDICARE

## 2023-05-30 DIAGNOSIS — C92.10 CML (CHRONIC MYELOID LEUKEMIA) (HCC): ICD-10-CM

## 2023-05-30 LAB
ALBUMIN SERPL-MCNC: 3.8 G/DL (ref 3.2–4.6)
ALBUMIN/GLOB SERPL: 1.1 (ref 0.4–1.6)
ALP SERPL-CCNC: 81 U/L (ref 50–136)
ALT SERPL-CCNC: 24 U/L (ref 12–65)
ANION GAP SERPL CALC-SCNC: 3 MMOL/L (ref 2–11)
AST SERPL-CCNC: 17 U/L (ref 15–37)
BASOPHILS # BLD: 0.1 K/UL (ref 0–0.2)
BASOPHILS NFR BLD: 1 % (ref 0–2)
BILIRUB SERPL-MCNC: 0.5 MG/DL (ref 0.2–1.1)
BUN SERPL-MCNC: 11 MG/DL (ref 8–23)
CALCIUM SERPL-MCNC: 8.9 MG/DL (ref 8.3–10.4)
CHLORIDE SERPL-SCNC: 106 MMOL/L (ref 101–110)
CO2 SERPL-SCNC: 31 MMOL/L (ref 21–32)
CREAT SERPL-MCNC: 0.8 MG/DL (ref 0.6–1)
DIFFERENTIAL METHOD BLD: ABNORMAL
EOSINOPHIL # BLD: 0.3 K/UL (ref 0–0.8)
EOSINOPHIL NFR BLD: 3 % (ref 0.5–7.8)
ERYTHROCYTE [DISTWIDTH] IN BLOOD BY AUTOMATED COUNT: 14.6 % (ref 11.9–14.6)
GLOBULIN SER CALC-MCNC: 3.5 G/DL (ref 2.8–4.5)
GLUCOSE SERPL-MCNC: 136 MG/DL (ref 65–100)
HCT VFR BLD AUTO: 33.1 % (ref 35.8–46.3)
HGB BLD-MCNC: 10.1 G/DL (ref 11.7–15.4)
IMM GRANULOCYTES # BLD AUTO: 0 K/UL (ref 0–0.5)
IMM GRANULOCYTES NFR BLD AUTO: 0 % (ref 0–5)
LYMPHOCYTES # BLD: 2.3 K/UL (ref 0.5–4.6)
LYMPHOCYTES NFR BLD: 26 % (ref 13–44)
Lab: NORMAL
Lab: NORMAL
MCH RBC QN AUTO: 22.2 PG (ref 26.1–32.9)
MCHC RBC AUTO-ENTMCNC: 30.5 G/DL (ref 31.4–35)
MCV RBC AUTO: 72.9 FL (ref 82–102)
MONOCYTES # BLD: 0.7 K/UL (ref 0.1–1.3)
MONOCYTES NFR BLD: 8 % (ref 4–12)
NEUTS SEG # BLD: 5.4 K/UL (ref 1.7–8.2)
NEUTS SEG NFR BLD: 62 % (ref 43–78)
NRBC # BLD: 0 K/UL (ref 0–0.2)
PLATELET # BLD AUTO: 309 K/UL (ref 150–450)
PMV BLD AUTO: 10.9 FL (ref 9.4–12.3)
POTASSIUM SERPL-SCNC: 4.1 MMOL/L (ref 3.5–5.1)
PROT SERPL-MCNC: 7.3 G/DL (ref 6.3–8.2)
RBC # BLD AUTO: 4.54 M/UL (ref 4.05–5.2)
REFERENCE LAB: NORMAL
SODIUM SERPL-SCNC: 140 MMOL/L (ref 133–143)
WBC # BLD AUTO: 8.7 K/UL (ref 4.3–11.1)

## 2023-05-30 PROCEDURE — 85025 COMPLETE CBC W/AUTO DIFF WBC: CPT

## 2023-05-30 PROCEDURE — 36415 COLL VENOUS BLD VENIPUNCTURE: CPT

## 2023-05-30 PROCEDURE — 80053 COMPREHEN METABOLIC PANEL: CPT

## 2023-06-29 ENCOUNTER — TELEPHONE (OUTPATIENT)
Dept: ONCOLOGY | Age: 63
End: 2023-06-29

## 2023-07-19 ENCOUNTER — HOSPITAL ENCOUNTER (OUTPATIENT)
Dept: MAMMOGRAPHY | Age: 63
Discharge: HOME | End: 2023-07-22
Attending: INTERNAL MEDICINE
Payer: MEDICARE

## 2023-07-19 DIAGNOSIS — Z12.31 ENCOUNTER FOR SCREENING MAMMOGRAM FOR BREAST CANCER: ICD-10-CM

## 2023-07-19 PROCEDURE — 77063 BREAST TOMOSYNTHESIS BI: CPT

## 2023-08-24 ENCOUNTER — OFFICE VISIT (OUTPATIENT)
Dept: UROGYNECOLOGY | Age: 63
End: 2023-08-24

## 2023-08-24 VITALS — WEIGHT: 186.2 LBS | BODY MASS INDEX: 31.02 KG/M2 | HEIGHT: 65 IN

## 2023-08-24 DIAGNOSIS — R15.9 INCONTINENCE OF FECES WITH FECAL URGENCY: ICD-10-CM

## 2023-08-24 DIAGNOSIS — N81.6 POSTERIOR VAGINAL WALL PROLAPSE: Primary | ICD-10-CM

## 2023-08-24 DIAGNOSIS — N81.3 UTEROVAGINAL PROLAPSE, COMPLETE: ICD-10-CM

## 2023-08-24 DIAGNOSIS — N90.4 LICHEN SCLEROSUS OF FEMALE GENITALIA: ICD-10-CM

## 2023-08-24 DIAGNOSIS — N81.89 WEAKNESS OF PELVIC FLOOR: ICD-10-CM

## 2023-08-24 DIAGNOSIS — R15.2 INCONTINENCE OF FECES WITH FECAL URGENCY: ICD-10-CM

## 2023-08-24 LAB
BILIRUBIN, URINE, POC: NEGATIVE
BLOOD URINE, POC: NEGATIVE
GLUCOSE URINE, POC: NEGATIVE
KETONES, URINE, POC: NEGATIVE
LEUKOCYTE ESTERASE, URINE, POC: NEGATIVE
NITRITE, URINE, POC: NEGATIVE
PH, URINE, POC: 5.5 (ref 4.6–8)
PROTEIN,URINE, POC: NEGATIVE
SPECIFIC GRAVITY, URINE, POC: 1.01 (ref 1–1.03)
URINALYSIS CLARITY, POC: CLEAR
URINALYSIS COLOR, POC: YELLOW
UROBILINOGEN, POC: NORMAL

## 2023-08-24 RX ORDER — DIAZEPAM 2 MG/1
1 TABLET ORAL DAILY PRN
COMMUNITY

## 2023-08-24 NOTE — ASSESSMENT & PLAN NOTE
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 She also has FI. I suggest PT to prevent recurrence of her POP again and FI.

## 2023-08-24 NOTE — ASSESSMENT & PLAN NOTE
We discussed the epidemiology, pathogenesis and etiology of pelvic organ prolapse. This is a chronic condition that has progressed. We discussed the potential risk factors which include genetics and environmental factors, such as childbirth, aging, menopause, straining, and previous surgery. I offered her management options which included nothing, pessary, and surgery. She is interested in: surgery    Decisions regarding major surgery were discussed today. Info was given today on surgery.

## 2023-08-24 NOTE — ASSESSMENT & PLAN NOTE
We are going to fix the posterior wall POP. We discussed the differential diagnosis of fecal incontinence including stress, urge, and unconscious leakage. We discussed the implications of better coordination of the pelvic floor. We discussed the implications of better coordination of the pelvic floor. We discussed management of potential sphincter defects. We discussed the importance of stool consistency and the role of diet modification. The initial treatment of fecal incontinence is assuring that there is enough fiber in the diet. A high fiber diet with plenty of fluids (up to 8 glasses of water daily) is suggested to relieve these symptoms. Citrucel, Metamucil, Bene Fiber, Fibercon etc, are great supplements. The goal is to slowly work up to 25-30g of fiber daily.

## 2023-08-24 NOTE — PROGRESS NOTES
better coordination of the pelvic floor. We discussed management of potential sphincter defects. We discussed the importance of stool consistency and the role of diet modification. The initial treatment of fecal incontinence is assuring that there is enough fiber in the diet. A high fiber diet with plenty of fluids (up to 8 glasses of water daily) is suggested to relieve these symptoms. Citrucel, Metamucil, Bene Fiber, Fibercon etc, are great supplements. The goal is to slowly work up to 25-30g of fiber daily. 4. Lichen sclerosus of female genitalia  Assessment & Plan:  Consider biopsy of the posterior [place]cette. She is using a steroid cream daily right no from PCP. 5. Weakness of pelvic floor  Assessment & Plan:  She has recurrence of her post wall POP. She also has FI. I suggest PT to prevent recurrence of her POP again and FI. Return for Pre op. On this date 8/24/2023 I have spent 48 minutes reviewing previous notes, test results and face to face with the patient discussing the diagnosis and importance of compliance with the treatment plan as well as documenting on the day of the visit.     Kareem Gomez DO

## 2023-09-05 ENCOUNTER — HOSPITAL ENCOUNTER (OUTPATIENT)
Dept: LAB | Age: 63
Discharge: HOME | End: 2023-09-08
Payer: MEDICARE

## 2023-09-05 DIAGNOSIS — Z79.899 HIGH RISK MEDICATION USE: ICD-10-CM

## 2023-09-05 DIAGNOSIS — C92.10 CML (CHRONIC MYELOID LEUKEMIA) (HCC): ICD-10-CM

## 2023-09-05 LAB
ALBUMIN SERPL-MCNC: 4.1 G/DL (ref 3.2–4.6)
ALBUMIN/GLOB SERPL: 1.2 (ref 0.4–1.6)
ALP SERPL-CCNC: 76 U/L (ref 50–136)
ALT SERPL-CCNC: 28 U/L (ref 12–65)
ANION GAP SERPL CALC-SCNC: 6 MMOL/L (ref 2–11)
AST SERPL-CCNC: 25 U/L (ref 15–37)
BASOPHILS # BLD: 0.1 K/UL (ref 0–0.2)
BASOPHILS NFR BLD: 1 % (ref 0–2)
BILIRUB SERPL-MCNC: 0.5 MG/DL (ref 0.2–1.1)
BUN SERPL-MCNC: 11 MG/DL (ref 8–23)
CALCIUM SERPL-MCNC: 8.7 MG/DL (ref 8.3–10.4)
CHLORIDE SERPL-SCNC: 106 MMOL/L (ref 101–110)
CO2 SERPL-SCNC: 28 MMOL/L (ref 21–32)
CREAT SERPL-MCNC: 0.9 MG/DL (ref 0.6–1)
DIFFERENTIAL METHOD BLD: ABNORMAL
EOSINOPHIL # BLD: 0.2 K/UL (ref 0–0.8)
EOSINOPHIL NFR BLD: 3 % (ref 0.5–7.8)
ERYTHROCYTE [DISTWIDTH] IN BLOOD BY AUTOMATED COUNT: 15.4 % (ref 11.9–14.6)
GLOBULIN SER CALC-MCNC: 3.3 G/DL (ref 2.8–4.5)
GLUCOSE SERPL-MCNC: 140 MG/DL (ref 65–100)
HCT VFR BLD AUTO: 33.2 % (ref 35.8–46.3)
HGB BLD-MCNC: 9.9 G/DL (ref 11.7–15.4)
IMM GRANULOCYTES # BLD AUTO: 0 K/UL (ref 0–0.5)
IMM GRANULOCYTES NFR BLD AUTO: 0 % (ref 0–5)
LYMPHOCYTES # BLD: 1.8 K/UL (ref 0.5–4.6)
LYMPHOCYTES NFR BLD: 24 % (ref 13–44)
Lab: NORMAL
Lab: NORMAL
MAGNESIUM SERPL-MCNC: 2.1 MG/DL (ref 1.8–2.4)
MCH RBC QN AUTO: 22 PG (ref 26.1–32.9)
MCHC RBC AUTO-ENTMCNC: 29.8 G/DL (ref 31.4–35)
MCV RBC AUTO: 73.6 FL (ref 82–102)
MONOCYTES # BLD: 0.6 K/UL (ref 0.1–1.3)
MONOCYTES NFR BLD: 8 % (ref 4–12)
NEUTS SEG # BLD: 4.9 K/UL (ref 1.7–8.2)
NEUTS SEG NFR BLD: 64 % (ref 43–78)
NRBC # BLD: 0 K/UL (ref 0–0.2)
PLATELET # BLD AUTO: 293 K/UL (ref 150–450)
PMV BLD AUTO: 10.2 FL (ref 9.4–12.3)
POTASSIUM SERPL-SCNC: 4 MMOL/L (ref 3.5–5.1)
PROT SERPL-MCNC: 7.4 G/DL (ref 6.3–8.2)
RBC # BLD AUTO: 4.51 M/UL (ref 4.05–5.2)
REFERENCE LAB: NORMAL
SODIUM SERPL-SCNC: 140 MMOL/L (ref 133–143)
WBC # BLD AUTO: 7.6 K/UL (ref 4.3–11.1)

## 2023-09-05 PROCEDURE — 85025 COMPLETE CBC W/AUTO DIFF WBC: CPT

## 2023-09-05 PROCEDURE — 80053 COMPREHEN METABOLIC PANEL: CPT

## 2023-09-05 PROCEDURE — 83735 ASSAY OF MAGNESIUM: CPT

## 2023-09-05 PROCEDURE — 36415 COLL VENOUS BLD VENIPUNCTURE: CPT

## 2023-09-11 DIAGNOSIS — C92.10 CML (CHRONIC MYELOID LEUKEMIA) (HCC): Primary | ICD-10-CM

## 2023-09-11 NOTE — PROGRESS NOTES
[facility] Hematology and Oncology: Office Visit Established Patient    Reason for follow up:    Jairo Herrera  is seen in follow-up for CML. Overview: (copied from prior)  Ms. Macdonald Rubinstein was seen for the first time in February 2016. She was referred for evaluation of an elevated white count. Ms. Kel Gaston is a woman of  Generally good health. She has a variety well compensated medical problems including beta+ thalassemia, degenerative joint disease, hypothyroidism, diverticulitis and chronic pain syndrome. She has been seen by pain management and has undergone a variety of interventions for varied discomforts. She stated that she had undergone a routine CBC some time in June 2015. Her understanding is that this CBC was normal.  On February 2, 2016, she was evaluated for generalized achiness principally in her back, this prompted a CBC and sedimentation rate. The CBC was notable for a white count of 22,200 with a hematocrit of 34.2 and an MCV of 68. Her platelet count was 765,192. The automated differential revealed 78% granulocytes 20% lymphocytes 2% monocytes. Her sedimentation rate was 28. Other than for her discomfort, the patient indicated that she felt well. She denied any poppy swelling. The peripheral flow cytometry was normal as was the LIBBY-2 kinase assay. Her BCR-ABL analysis however was notable for mutated transcripts in 81% of circulating cells. She began treatment with imatinib in April 2016. Her first BCR-ABL quantitation following the initiation of imatinib showed a greater than 3 log reduction in her CML burden. She shortly thereafter went into a complete molecular remission. She undergoes routine assessments of BCR-ABL. In February 2020, after a prolonged period of a complete molecular remission she was found to have BCR-ABL transcripts at  0.0469%. She had been taking imatinib 5-6 times per week due to diarrhea at that time.  She was told to take the medication daily again and to

## 2023-09-12 ENCOUNTER — TELEPHONE (OUTPATIENT)
Dept: UROGYNECOLOGY | Age: 63
End: 2023-09-12

## 2023-09-12 ENCOUNTER — OFFICE VISIT (OUTPATIENT)
Dept: ONCOLOGY | Age: 63
End: 2023-09-12
Payer: MEDICARE

## 2023-09-12 VITALS
BODY MASS INDEX: 31.49 KG/M2 | SYSTOLIC BLOOD PRESSURE: 148 MMHG | RESPIRATION RATE: 18 BRPM | OXYGEN SATURATION: 98 % | WEIGHT: 189 LBS | HEIGHT: 65 IN | HEART RATE: 83 BPM | DIASTOLIC BLOOD PRESSURE: 83 MMHG | TEMPERATURE: 98.3 F

## 2023-09-12 DIAGNOSIS — C92.10 CML (CHRONIC MYELOID LEUKEMIA) (HCC): Primary | ICD-10-CM

## 2023-09-12 PROCEDURE — 99214 OFFICE O/P EST MOD 30 MIN: CPT | Performed by: INTERNAL MEDICINE

## 2023-09-12 ASSESSMENT — PATIENT HEALTH QUESTIONNAIRE - PHQ9: DEPRESSION UNABLE TO ASSESS: PT REFUSES

## 2023-09-12 NOTE — PATIENT INSTRUCTIONS
Patient Instructions from Today's Visit    Reason for Visit:  Follow up - CML    Diagnosis Information:  [email or web address] [email or web address]-[email or web address]-answers-patient-education-materials/oqbn-rgjgxeb-[email or web address]-sheets    Plan:  Continue Imatinib 400mg daily. OK to stop Imatinib 1 week prior to planned surgery. Please call us if you have any questions or concerns before your next visit. Follow Up: Follow up in 3 months with labs 1 week prior. Recent Lab Results:  No visits with results within 3 Day(s) from this visit.    Latest known visit with results is:   Hospital Outpatient Visit on 09/05/2023   Component Date Value Ref Range Status    WBC 09/05/2023 7.6  4.3 - 11.1 K/uL Final    RBC 09/05/2023 4.51  4.05 - 5.2 M/uL Final    Hemoglobin 09/05/2023 9.9 (L)  11.7 - 15.4 g/dL Final    Hematocrit 09/05/2023 33.2 (L)  35.8 - 46.3 % Final    MCV 09/05/2023 73.6 (L)  82.0 - 102.0 FL Final    MCH 09/05/2023 22.0 (L)  26.1 - 32.9 PG Final    MCHC 09/05/2023 29.8 (L)  31.4 - 35.0 g/dL Final    RDW 09/05/2023 15.4 (H)  11.9 - 14.6 % Final    Platelets 81/96/5286 293  150 - 450 K/uL Final    MPV 09/05/2023 10.2  9.4 - 12.3 FL Final    nRBC 09/05/2023 0.00  0.0 - 0.2 K/uL Final    **Note: Absolute NRBC parameter is now reported with Hemogram**    Differential Type 09/05/2023 AUTOMATED    Final    Neutrophils % 09/05/2023 64  43 - 78 % Final    Lymphocytes % 09/05/2023 24  13 - 44 % Final    Monocytes % 09/05/2023 8  4.0 - 12.0 % Final    Eosinophils % 09/05/2023 3  0.5 - 7.8 % Final    Basophils % 09/05/2023 1  0.0 - 2.0 % Final    Immature Granulocytes 09/05/2023 0  0.0 - 5.0 % Final    Neutrophils Absolute 09/05/2023 4.9  1.7 - 8.2 K/UL Final    Lymphocytes Absolute 09/05/2023 1.8  0.5 - 4.6 K/UL Final    Monocytes Absolute 09/05/2023 0.6  0.1 - 1.3 K/UL Final    Eosinophils Absolute 09/05/2023 0.2  0.0 - 0.8 K/UL Final    Basophils Absolute 09/05/2023 0.1  0.0 - 0.2 K/UL Final    Absolute Immature Granulocyte 09/05/2023 0.0

## 2023-09-12 NOTE — TELEPHONE ENCOUNTER
Patient called with insurance questions - those were answered. Also wanted list of PT locations mailed to her. This will be done and she can call the office back when she decides on a location and we will place the referral at that point.

## 2023-10-03 ENCOUNTER — HOSPITAL ENCOUNTER (OUTPATIENT)
Dept: PHYSICAL THERAPY | Age: 63
Setting detail: RECURRING SERIES
Discharge: HOME | End: 2023-10-06
Payer: MEDICARE

## 2023-10-03 DIAGNOSIS — M62.89 PELVIC FLOOR DYSFUNCTION IN FEMALE: ICD-10-CM

## 2023-10-03 DIAGNOSIS — M62.81 MUSCLE WEAKNESS (GENERALIZED): Primary | ICD-10-CM

## 2023-10-03 DIAGNOSIS — R27.8 OTHER LACK OF COORDINATION: ICD-10-CM

## 2023-10-03 DIAGNOSIS — N81.6 RECTOCELE: ICD-10-CM

## 2023-10-03 PROCEDURE — 97530 THERAPEUTIC ACTIVITIES: CPT

## 2023-10-03 PROCEDURE — 97161 PT EVAL LOW COMPLEX 20 MIN: CPT

## 2023-10-03 ASSESSMENT — PAIN SCALES - GENERAL: PAINLEVEL_OUTOF10: 0

## 2023-10-03 NOTE — PROGRESS NOTES
Jessica Cohn  : 1960  Primary: [address] (Medicare Managed)  Secondary:  [facility]  [address] [place] [address]  Phone: [phone]  Fax: [phone] Plan Frequency: one time a week for 90 days  Plan of Care/Certification Expiration Date: 24      >PT Visit Info:  Plan Frequency: one time a week for 90 days  Plan of Care/Certification Expiration Date: 24      Visit Count:  1    OUTPATIENT PHYSICAL THERAPY:OP NOTE TYPE: OP Note Type: Treatment Note 10/3/2023       Episode  }Appt Desk             Treatment Diagnosis:    Muscle weakness (generalized)  Pelvic floor dysfunction in female  Other lack of coordination  Rectocele  Visit Diagnoses         Codes    Muscle weakness (generalized)    -  Primary M62.81    Pelvic floor dysfunction in female     M62.89    Other lack of coordination     R27.8    Rectocele     N81.6          Medical/Referring Diagnosis:  No admission diagnoses are documented for this encounter. Referring Physician:  Patti Carrizales DO MD Orders:  PT Eval and Treat   Date of Onset:  No data recorded   Allergies:   Latex, Hydrocodone-acetaminophen, Iodine, and Meperidine  Restrictions/Precautions:  No data recordedNo data recorded     Interventions Planned (Treatment may consist of any combination of the following):    Current Treatment Recommendations: Strengthening; ROM; ADL/Self-care training; Neuromuscular re-education; Manual; Home exercise program; Patient/Caregiver education & training; Equipment evaluation, education, & procurement; Safety education & training; Positioning; Therapeutic activities     >Subjective Comments:     >Initial:     010>Post Session:        /10  Medications Last Reviewed:  10/3/2023  Updated Objective Findings:  See Evaluation Note from today  Treatment   THERAPEUTIC EXERCISE: ( minutes):    Exercises per grid below to improve mobility, strength, and coordination.   Required minimal visual,

## 2023-10-03 NOTE — THERAPY EVALUATION
severe      [] WNL    Palpation: via vaginal canal  Superficial Pelvic Floor Musculature (PFM): Tender? Intermediate PFM Tender? Deep PFM Tender? Superficial Transverse Perineal [] Right  [] Left  []DNT Deep Transverse Perineal [] Right  [] Left  []DNT Puborectalis [] Right  [] Left  []DNT   Ischiocavernosus [] Right  [] Left  []DNT Compressor Urethra [] Right  [] Left  []DNT Pubococcygeus [] Right  [] Left  []DNT   Bulbocavernosus [] Right  [] Left  []DNT   Iliococcygeus [] Right  [] Left  []DNT       Obturator Internus [] Right  [] Left  []DNT       Coccygeus [] Right  [] Left  []DNT            Breath assessment:  Observation: improved with verbal cuing  Coordination of pelvic floor muscles with breath: minimal pelvic floor muscle excursion         ASSESSMENT   Initial Assessment:  Rito Andrew presents with musculoskeletal limitations including pelvic floor muscle (PFM) tension, reduced PFM Range of Motion (ROM), increased tenderness to palpation of the PFM, reduced coordination and awareness of PFM, poor abdominal strength, and decreased pelvic floor muscle (PFM) strength. These limitations are impairing the patient's ability to properly coordinate perineal elevation and descent for normalized PFM function, contributing to voiding dysfunction. As a result, she is limited in her/his ability to participate in household chores, physical activities such as walking, swimming, or other exercise, and social activities outside of the home. Problem List: (Impacting functional limitations): Body Structures, Functions, Activity Limitations Requiring Skilled Therapeutic Intervention: Decreased ROM;  Decreased strength; Decreased endurance; Decreased coordination       Therapy Prognosis:   Therapy Prognosis: Good       Initial Assessment Complexity:   Decision Making: Low Complexity      PLAN   Effective Dates: 10-3-23 TO Plan of Care/Certification Expiration Date: 01/01/24     Frequency/Duration: Plan

## 2023-10-12 ENCOUNTER — HOSPITAL ENCOUNTER (OUTPATIENT)
Dept: PHYSICAL THERAPY | Age: 63
Setting detail: RECURRING SERIES
Discharge: HOME | End: 2023-10-15
Payer: MEDICARE

## 2023-10-12 PROCEDURE — 97112 NEUROMUSCULAR REEDUCATION: CPT

## 2023-10-12 PROCEDURE — 97530 THERAPEUTIC ACTIVITIES: CPT

## 2023-10-12 NOTE — PROGRESS NOTES
per day (PPD). Use of stool softeners or laxatives? No     Sexual: Pt is not sexually active      Pelvic Organ Prolapse/Pelvic Pain: Location: rectocele     OB History: Number of pregnancies: 2 Number of vaginal births: 2 Number of caesarean births : 0.     Treatment   THERAPEUTIC EXERCISE: ( minutes):    Exercises per grid below to improve mobility, strength, and coordination. Required minimal visual, verbal, manual, and tactile cues to promote proper body alignment, promote proper body posture, promote proper body mechanics, and promote proper body breathing techniques. Progressed resistance, range, repetitions, and complexity of movement as indicated. Date:   Date:   Date:     Activity/Exercise Parameters Parameters Parameters   Patient Education Discussed HEP and POC                    All exercises performed with emphasis on kegel and breathing in order improve coordination, awareness and to minimize symptoms. MANUAL THERAPY: ( minutes): to improve soft tissue tone    Date Type Location Comments   10/12/2023 Internal assessment/treatment                                         (Used abbreviations: MET - muscle energy technique; SCS- Strain counter strain; CTM-Connective tissue mobilizations; CR- Contract/Relax; SP- Sustained pressure; PIT- Positional inhibition techniques; STM Soft -tissue mobilization; MM- Myofascial mobilization; TrP-Trigger point release; IASTM- Instrument assisted soft tissue mobilizations, TDN-Trigger point dry needling)    Pt gives verbal consent to internal vaginal assessment/treatment without chaperon present. NEURO REEDUCATION: (30 minutes) to improve control and coordination of pelvic floor     Date:  10-12-23 Date:   Date:     Activity/Exercise Parameters Parameters Parameters   Biofeedback With sEMG was utilized for coordination of PFM.  Supine, sitting, standing                                               THERAPEUTIC ACTIVITY: (10 minutes):     TA Educational Topic

## 2023-10-16 ENCOUNTER — HOSPITAL ENCOUNTER (OUTPATIENT)
Dept: PHYSICAL THERAPY | Age: 63
Setting detail: RECURRING SERIES
Discharge: HOME | End: 2023-10-19
Payer: MEDICARE

## 2023-10-16 PROCEDURE — 97140 MANUAL THERAPY 1/> REGIONS: CPT

## 2023-10-16 PROCEDURE — 97110 THERAPEUTIC EXERCISES: CPT

## 2023-10-16 NOTE — PROGRESS NOTES
fecal incontinence, constipation, and incomplete emptying. Pt does use pads for bowel protection; with loose bowels pads per day (PPD). Use of stool softeners or laxatives? No     Sexual: Pt is not sexually active      Pelvic Organ Prolapse/Pelvic Pain: Location: rectocele     OB History: Number of pregnancies: 2 Number of vaginal births: 2 Number of caesarean births : 0.     Treatment   THERAPEUTIC EXERCISE: ( 25 minutes):    Exercises per grid below to improve mobility, strength, and coordination. Required minimal visual, verbal, manual, and tactile cues to promote proper body alignment, promote proper body posture, promote proper body mechanics, and promote proper body breathing techniques. Progressed resistance, range, repetitions, and complexity of movement as indicated. Date:  10-16-23 Date:   Date:     Activity/Exercise Parameters Parameters Parameters   Patient Education Discussed HEP and POC     Diaphragmatic breathing 4 min breath     Butterfly stretch 3 min     Supine trunk rotations 3 min     Bent knee fallouts 2 min     DKTC stretch 2 min     Seated lumbar SB flexion               All exercises performed with emphasis on kegel and breathing in order improve coordination, awareness and to minimize symptoms. MANUAL THERAPY: ( 15 minutes): to improve soft tissue tone    Date Type Location Comments   10/16/2023 Abdominal STM, diaphragmatic release, QL STM Abdomin, L side diaphragm,  B QL. Performed soft tissue mobilization in clockwise on abdomin, sustained pressure release on B QL's. Unable to tolerate diaphragmatic release on R side due to chronic rib pain.                                        (Used abbreviations: MET - muscle energy technique; SCS- Strain counter strain; CTM-Connective tissue mobilizations; CR- Contract/Relax; SP- Sustained pressure; PIT- Positional inhibition techniques; STM Soft -tissue mobilization; MM- Myofascial mobilization; TrP-Trigger point release; IASTM- Instrument

## 2023-10-18 ENCOUNTER — OFFICE VISIT (OUTPATIENT)
Dept: UROGYNECOLOGY | Age: 63
End: 2023-10-18
Payer: MEDICARE

## 2023-10-18 DIAGNOSIS — N81.6 POSTERIOR VAGINAL WALL PROLAPSE: ICD-10-CM

## 2023-10-18 DIAGNOSIS — N90.4 LICHEN SCLEROSUS OF FEMALE GENITALIA: Primary | ICD-10-CM

## 2023-10-18 PROCEDURE — 99215 OFFICE O/P EST HI 40 MIN: CPT | Performed by: OBSTETRICS & GYNECOLOGY

## 2023-10-18 NOTE — PROGRESS NOTES
[facility] UROGYNECOLOGY  [address]  Dept: [phone]        PCP:  ZAIDA Garza NP    10/18/2023        HPI:  Nicolle Zepeda is here to discuss testing results and surgical options. She has read through the material previously given to her explaining her surgical options for her chronic condition that has shown continued progression over time. I have reviewed her initial examination and POP-Q. No results found for this visit on 10/18/23. There were no vitals taken for this visit. We have reviewed her POP-q exam together. We have reviewed her urodynamic study results together. 8/24/2023     1:00 PM   Pelvic Organ Prolapse Quantification   Anterior Wall (Aa) -3   Anterior Wall (Ba) -3   Cervix or Cuff (C) -4   Genital Haitus (gh) 4   Perineal Body (pb) 4   Total Vaginal Length (tvl) 8   Posterior Wall (Ap) 2   Posterior Wall (Bp) 2   Posterior Fornix (D) x          1. Lichen sclerosus of female genitalia  Assessment & Plan:  Going to have a biopsy at the time of surgery. 2. Posterior vaginal wall prolapse  Assessment & Plan:  We discussed the epidemiology, pathogenesis and etiology of pelvic organ prolapse. We discussed the potential risk factors which include genetics and environmental factors, such as childbirth, aging, menopause, straining, and previous surgery. I offered her management options which included nothing, pessary, and surgery. We discussed her options of correcting the prolapse through a vaginal approach and laparoscopic approach. We also discussed repairing the vaginal prolapse with mesh and the option to do this without mesh. She has decided she would like to have a native tissue posterior wall and apical prolapse repair, possible graft. Skin biopsy for Lichen Sclerosis    We discussed the surgical technique involved in the prolapse repair.  I described the anatomic principles to the surgery as well as

## 2023-10-18 NOTE — ASSESSMENT & PLAN NOTE
We discussed the epidemiology, pathogenesis and etiology of pelvic organ prolapse. We discussed the potential risk factors which include genetics and environmental factors, such as childbirth, aging, menopause, straining, and previous surgery. I offered her management options which included nothing, pessary, and surgery. We discussed her options of correcting the prolapse through a vaginal approach and laparoscopic approach. We also discussed repairing the vaginal prolapse with mesh and the option to do this without mesh. She has decided she would like to have a native tissue posterior wall and apical prolapse repair, possible graft. Skin biopsy for Lichen Sclerosis    We discussed the surgical technique involved in the prolapse repair. I described the anatomic principles to the surgery as well as the success rates of conventional and compensatory repairs. There is a 20% chance of recurrence and 15% reoperation rate. I described the surgical technique to be employed for her upcoming surgery. We discussed the anticipated postoperative course. Risks, benefits, indications and alternatives of the surgery were discussed. Risks reviewed with the patient include bleeding, infections, injury to pelvic organs (bowel, bladder, urethra, ureters, blood vessels, and nerves), recurrence, dyspareunia, anesthetic complications, and death. We discussed that other complications could arise and that the list is too long to discuss comprehensively. We discussed that some patients do fail, although not all require another surgery. We discussed the risks of repair which include pain, dysparunia, bladder and/or bowel dysfunction and sexual dysfunction. We discussed that the success of a native tissue repair is based on proper healing and scar formation. We discussed that some patients do fail, although not all require another surgery.  We discussed the risks of repair which include pain, dysparunia, bladder and/or

## 2023-10-25 NOTE — PROGRESS NOTES
1:00 PM Mabel Lo, PT [facility]   12/6/2023  1:20 PM PERIPHERAL [facility]   12/13/2023  1:30 PM ZAIDA Morales - CNP [facility]   3/6/2024  2:00 PM PERIPHERAL [facility] [address]   3/13/2024  3:00 PM Jessica Jones MD [facility]

## 2023-10-26 ENCOUNTER — HOSPITAL ENCOUNTER (OUTPATIENT)
Dept: PHYSICAL THERAPY | Age: 63
Setting detail: RECURRING SERIES
Discharge: HOME | End: 2023-10-29
Payer: MEDICARE

## 2023-10-26 PROCEDURE — 97110 THERAPEUTIC EXERCISES: CPT

## 2023-10-26 RX ORDER — IMATINIB MESYLATE 400 MG/1
400 TABLET, FILM COATED ORAL DAILY
Qty: 30 TABLET | Refills: 5 | OUTPATIENT
Start: 2023-10-26

## 2023-10-31 NOTE — PROGRESS NOTES
Дмитрий Reynolds  : 1960  Primary: [address] (Medicare Managed)  Secondary:  [facility]  [address] [place] [address] [place] [id number]  Phone: [phone]  Fax: [phone] Plan Frequency: one time a week for 90 days  Plan of Care/Certification Expiration Date: 24      >PT Visit Info:  Plan Frequency: one time a week for 90 days  Plan of Care/Certification Expiration Date: 24      Visit Count:  5    OUTPATIENT PHYSICAL THERAPY:OP NOTE TYPE: OP Note Type: Treatment Note 2023       Episode  }Appt Desk             Treatment Diagnosis:    Muscle weakness (generalized)  Pelvic floor dysfunction in female  Other lack of coordination  Rectocele      Medical/Referring Diagnosis:  No admission diagnoses are documented for this encounter. Referring Physician:  Yris Escoto DO MD Orders:  PT Eval and Treat   Date of Onset:  No data recorded   Allergies:   Latex, Hydrocodone-acetaminophen, Iodine, and Meperidine  Restrictions/Precautions:  No data recordedNo data recorded     Interventions Planned (Treatment may consist of any combination of the following):    Current Treatment Recommendations: Strengthening; ROM; ADL/Self-care training; Neuromuscular re-education; Manual; Home exercise program; Patient/Caregiver education & training; Equipment evaluation, education, & procurement; Safety education & training; Positioning; Therapeutic activities     >Subjective Comments: Patient reports her blood pressure is up. The sciatic part is better but the back is tender today. Pelvic floor is still the same. She is doing the exercises. >Initial:      /10>Post Session:        /10  Medications Last Reviewed:  2023  Updated Objective Findings:    Ms. Angel Castaneda is a 58 yo female referred to pelvic floor physical therapy (PFPT) by Yris Escoto DO  No admission diagnoses are documented for this encounter. .  Patient reports she is having a surgery in October for

## 2023-11-01 ENCOUNTER — PREP FOR PROCEDURE (OUTPATIENT)
Dept: UROGYNECOLOGY | Age: 63
End: 2023-11-01

## 2023-11-02 ENCOUNTER — HOSPITAL ENCOUNTER (OUTPATIENT)
Dept: PHYSICAL THERAPY | Age: 63
Setting detail: RECURRING SERIES
Discharge: HOME | End: 2023-11-05
Payer: MEDICARE

## 2023-11-02 PROCEDURE — 97110 THERAPEUTIC EXERCISES: CPT

## 2023-11-03 RX ORDER — SODIUM CHLORIDE 9 MG/ML
INJECTION, SOLUTION INTRAVENOUS PRN
Status: CANCELLED | OUTPATIENT
Start: 2023-11-03

## 2023-11-03 RX ORDER — SODIUM CHLORIDE 0.9 % (FLUSH) 0.9 %
5-40 SYRINGE (ML) INJECTION PRN
Status: CANCELLED | OUTPATIENT
Start: 2023-11-03

## 2023-11-03 RX ORDER — SODIUM CHLORIDE 0.9 % (FLUSH) 0.9 %
5-40 SYRINGE (ML) INJECTION EVERY 12 HOURS SCHEDULED
Status: CANCELLED | OUTPATIENT
Start: 2023-11-03

## 2023-11-03 NOTE — H&P
History and Physical    Patient: Hung Oconnor MRN: [id number]  SSN: [id number]   YOB: 1960  Age: 59 y.o. Sex: female       Chief Complaint:  Pelvic Organ Prolapse    History of Present Illness:  Hung Oconnor is a 59 y.o. female with recurrent POP. Ms. Hung Oconnor has been experiencing prolapse for 1 year. The prolapse does cause her pressure. She does not have to splint to complete urination, a BM, or for comfort. Nothing makes her prolapse symptoms worse. nothing makes her prolapse symptoms better. She has not tried a pessary, she has not tried physical therapy, she has  had surgery for her POP. She does not have incontinence but struggles with urgency. She voids 4-5 times during the day. She voids 3-5 times over night. She has 7 BM per week, and does not strain. She drinks 0 caffeine drinks beverages per day. She uses 0 artificial sweeteners per day. She drinks 0 alcoholic beverages per week. She has pelvic surgery in the past. 1990 hysterectomy cystocele and rectocele repair at [facility] area for lichen sclerosis diagnosis  Her last PAP: 2019  No components found for: [id number]  Her last Colonoscopy: 2015  Her last Mammogram: 8/2023    She does not have a history of DM. Lab Results   Component Value Date    LABA1C 6.2 (H) 06/23/2020     Lab Results   Component Value Date     06/23/2020       She does not have a history of sleep apnea. Tobacco: No    Sexual History: not sexually active. Allergies: Allergies   Allergen Reactions    Latex Other (See Comments)     Respiratory issues    Hydrocodone-Acetaminophen Nausea And Vomiting     Name brand lortab ok; allergic to generic only    Iodine Rash    Meperidine Nausea And Vomiting         Medications:  No current facility-administered medications for this encounter.     Current Outpatient Medications:     Aspirin-Acetaminophen-Caffeine (EXCEDRIN EXTRA STRENGTH PO), Take 1 tablet by

## 2023-11-07 RX ORDER — IMATINIB MESYLATE 400 MG/1
TABLET, FILM COATED ORAL
Qty: 30 TABLET | Refills: 5 | OUTPATIENT
Start: 2023-11-07

## 2023-11-07 RX ORDER — HYDROCHLOROTHIAZIDE 12.5 MG/1
12.5 CAPSULE, GELATIN COATED ORAL DAILY
COMMUNITY
Start: 2023-11-02

## 2023-11-07 NOTE — PERIOP NOTE
Patient verified name and . Order for consent found in EHR and does not match case posting; patient verifies procedure. Case message sent to Dorita Kolb  and Kae Garza. Type 1B surgery, phone assessment complete. Orders received. Labs per surgeon: None. Labs per anesthesia protocol: Hgb 9.4 and K+ 4.3 on 10/30/23; results within anesthesia guidelines. Patient answered medical/surgical history questions at their best of ability. All prior to admission medications documented in EPIC. Patient instructed to take the following medications the day of surgery according to anesthesia guidelines with a small sip of water: Tramadol PRN, Valium PRN, Valtrex PRN, morning eye gtts, Synthroid. Hold all vitamins, supplements, herbals 7 days prior to surgery and NSAIDS/Toradol 5 days prior to surgery. Patient instructed on the following:    > Arrive at [address] [place] [address], time of arrival to be called the day before by 1700  > NPO after midnight, unless otherwise indicated, including gum, mints, and ice chips  > Responsible adult must drive patient to the hospital, stay during surgery, and patient will need supervision 24 hours after anesthesia  > Use anti-bacterial soap in shower the night before surgery and on the morning of surgery  > All piercings must be removed prior to arrival.    > Leave all valuables (money and jewelry) at home but bring insurance card and ID on DOS.   > Do not wear make-up, nail polish, lotions, cologne, perfumes, powders, or oil on skin. Artificial nails are not permitted.

## 2023-11-07 NOTE — PROGRESS NOTES
Session Pain  PT Visit Info  [address] [employer] [facility]  MD Guidelines  Scanned Media  Benefits  MyChart    Future Appointments   Date Time Provider [address]   11/30/2023  1:00 PM [facility]   12/6/2023  1:20 PM PERIPHERAL [facility]   12/13/2023  1:30 PM ZAIDA Cline - CNP [facility]   12/14/2023  2:45 PM Andreina Diggs DO [facility]   3/6/2024  2:00 PM PERIPHERAL [facility] [address]   3/13/2024  3:00 PM Christian Marie MD [facility]

## 2023-11-09 ENCOUNTER — HOSPITAL ENCOUNTER (OUTPATIENT)
Dept: PHYSICAL THERAPY | Age: 63
Setting detail: RECURRING SERIES
Discharge: HOME | End: 2023-11-12
Payer: MEDICARE

## 2023-11-09 PROCEDURE — 97110 THERAPEUTIC EXERCISES: CPT

## 2023-11-09 ASSESSMENT — PAIN SCALES - GENERAL: PAINLEVEL_OUTOF10: 2

## 2023-11-12 RX ORDER — SODIUM CHLORIDE 9 MG/ML
INJECTION, SOLUTION INTRAVENOUS PRN
Status: CANCELLED | OUTPATIENT
Start: 2023-11-12

## 2023-11-12 RX ORDER — SODIUM CHLORIDE 0.9 % (FLUSH) 0.9 %
5-40 SYRINGE (ML) INJECTION PRN
Status: CANCELLED | OUTPATIENT
Start: 2023-11-12

## 2023-11-12 RX ORDER — FENTANYL CITRATE 50 UG/ML
100 INJECTION, SOLUTION INTRAMUSCULAR; INTRAVENOUS
Status: CANCELLED | OUTPATIENT
Start: 2023-11-12 | End: 2023-11-13

## 2023-11-12 RX ORDER — MIDAZOLAM HYDROCHLORIDE 2 MG/2ML
2 INJECTION, SOLUTION INTRAMUSCULAR; INTRAVENOUS
Status: CANCELLED | OUTPATIENT
Start: 2023-11-12 | End: 2023-11-13

## 2023-11-12 RX ORDER — SODIUM CHLORIDE 0.9 % (FLUSH) 0.9 %
5-40 SYRINGE (ML) INJECTION EVERY 12 HOURS SCHEDULED
Status: CANCELLED | OUTPATIENT
Start: 2023-11-12

## 2023-11-13 ENCOUNTER — HOSPITAL ENCOUNTER (OUTPATIENT)
Age: 63
Setting detail: OUTPATIENT SURGERY
Discharge: HOME | End: 2023-11-13
Attending: OBSTETRICS & GYNECOLOGY | Admitting: OBSTETRICS & GYNECOLOGY
Payer: MEDICARE

## 2023-11-13 ENCOUNTER — ANESTHESIA EVENT (OUTPATIENT)
Dept: SURGERY | Age: 63
End: 2023-11-13
Payer: MEDICARE

## 2023-11-13 ENCOUNTER — ANESTHESIA (OUTPATIENT)
Dept: SURGERY | Age: 63
End: 2023-11-13
Payer: MEDICARE

## 2023-11-13 VITALS
HEIGHT: 64 IN | SYSTOLIC BLOOD PRESSURE: 132 MMHG | HEART RATE: 67 BPM | DIASTOLIC BLOOD PRESSURE: 68 MMHG | OXYGEN SATURATION: 98 % | TEMPERATURE: 98.2 F | RESPIRATION RATE: 15 BRPM | BODY MASS INDEX: 32.1 KG/M2 | WEIGHT: 188 LBS

## 2023-11-13 DIAGNOSIS — C92.10 CML (CHRONIC MYELOID LEUKEMIA) (HCC): ICD-10-CM

## 2023-11-13 PROBLEM — N81.3 UTEROVAGINAL PROLAPSE, COMPLETE: Status: ACTIVE | Noted: 2023-11-13

## 2023-11-13 PROCEDURE — C1762 CONN TISS, HUMAN(INC FASCIA): HCPCS | Performed by: OBSTETRICS & GYNECOLOGY

## 2023-11-13 PROCEDURE — 57285 REPAIR PARAVAG DEFECT VAG: CPT | Performed by: OBSTETRICS & GYNECOLOGY

## 2023-11-13 PROCEDURE — 88305 TISSUE EXAM BY PATHOLOGIST: CPT

## 2023-11-13 PROCEDURE — 56605 BIOPSY OF VULVA/PERINEUM: CPT | Performed by: OBSTETRICS & GYNECOLOGY

## 2023-11-13 PROCEDURE — 57282 COLPOPEXY EXTRAPERITONEAL: CPT | Performed by: OBSTETRICS & GYNECOLOGY

## 2023-11-13 PROCEDURE — 3700000001 HC ADD 15 MINUTES (ANESTHESIA): Performed by: OBSTETRICS & GYNECOLOGY

## 2023-11-13 PROCEDURE — 7100000011 HC PHASE II RECOVERY - ADDTL 15 MIN: Performed by: OBSTETRICS & GYNECOLOGY

## 2023-11-13 PROCEDURE — 6370000000 HC RX 637 (ALT 250 FOR IP): Performed by: OBSTETRICS & GYNECOLOGY

## 2023-11-13 PROCEDURE — 2580000003 HC RX 258: Performed by: ANESTHESIOLOGY

## 2023-11-13 PROCEDURE — 2709999900 HC NON-CHARGEABLE SUPPLY: Performed by: OBSTETRICS & GYNECOLOGY

## 2023-11-13 PROCEDURE — 57267 INSERT MESH/PELVIC FLR ADDON: CPT | Performed by: OBSTETRICS & GYNECOLOGY

## 2023-11-13 PROCEDURE — 7100000010 HC PHASE II RECOVERY - FIRST 15 MIN: Performed by: OBSTETRICS & GYNECOLOGY

## 2023-11-13 PROCEDURE — 3700000000 HC ANESTHESIA ATTENDED CARE: Performed by: OBSTETRICS & GYNECOLOGY

## 2023-11-13 PROCEDURE — 57250 REPAIR RECTUM & VAGINA: CPT | Performed by: OBSTETRICS & GYNECOLOGY

## 2023-11-13 PROCEDURE — 6370000000 HC RX 637 (ALT 250 FOR IP): Performed by: ANESTHESIOLOGY

## 2023-11-13 PROCEDURE — 3600000004 HC SURGERY LEVEL 4 BASE: Performed by: OBSTETRICS & GYNECOLOGY

## 2023-11-13 PROCEDURE — 2500000003 HC RX 250 WO HCPCS: Performed by: OBSTETRICS & GYNECOLOGY

## 2023-11-13 PROCEDURE — 2500000003 HC RX 250 WO HCPCS: Performed by: ANESTHESIOLOGY

## 2023-11-13 PROCEDURE — 7100000001 HC PACU RECOVERY - ADDTL 15 MIN: Performed by: OBSTETRICS & GYNECOLOGY

## 2023-11-13 PROCEDURE — 3600000014 HC SURGERY LEVEL 4 ADDTL 15MIN: Performed by: OBSTETRICS & GYNECOLOGY

## 2023-11-13 PROCEDURE — 7100000000 HC PACU RECOVERY - FIRST 15 MIN: Performed by: OBSTETRICS & GYNECOLOGY

## 2023-11-13 PROCEDURE — 6360000002 HC RX W HCPCS: Performed by: NURSE ANESTHETIST, CERTIFIED REGISTERED

## 2023-11-13 PROCEDURE — 2500000003 HC RX 250 WO HCPCS: Performed by: NURSE ANESTHETIST, CERTIFIED REGISTERED

## 2023-11-13 DEVICE — IMPLANTABLE DEVICE: Type: IMPLANTABLE DEVICE | Site: VAGINA | Status: FUNCTIONAL

## 2023-11-13 RX ORDER — POLYETHYLENE GLYCOL 3350 17 G/17G
17 POWDER, FOR SOLUTION ORAL DAILY
Qty: 578 G | Refills: 0 | Status: SHIPPED | OUTPATIENT
Start: 2023-11-13 | End: 2023-12-13

## 2023-11-13 RX ORDER — CEFAZOLIN SODIUM 1 G/3ML
INJECTION, POWDER, FOR SOLUTION INTRAMUSCULAR; INTRAVENOUS PRN
Status: DISCONTINUED | OUTPATIENT
Start: 2023-11-13 | End: 2023-11-13 | Stop reason: SDUPTHER

## 2023-11-13 RX ORDER — PROCHLORPERAZINE EDISYLATE 5 MG/ML
5 INJECTION INTRAMUSCULAR; INTRAVENOUS
Status: DISCONTINUED | OUTPATIENT
Start: 2023-11-13 | End: 2023-11-13 | Stop reason: HOSPADM

## 2023-11-13 RX ORDER — ROCURONIUM BROMIDE 10 MG/ML
INJECTION, SOLUTION INTRAVENOUS PRN
Status: DISCONTINUED | OUTPATIENT
Start: 2023-11-13 | End: 2023-11-13 | Stop reason: SDUPTHER

## 2023-11-13 RX ORDER — MIDAZOLAM HYDROCHLORIDE 1 MG/ML
INJECTION INTRAMUSCULAR; INTRAVENOUS PRN
Status: DISCONTINUED | OUTPATIENT
Start: 2023-11-13 | End: 2023-11-13 | Stop reason: SDUPTHER

## 2023-11-13 RX ORDER — NEOSTIGMINE METHYLSULFATE 1 MG/ML
INJECTION, SOLUTION INTRAVENOUS PRN
Status: DISCONTINUED | OUTPATIENT
Start: 2023-11-13 | End: 2023-11-13 | Stop reason: SDUPTHER

## 2023-11-13 RX ORDER — OXYCODONE HYDROCHLORIDE 5 MG/1
5 TABLET ORAL
Status: DISCONTINUED | OUTPATIENT
Start: 2023-11-13 | End: 2023-11-13 | Stop reason: HOSPADM

## 2023-11-13 RX ORDER — LIDOCAINE HYDROCHLORIDE 20 MG/ML
INJECTION, SOLUTION EPIDURAL; INFILTRATION; INTRACAUDAL; PERINEURAL PRN
Status: DISCONTINUED | OUTPATIENT
Start: 2023-11-13 | End: 2023-11-13 | Stop reason: SDUPTHER

## 2023-11-13 RX ORDER — EPHEDRINE SULFATE/0.9% NACL/PF 50 MG/5 ML
SYRINGE (ML) INTRAVENOUS PRN
Status: DISCONTINUED | OUTPATIENT
Start: 2023-11-13 | End: 2023-11-13 | Stop reason: SDUPTHER

## 2023-11-13 RX ORDER — SODIUM CHLORIDE, SODIUM LACTATE, POTASSIUM CHLORIDE, CALCIUM CHLORIDE 600; 310; 30; 20 MG/100ML; MG/100ML; MG/100ML; MG/100ML
INJECTION, SOLUTION INTRAVENOUS CONTINUOUS
Status: DISCONTINUED | OUTPATIENT
Start: 2023-11-13 | End: 2023-11-13 | Stop reason: HOSPADM

## 2023-11-13 RX ORDER — PHENAZOPYRIDINE HYDROCHLORIDE 95 MG/1
95 TABLET ORAL ONCE
Status: COMPLETED | OUTPATIENT
Start: 2023-11-13 | End: 2023-11-13

## 2023-11-13 RX ORDER — ONDANSETRON 2 MG/ML
INJECTION INTRAMUSCULAR; INTRAVENOUS PRN
Status: DISCONTINUED | OUTPATIENT
Start: 2023-11-13 | End: 2023-11-13 | Stop reason: SDUPTHER

## 2023-11-13 RX ORDER — ACETAMINOPHEN 500 MG
1000 TABLET ORAL ONCE
Status: COMPLETED | OUTPATIENT
Start: 2023-11-13 | End: 2023-11-13

## 2023-11-13 RX ORDER — ONDANSETRON 4 MG/1
4 TABLET, ORALLY DISINTEGRATING ORAL 3 TIMES DAILY PRN
Qty: 10 TABLET | Refills: 0 | Status: SHIPPED | OUTPATIENT
Start: 2023-11-13

## 2023-11-13 RX ORDER — DOCUSATE SODIUM 100 MG/1
100 CAPSULE, LIQUID FILLED ORAL 2 TIMES DAILY
Qty: 60 CAPSULE | Refills: 0 | Status: SHIPPED | OUTPATIENT
Start: 2023-11-13 | End: 2023-12-13

## 2023-11-13 RX ORDER — PROPOFOL 10 MG/ML
INJECTION, EMULSION INTRAVENOUS PRN
Status: DISCONTINUED | OUTPATIENT
Start: 2023-11-13 | End: 2023-11-13 | Stop reason: SDUPTHER

## 2023-11-13 RX ORDER — LIDOCAINE HYDROCHLORIDE AND EPINEPHRINE 10; 10 MG/ML; UG/ML
INJECTION, SOLUTION INFILTRATION; PERINEURAL PRN
Status: DISCONTINUED | OUTPATIENT
Start: 2023-11-13 | End: 2023-11-13 | Stop reason: ALTCHOICE

## 2023-11-13 RX ORDER — HALOPERIDOL 5 MG/ML
1 INJECTION INTRAMUSCULAR
Status: DISCONTINUED | OUTPATIENT
Start: 2023-11-13 | End: 2023-11-13 | Stop reason: HOSPADM

## 2023-11-13 RX ORDER — FENTANYL CITRATE 50 UG/ML
INJECTION, SOLUTION INTRAMUSCULAR; INTRAVENOUS PRN
Status: DISCONTINUED | OUTPATIENT
Start: 2023-11-13 | End: 2023-11-13 | Stop reason: SDUPTHER

## 2023-11-13 RX ORDER — DEXAMETHASONE SODIUM PHOSPHATE 10 MG/ML
INJECTION INTRAMUSCULAR; INTRAVENOUS PRN
Status: DISCONTINUED | OUTPATIENT
Start: 2023-11-13 | End: 2023-11-13 | Stop reason: SDUPTHER

## 2023-11-13 RX ORDER — IPRATROPIUM BROMIDE AND ALBUTEROL SULFATE 2.5; .5 MG/3ML; MG/3ML
1 SOLUTION RESPIRATORY (INHALATION)
Status: DISCONTINUED | OUTPATIENT
Start: 2023-11-13 | End: 2023-11-13 | Stop reason: HOSPADM

## 2023-11-13 RX ORDER — GLYCOPYRROLATE 0.2 MG/ML
INJECTION INTRAMUSCULAR; INTRAVENOUS PRN
Status: DISCONTINUED | OUTPATIENT
Start: 2023-11-13 | End: 2023-11-13 | Stop reason: SDUPTHER

## 2023-11-13 RX ORDER — LIDOCAINE HYDROCHLORIDE 10 MG/ML
1 INJECTION, SOLUTION INFILTRATION; PERINEURAL
Status: COMPLETED | OUTPATIENT
Start: 2023-11-13 | End: 2023-11-13

## 2023-11-13 RX ORDER — IMATINIB MESYLATE 400 MG/1
TABLET, FILM COATED ORAL
Qty: 30 TABLET | Refills: 5 | OUTPATIENT
Start: 2023-11-13

## 2023-11-13 RX ADMIN — Medication 3 MG: at 11:57

## 2023-11-13 RX ADMIN — PHENYLEPHRINE HYDROCHLORIDE 100 MCG: 0.1 INJECTION, SOLUTION INTRAVENOUS at 11:40

## 2023-11-13 RX ADMIN — FENTANYL CITRATE 100 MCG: 50 INJECTION, SOLUTION INTRAMUSCULAR; INTRAVENOUS at 10:19

## 2023-11-13 RX ADMIN — PHENYLEPHRINE HYDROCHLORIDE 100 MCG: 0.1 INJECTION, SOLUTION INTRAVENOUS at 11:00

## 2023-11-13 RX ADMIN — PROPOFOL 180 MG: 10 INJECTION, EMULSION INTRAVENOUS at 10:19

## 2023-11-13 RX ADMIN — DEXAMETHASONE SODIUM PHOSPHATE 8 MG: 10 INJECTION INTRAMUSCULAR; INTRAVENOUS at 10:40

## 2023-11-13 RX ADMIN — URINARY PAIN RELIEF 95 MG: 95 TABLET ORAL at 09:11

## 2023-11-13 RX ADMIN — MIDAZOLAM 2 MG: 1 INJECTION INTRAMUSCULAR; INTRAVENOUS at 10:10

## 2023-11-13 RX ADMIN — ACETAMINOPHEN 1000 MG: 500 TABLET, FILM COATED ORAL at 12:34

## 2023-11-13 RX ADMIN — LIDOCAINE HYDROCHLORIDE 1 ML: 10 INJECTION, SOLUTION INFILTRATION; PERINEURAL at 09:21

## 2023-11-13 RX ADMIN — PHENYLEPHRINE HYDROCHLORIDE 100 MCG: 0.1 INJECTION, SOLUTION INTRAVENOUS at 11:18

## 2023-11-13 RX ADMIN — CEFAZOLIN 2 G: 330 INJECTION, POWDER, FOR SOLUTION INTRAMUSCULAR; INTRAVENOUS at 10:37

## 2023-11-13 RX ADMIN — PHENYLEPHRINE HYDROCHLORIDE 100 MCG: 0.1 INJECTION, SOLUTION INTRAVENOUS at 11:05

## 2023-11-13 RX ADMIN — ROCURONIUM BROMIDE 40 MG: 10 INJECTION, SOLUTION INTRAVENOUS at 10:19

## 2023-11-13 RX ADMIN — FENTANYL CITRATE 50 MCG: 50 INJECTION, SOLUTION INTRAMUSCULAR; INTRAVENOUS at 11:57

## 2023-11-13 RX ADMIN — SODIUM CHLORIDE, SODIUM LACTATE, POTASSIUM CHLORIDE, AND CALCIUM CHLORIDE: 600; 310; 30; 20 INJECTION, SOLUTION INTRAVENOUS at 09:22

## 2023-11-13 RX ADMIN — Medication 12.5 MG: at 10:59

## 2023-11-13 RX ADMIN — LIDOCAINE HYDROCHLORIDE 80 MG: 20 INJECTION, SOLUTION EPIDURAL; INFILTRATION; INTRACAUDAL; PERINEURAL at 10:19

## 2023-11-13 RX ADMIN — ONDANSETRON 4 MG: 2 INJECTION INTRAMUSCULAR; INTRAVENOUS at 11:57

## 2023-11-13 RX ADMIN — GLYCOPYRROLATE 0.4 MG: 0.2 INJECTION INTRAMUSCULAR; INTRAVENOUS at 11:57

## 2023-11-13 RX ADMIN — SODIUM CHLORIDE, SODIUM LACTATE, POTASSIUM CHLORIDE, AND CALCIUM CHLORIDE: 600; 310; 30; 20 INJECTION, SOLUTION INTRAVENOUS at 10:08

## 2023-11-13 ASSESSMENT — PAIN DESCRIPTION - LOCATION: LOCATION: HEAD

## 2023-11-13 ASSESSMENT — PAIN SCALES - GENERAL
PAINLEVEL_OUTOF10: 4
PAINLEVEL_OUTOF10: 3

## 2023-11-13 ASSESSMENT — PAIN DESCRIPTION - DESCRIPTORS: DESCRIPTORS: ACHING

## 2023-11-13 ASSESSMENT — PAIN - FUNCTIONAL ASSESSMENT: PAIN_FUNCTIONAL_ASSESSMENT: 0-10

## 2023-11-13 ASSESSMENT — PAIN DESCRIPTION - PAIN TYPE: TYPE: ACUTE PAIN

## 2023-11-13 NOTE — ANESTHESIA PRE PROCEDURE
Department of Anesthesiology  Preprocedure Note       Name:  Nancy Posada   Age:  59 y.o.  :  1960                                          MRN:  [id number]         Date:  2023      Surgeon: Griselda Worthy):  Srikanth Alvarez DO    Procedure: Procedure(s):  native tissue posterior wall and apical prolapse repair, possible graft. Skin biopsy for Lichen Sclerosis  SACROSPINOUS LIGAMENT SUSPENSION    Medications prior to admission:   Prior to Admission medications    Medication Sig Start Date End Date Taking? Authorizing Provider   hydroCHLOROthiazide (MICROZIDE) 12.5 MG capsule Take 1 capsule by mouth daily 23  Yes Anne Basurto MD   ACETAMINOPHEN-CAFFEINE PO Take by mouth   Yes Anne Basurto MD   sertraline (ZOLOFT) 50 MG tablet Take 0.5 tablets by mouth nightly 23   Anne Basurto MD   diazePAM (VALIUM) 2 MG tablet Take 0.5 tablets by mouth daily as needed for Anxiety. Patient not taking: Reported on 2023    Anne Basurto MD   dorzolamide-timolol (COSOPT) 22.3-6.8 MG/ML ophthalmic solution LOCATION: RIGHT EYE. INSTILL ONE DROP INTO THE RIGHT EYE TWICE DAILY. 23   Anne Basurto MD   ketorolac (TORADOL) 10 MG tablet TAKE 1 TABLET (10 MG) BY MOUTH EVERY 6 HOURS AS NEEDED FOR SEVERE PAIN FOR UP TO 5 DAYS  Patient not taking: Reported on 2023   Anne Basurto MD   imatinib (GLEEVEC) 400 MG chemo tablet Take 1 tablet by mouth daily TAKE 1 TABLET BY MOUTH ONCE DAILY WITH FOOD AND WATER. MAY CAUSE NAUSEA, VOMITING, MUSCLE PAIN AND EDEMA. AVOID GRAPEFRUIT. Patient taking differently: Take 1 tablet by mouth Daily with supper TAKE 1 TABLET BY MOUTH ONCE DAILY WITH FOOD AND WATER. MAY CAUSE NAUSEA, VOMITING, MUSCLE PAIN AND EDEMA.  AVOID GRAPEFRUIT. 23   Chelsie Chandra MD   cyanocobalamin 1000 MCG tablet Take by mouth daily  Patient not taking: Reported on 2023    Anne Basurto MD   levocetirizine (XYZAL) 5 MG tablet Take

## 2023-11-13 NOTE — ANESTHESIA POSTPROCEDURE EVALUATION
Department of Anesthesiology  Postprocedure Note    Patient: Ivon Hayden  MRN: [id number]  YOB: 1960  Date of evaluation: 11/13/2023      Procedure Summary     Date: 11/13/23 Room / Location: [facility] MAIN OR 03 / [facility] MAIN OR    Anesthesia Start: 1008 Anesthesia Stop: 6069    Procedures:       native tissue posterior wall and apical prolapse repair, graft. Skin biopsy for Lichen Sclerosis (Pelvis)      SACROSPINOUS LIGAMENT SUSPENSION (Pelvis) Diagnosis:       Posterior vaginal wall prolapse      Lichen sclerosus of female genitalia      (Posterior vaginal wall prolapse [Q98.6])      (Lichen sclerosus of female genitalia [N90.4])    Surgeons: Martha Guillen DO Responsible Provider: Ebonie Ray MD    Anesthesia Type: general ASA Status: 3          Anesthesia Type: No value filed.     Steffanie Phase I: Steffanie Score: 10    Steffanie Phase II: Steffanie Score: 10      Anesthesia Post Evaluation    Patient location during evaluation: PACU  Patient participation: complete - patient participated  Level of consciousness: awake  Airway patency: patent  Nausea & Vomiting: no nausea  Complications: no  Cardiovascular status: blood pressure returned to baseline and hemodynamically stable  Respiratory status: acceptable  Hydration status: stable  Multimodal analgesia pain management approach  Pain management: adequate

## 2023-11-13 NOTE — PERIOP NOTE
The roberts bag was removed from the indwelling catheter and 300 cc of sterile water was instilled into the bladder at 1252. The roberts catheter was then removed at 1253. The patient was asked to void. She was able to void 300 cc at 1315. The patient was discharged without a roberts catheter. Pt VSS stable. Patient ambulating without difficulty. Pain and Nausea controlled at this time. Plan of care continues.

## 2023-11-13 NOTE — OP NOTE
NAMES OF PROCEDURES:   1. Extraperitoneal vaginal colpopexy using the iliococcygeus fascial suspension  2. Posterior Repair  3. Lateral paravaginal defect repair  4. Perineorrhaphy  5. Use of Graft (Coloplast [place] Dermis) for repair  6. Perineal biopsy    PREOPERATIVE DIAGNOSES:   Recurrence of Rectocele and Perineal defect  Recurrence of Apical repair  Concern for Lichen Sclerosis    POSTOPERATIVE DIAGNOSIS:   Recurrence of Rectocele and Perineal defect  2. Recurrence of Apical repair  3. Concern for Lichen Sclerosis    EBL: 50cc    IVF: 1700cc    URINE OUTPUT: 1000cc    Specimen: Perineal Biosy Specimen    Complications: none    Paul Gonzáles MD      FINDINGS:  Normal rectal exam. No masses, lesions, or suture in the rectum    Indications for procedure: This is a 59year old female with symptomatic recurrence of prolapse. She has elected to have this surgically corrected after being extensively counseled on the risks, benefits, indications and alternatives of the procedure. Risks reviewed include bleeding, infection, damage to the bladder, ureters, urethra, bowel, blood vessels, nerves, postoperative urinary retention, postoperative incontinence, pelvic pain, dyspareunia, recurrence, mesh erosion, anesthetic complications and death. The patient expressed understanding and informed consent was obtained. DESCRIPTION OF PROCEDURE: The patient was brought to the operating room, where she was placed in the supine position. Once in the supine position, she was placed under general anesthesia with an endotracheal tube. She was then placed in the dorsal lithotomy position with the Yellofins stirrups, making sure that her ankles, knees and hips were in alignment toward the contralateral shoulders. She was prepped and draped in normal sterile fashion. A 16-Welsh Escobar catheter was placed in the patient's bladder. [place] retractor was placed on the patient's pelvis, with hooks along the vaginal epithelium.

## 2023-11-13 NOTE — PERIOP NOTE
Kiya Coleman [phone]  Belongings in Pre-Op    Dr. Tolu Love may talk with pt's son - Dnaa Figueroa, daughter-in-law - Clemente Brady, or friend - Kaden Dhillon.

## 2023-11-15 DIAGNOSIS — C92.10 CML (CHRONIC MYELOID LEUKEMIA) (HCC): ICD-10-CM

## 2023-11-15 RX ORDER — IMATINIB MESYLATE 400 MG/1
400 TABLET, FILM COATED ORAL DAILY
Qty: 30 TABLET | Refills: 5 | Status: ACTIVE | OUTPATIENT
Start: 2023-11-15

## 2023-11-16 ENCOUNTER — APPOINTMENT (OUTPATIENT)
Dept: PHYSICAL THERAPY | Age: 63
End: 2023-11-16
Payer: MEDICARE

## 2023-11-17 ENCOUNTER — TELEPHONE (OUTPATIENT)
Dept: UROGYNECOLOGY | Age: 63
End: 2023-11-17

## 2023-11-17 RX ORDER — KETOROLAC TROMETHAMINE 10 MG/1
10 TABLET, FILM COATED ORAL EVERY 6 HOURS PRN
Qty: 10 TABLET | Refills: 0 | Status: SHIPPED | OUTPATIENT
Start: 2023-11-17 | End: 2024-11-16

## 2023-11-17 RX ORDER — IMATINIB MESYLATE 400 MG/1
TABLET, FILM COATED ORAL
Qty: 30 TABLET | OUTPATIENT
Start: 2023-11-17

## 2023-11-17 NOTE — TELEPHONE ENCOUNTER
I called Gracie Patient to see how she has been doing since her surgery. She denies fevers, chills, nausea, vomiting, chest pain, and shortness of breath. She is tolerating a regular diet. Her pain is well controlled on tylenol, ibuprofen, and tramadol. She is requesting toradol instead for some hip pain she is having. I read her the risk of GI bleed with zoloft and toradol. She has taken this before and knows the risk. She is ambulating. She has had a bowel movement. She has been taking colace and miralax as instructed. All of her questions and concerns were addressed. We will follow up at her post-operative appointment.        Toradol 10mg x10 pills sent to pharmacy

## 2023-11-28 NOTE — PROGRESS NOTES
Jack Malave  : 1960  Primary: [address] (Medicare Managed)  Secondary:  [facility]  [address] Destiny Ayala [address] [place] [id number]  Phone: [phone]  Fax: [phone] Plan Frequency: one time a week for 90 days  Plan of Care/Certification Expiration Date: 24      >PT Visit Info:  Plan Frequency: one time a week for 90 days  Plan of Care/Certification Expiration Date: 24      Visit Count:  7    OUTPATIENT PHYSICAL THERAPY:OP NOTE TYPE: OP Note Type: Treatment Note 2023       Episode  }Appt Desk             Treatment Diagnosis:    Muscle weakness (generalized)  Pelvic floor dysfunction in female  Other lack of coordination  Rectocele      Medical/Referring Diagnosis:  No admission diagnoses are documented for this encounter. Referring Physician:  Eda Luna DO MD Orders:  PT Eval and Treat   Date of Onset:  No data recorded   Allergies:   Latex, Hydrocodone-acetaminophen, Iodine, and Meperidine  Restrictions/Precautions:  No data recordedNo data recorded     Interventions Planned (Treatment may consist of any combination of the following):    Current Treatment Recommendations: Strengthening; ROM; ADL/Self-care training; Neuromuscular re-education; Manual; Home exercise program; Patient/Caregiver education & training; Equipment evaluation, education, & procurement; Safety education & training; Positioning; Therapeutic activities     >Subjective Comments: Patient reports surgery went well. She is sore. She is doing two stool softeners a day and miralax as well to decrease pressure. If she doesn't do a lot of stuff pain is about a 4. She was sore she couldn't sit up. Was given toradal but not taking it regularly. She is able to control her gas better. She can tell a difference in having a bowel movement.       >Initial:     4/10>Post Session:        /10  Medications Last Reviewed:  2023  Updated Objective Findings:    Ms. Karen Mcneal is a 58 yo female today:  None  Recommendations/Intent for next treatment session: Next visit will focus on post op symptoms. >Total Treatment Billable Duration:  38 min there ex   Time In: 1300  Time Out: [address] [facility].  Judson Centeno, PT       Charge Capture  }Post Session Pain  PT Visit Info  [address] Portal  MD Guidelines  Scanned Media  Benefits  MyChart    Future Appointments   Date Time Provider [address]   12/6/2023  1:20 PM PERIPHERAL [facility]   12/13/2023  1:30 PM ZAIDA Morales - CNP [facility]   12/14/2023  2:45 PM Niurka Diggs DO [facility]   3/6/2024  2:00 PM PERIPHERAL [facility] [address] [facility]   3/13/2024  3:00 PM Jessica Jones MD [facility]

## 2023-11-30 ENCOUNTER — HOSPITAL ENCOUNTER (OUTPATIENT)
Dept: PHYSICAL THERAPY | Age: 63
Setting detail: RECURRING SERIES
End: 2023-11-30
Payer: MEDICARE

## 2023-11-30 PROCEDURE — 97110 THERAPEUTIC EXERCISES: CPT

## 2023-11-30 ASSESSMENT — PAIN SCALES - GENERAL: PAINLEVEL_OUTOF10: 4

## 2023-11-30 NOTE — THERAPY RECERTIFICATION
demonstrate progress in strength, range of motion, coordination, and functional technique to increase independence with symptom management . Reason For Services/Other Comments:  > Patient continues to require skilled intervention due to above mentioned deficits. Total Duration:       Regarding Yuan Correia's therapy, I certify that the treatment plan above will be carried out by a therapist or under their direction. Thank you for this referral,  Tiana [place].  Mayra Hurt     Referring Physician Signature: Luli Ramirez DO _______________________________ Date _____________        Post Session Pain  Charge Capture  PT Visit Info MD Guidelines  MyChart

## 2023-12-13 ENCOUNTER — HOSPITAL ENCOUNTER (OUTPATIENT)
Dept: LAB | Age: 63
Discharge: HOME | End: 2023-12-16
Payer: MEDICARE

## 2023-12-13 ENCOUNTER — OFFICE VISIT (OUTPATIENT)
Dept: ONCOLOGY | Age: 63
End: 2023-12-13
Payer: MEDICARE

## 2023-12-13 VITALS
HEART RATE: 73 BPM | RESPIRATION RATE: 18 BRPM | BODY MASS INDEX: 31.65 KG/M2 | DIASTOLIC BLOOD PRESSURE: 81 MMHG | OXYGEN SATURATION: 99 % | WEIGHT: 190 LBS | TEMPERATURE: 97.9 F | SYSTOLIC BLOOD PRESSURE: 141 MMHG | HEIGHT: 65 IN

## 2023-12-13 DIAGNOSIS — D56.1 BETA THALASSEMIA (HCC): ICD-10-CM

## 2023-12-13 DIAGNOSIS — C92.10 CML (CHRONIC MYELOID LEUKEMIA) (HCC): Primary | ICD-10-CM

## 2023-12-13 DIAGNOSIS — C92.10 CML (CHRONIC MYELOID LEUKEMIA) (HCC): ICD-10-CM

## 2023-12-13 DIAGNOSIS — Z79.899 HIGH RISK MEDICATION USE: ICD-10-CM

## 2023-12-13 LAB
ALBUMIN SERPL-MCNC: 3.6 G/DL (ref 3.2–4.6)
ALBUMIN/GLOB SERPL: 1 (ref 0.4–1.6)
ALP SERPL-CCNC: 102 U/L (ref 50–136)
ALT SERPL-CCNC: 21 U/L (ref 12–65)
ANION GAP SERPL CALC-SCNC: 4 MMOL/L (ref 2–11)
AST SERPL-CCNC: 22 U/L (ref 15–37)
BASOPHILS # BLD: 0.1 K/UL (ref 0–0.2)
BASOPHILS NFR BLD: 1 % (ref 0–2)
BILIRUB SERPL-MCNC: 0.4 MG/DL (ref 0.2–1.1)
BUN SERPL-MCNC: 11 MG/DL (ref 8–23)
CALCIUM SERPL-MCNC: 9 MG/DL (ref 8.3–10.4)
CHLORIDE SERPL-SCNC: 107 MMOL/L (ref 103–113)
CO2 SERPL-SCNC: 29 MMOL/L (ref 21–32)
CREAT SERPL-MCNC: 0.9 MG/DL (ref 0.6–1)
DIFFERENTIAL METHOD BLD: ABNORMAL
EOSINOPHIL # BLD: 0.3 K/UL (ref 0–0.8)
EOSINOPHIL NFR BLD: 5 % (ref 0.5–7.8)
ERYTHROCYTE [DISTWIDTH] IN BLOOD BY AUTOMATED COUNT: 14.9 % (ref 11.9–14.6)
GLOBULIN SER CALC-MCNC: 3.5 G/DL (ref 2.8–4.5)
GLUCOSE SERPL-MCNC: 113 MG/DL (ref 65–100)
HCT VFR BLD AUTO: 32.4 % (ref 35.8–46.3)
HGB BLD-MCNC: 9.8 G/DL (ref 11.7–15.4)
IMM GRANULOCYTES # BLD AUTO: 0 K/UL (ref 0–0.5)
IMM GRANULOCYTES NFR BLD AUTO: 0 % (ref 0–5)
LYMPHOCYTES # BLD: 2.4 K/UL (ref 0.5–4.6)
LYMPHOCYTES NFR BLD: 32 % (ref 13–44)
Lab: NORMAL
Lab: NORMAL
MCH RBC QN AUTO: 22.1 PG (ref 26.1–32.9)
MCHC RBC AUTO-ENTMCNC: 30.2 G/DL (ref 31.4–35)
MCV RBC AUTO: 73 FL (ref 82–102)
MONOCYTES # BLD: 0.7 K/UL (ref 0.1–1.3)
MONOCYTES NFR BLD: 9 % (ref 4–12)
NEUTS SEG # BLD: 4 K/UL (ref 1.7–8.2)
NEUTS SEG NFR BLD: 53 % (ref 43–78)
NRBC # BLD: 0 K/UL (ref 0–0.2)
PLATELET # BLD AUTO: 299 K/UL (ref 150–450)
PMV BLD AUTO: 10.3 FL (ref 9.4–12.3)
POTASSIUM SERPL-SCNC: 4.2 MMOL/L (ref 3.5–5.1)
PROT SERPL-MCNC: 7.1 G/DL (ref 6.3–8.2)
RBC # BLD AUTO: 4.44 M/UL (ref 4.05–5.2)
REFERENCE LAB: NORMAL
SODIUM SERPL-SCNC: 140 MMOL/L (ref 136–146)
WBC # BLD AUTO: 7.5 K/UL (ref 4.3–11.1)

## 2023-12-13 PROCEDURE — 99214 OFFICE O/P EST MOD 30 MIN: CPT

## 2023-12-13 PROCEDURE — 85025 COMPLETE CBC W/AUTO DIFF WBC: CPT

## 2023-12-13 PROCEDURE — 80053 COMPREHEN METABOLIC PANEL: CPT

## 2023-12-13 PROCEDURE — 36415 COLL VENOUS BLD VENIPUNCTURE: CPT

## 2023-12-13 ASSESSMENT — PATIENT HEALTH QUESTIONNAIRE - PHQ9
SUM OF ALL RESPONSES TO PHQ QUESTIONS 1-9: 0
1. LITTLE INTEREST OR PLEASURE IN DOING THINGS: 0
SUM OF ALL RESPONSES TO PHQ QUESTIONS 1-9: 0
2. FEELING DOWN, DEPRESSED OR HOPELESS: 0
SUM OF ALL RESPONSES TO PHQ9 QUESTIONS 1 & 2: 0

## 2023-12-14 ENCOUNTER — OFFICE VISIT (OUTPATIENT)
Dept: UROGYNECOLOGY | Age: 63
End: 2023-12-14

## 2023-12-14 DIAGNOSIS — N81.3 UTEROVAGINAL PROLAPSE, COMPLETE: Primary | ICD-10-CM

## 2023-12-14 PROCEDURE — 99024 POSTOP FOLLOW-UP VISIT: CPT | Performed by: OBSTETRICS & GYNECOLOGY

## 2023-12-14 RX ORDER — ESTRADIOL 0.1 MG/G
1 CREAM VAGINAL DAILY
Qty: 42.5 G | Refills: 3 | Status: SHIPPED | OUTPATIENT
Start: 2023-12-14

## 2023-12-14 NOTE — ASSESSMENT & PLAN NOTE
For another 4 weeks:  No heavy lifting ( greater than 5 lbs)  No bathing/ soaking. No hot tubs etc. You may shower. Nothing in the vagina (sex, tampons, douching)    You may slowly start to resume your normal activity. Please use vaginal estrogen 2-3 times per weeks    You have sutures in the vagina that may start to come out (this is normal) they look like white thread.

## 2023-12-14 NOTE — PROGRESS NOTES
CML.      PLAN:  I reviewed the results of most recent labs with the patient. BCR-ABL 1 transcript was 0.013 on 9/5/2023 consistent with continued MMR. She is tolerating treatment with Gleevec which will be continued at a dose of 400 mg p.o. daily. She is contemplating repair of posterior prolapse in coming few months. Moderate microcytic anemia likely due to underlying beta thalassemia trait has remained stable (hemoglobin is 9.9 on most recent labs). If deemed necessary by her surgeon, Mk Addison may be held for a week prior to the procedure. I shall plan to see her back in about 3 months with labs including BCR ABL 1 by RT-PCR prior. Total visit time 30 minutes. Ms. Zeny Tamayo returns for toxicity evaluation and review of labs. She has been taking imatinib compliantly at a dose of 400 mg p.o. daily. She has been doing well. She most recently had a surgery with Dr. Carola Woods (one month ago) and will return to see her tomorrow for FU. She denies any sx of bleeding. Only pain she c/o is post-op pain. No SOB or peripheral edema. No fevers or infectious sx. BP initially elevated today d/t stress r/t lab appt but when rechecked, her BP was improved. Labs reviewed and stable. All questions answered to the best of my ability. She will call our office with any questions/concerns. She will return in three months for next FU. [facility]  [address]  Office : [phone]  Fax : [phone]    Elements of this note have been dictated using speech recognition software. As a result, errors of speech recognition may have occurred.

## 2023-12-14 NOTE — PROGRESS NOTES
2   Posterior Fornix (D) x            1. Uterovaginal prolapse, complete  Assessment & Plan: For another 4 weeks:  No heavy lifting ( greater than 5 lbs)  No bathing/ soaking. No hot tubs etc. You may shower. Nothing in the vagina (sex, tampons, douching)    You may slowly start to resume your normal activity. Please use vaginal estrogen 2-3 times per weeks    You have sutures in the vagina that may start to come out (this is normal) they look like white thread. No follow-up provider specified.           Blaise Kellogg, DO

## 2024-01-21 NOTE — PROGRESS NOTES
tissue mobilization in clockwise on abdomin, sustained pressure release on B QL's. Unable to tolerate diaphragmatic release on R side due to chronic rib pain.                                       (Used abbreviations: MET - muscle energy technique; SCS- Strain counter strain; CTM-Connective tissue mobilizations; CR- Contract/Relax; SP- Sustained pressure; PIT- Positional inhibition techniques; STM Soft -tissue mobilization; MM- Myofascial mobilization; TrP-Trigger point release; IASTM- Instrument assisted soft tissue mobilizations, TDN-Trigger point dry needling)    Pt gives verbal consent to internal vaginal assessment/treatment without chaperon present.    NEURO REEDUCATION: ( minutes) to improve control and coordination of pelvic floor     Date:  10-12-23 Date:   Date:     Activity/Exercise Parameters Parameters Parameters   Biofeedback With sEMG was utilized for coordination of PFM. Supine, sitting, standing                                               THERAPEUTIC ACTIVITY: ( minutes):     TA Educational Topic Notes Date Completed   Pathology/Anatomy/PFM Function  10-3-23   Bladder health education     Urinary urge suppression     The knack     Voiding strategies     Nocturia tips     Bowel/Bladder log     Bowel health education     Constipation care     Diarrhea/Fecal leakage     Colonic massage     Toilet positioning     Defecation dynamics     Sources of fiber     Return to intercourse/Dilator training     Sexual positioning     Lubricants/vaginal moisturizers     Vaginal irritants     Body mechanics     Posture/ergonomics Reviewed bridge and TA activation for correction of exercise    Discussed \"letting go\" of abs 10-3-23    10-12-23   Diaphragmatic breathing Reviewed and performed, added shell 10-3-23   Resources and technology     HEP Drops and reviewed breathing 10-12-23        Treatment/Session Summary:        >Total Treatment Billable Duration:  53 min there ex   Time In: 1400  Time Out:

## 2024-01-23 ENCOUNTER — HOSPITAL ENCOUNTER (OUTPATIENT)
Dept: PHYSICAL THERAPY | Age: 64
Setting detail: RECURRING SERIES
Discharge: HOME | End: 2024-01-26
Payer: MEDICARE

## 2024-01-23 PROCEDURE — 97110 THERAPEUTIC EXERCISES: CPT

## 2024-01-23 NOTE — THERAPY DISCHARGE
Florence Correia  : 1960  Primary: [employer] (Medicare Managed)  Secondary:  [facility]  [address]  [address]  [facility] [address]  Phone: [phone]  Fax: [phone] Plan Frequency: one time a week for 90 days    Plan of Care/Certification Expiration Date: 24      PT Visit Info:  Plan Frequency: one time a week for 90 days  Plan of Care/Certification Expiration Date: 24      Visit Count:  8                OUTPATIENT PHYSICAL THERAPY:             Discharge Summary 2024               Episode (rectocele) Appt Desk         Treatment Diagnosis:    No data found    Medical/Referring Diagnosis:  No admission diagnoses are documented for this encounter.  Referring Physician:  Niurka Diggs DO MD Orders:  PT Eval and Treat   Return MD Appt:  -  Date of Onset:       Allergies:  Latex, Hydrocodone-acetaminophen, Iodine, and Meperidine  Restrictions/Precautions:           Medications Last Reviewed:  2024     SUBJECTIVE   History of Injury/Illness (Reason for Referral):  Ms. Correia is a 64 yo female referred to pelvic floor physical therapy (PFPT) by Niurka Diggs DO  No admission diagnoses are documented for this encounter..  Patient reports she is having a surgery in October for rectocele. She has a son that weighed 10 lbs and tore all the way through. She had over 300 stitches. She bled out. Had 4 units of blood in 24 hours. She had another child and that one went well. They made sure she didn't have clots. She had a hysterectomy in  and did a cystocele and rectocele repair. She hasn't had any other traumas since then. She tried practicing kegels over the years. The rectocele has gone completely. We she wipes she can feel the bulge. When she used to have intercourse and would orgasm she would have doubled over pain in her right lower abdomen. If she has to go to the bathroom (bowel movement) she has to go NOW. She started on medicine for her

## 2024-03-05 ENCOUNTER — HOSPITAL ENCOUNTER (OUTPATIENT)
Dept: LAB | Age: 64
Discharge: HOME | End: 2024-03-08
Payer: MEDICARE

## 2024-03-05 DIAGNOSIS — C92.10 CML (CHRONIC MYELOID LEUKEMIA) (HCC): ICD-10-CM

## 2024-03-05 LAB
ALBUMIN SERPL-MCNC: 3.7 G/DL (ref 3.2–4.6)
ALBUMIN/GLOB SERPL: 1 (ref 0.4–1.6)
ALP SERPL-CCNC: 93 U/L (ref 50–136)
ALT SERPL-CCNC: 27 U/L (ref 12–65)
ANION GAP SERPL CALC-SCNC: 2 MMOL/L (ref 2–11)
BASOPHILS # BLD: 0.1 K/UL (ref 0–0.2)
BASOPHILS NFR BLD: 1 % (ref 0–2)
BILIRUB SERPL-MCNC: 0.4 MG/DL (ref 0.2–1.1)
CALCIUM SERPL-MCNC: 8.7 MG/DL (ref 8.3–10.4)
CO2 SERPL-SCNC: 30 MMOL/L (ref 21–32)
CREAT SERPL-MCNC: 0.9 MG/DL (ref 0.6–1)
DIFFERENTIAL METHOD BLD: ABNORMAL
EOSINOPHIL # BLD: 0.3 K/UL (ref 0–0.8)
EOSINOPHIL NFR BLD: 4 % (ref 0.5–7.8)
ERYTHROCYTE [DISTWIDTH] IN BLOOD BY AUTOMATED COUNT: 15.9 % (ref 11.9–14.6)
GLOBULIN SER CALC-MCNC: 3.8 G/DL (ref 2.8–4.5)
GLUCOSE SERPL-MCNC: 130 MG/DL (ref 65–100)
HCT VFR BLD AUTO: 32 % (ref 35.8–46.3)
HGB BLD-MCNC: 9.7 G/DL (ref 11.7–15.4)
IMM GRANULOCYTES # BLD AUTO: 0.1 K/UL (ref 0–0.5)
IMM GRANULOCYTES NFR BLD AUTO: 1 % (ref 0–5)
LYMPHOCYTES # BLD: 2.9 K/UL (ref 0.5–4.6)
LYMPHOCYTES NFR BLD: 35 % (ref 13–44)
MCH RBC QN AUTO: 22 PG (ref 26.1–32.9)
MCHC RBC AUTO-ENTMCNC: 30.3 G/DL (ref 31.4–35)
MCV RBC AUTO: 72.6 FL (ref 82–102)
MONOCYTES # BLD: 0.7 K/UL (ref 0.1–1.3)
MONOCYTES NFR BLD: 9 % (ref 4–12)
NEUTS SEG NFR BLD: 50 % (ref 43–78)
PLATELET # BLD AUTO: 308 K/UL (ref 150–450)
PMV BLD AUTO: 10.2 FL (ref 9.4–12.3)
PROT SERPL-MCNC: 7.5 G/DL (ref 6.3–8.2)
WBC # BLD AUTO: 8.4 K/UL (ref 4.3–11.1)

## 2024-03-05 PROCEDURE — 36415 COLL VENOUS BLD VENIPUNCTURE: CPT

## 2024-03-05 PROCEDURE — 85025 COMPLETE CBC W/AUTO DIFF WBC: CPT

## 2024-03-05 PROCEDURE — 80053 COMPREHEN METABOLIC PANEL: CPT

## 2024-03-13 ENCOUNTER — OFFICE VISIT (OUTPATIENT)
Dept: ONCOLOGY | Age: 64
End: 2024-03-13
Payer: MEDICARE

## 2024-03-13 VITALS
WEIGHT: 189.6 LBS | HEART RATE: 82 BPM | BODY MASS INDEX: 31.59 KG/M2 | DIASTOLIC BLOOD PRESSURE: 90 MMHG | OXYGEN SATURATION: 98 % | HEIGHT: 65 IN | RESPIRATION RATE: 16 BRPM | SYSTOLIC BLOOD PRESSURE: 135 MMHG | TEMPERATURE: 98.9 F

## 2024-03-13 DIAGNOSIS — C92.10 CML (CHRONIC MYELOID LEUKEMIA) (HCC): ICD-10-CM

## 2024-03-13 PROCEDURE — 99214 OFFICE O/P EST MOD 30 MIN: CPT | Performed by: INTERNAL MEDICINE

## 2024-03-13 RX ORDER — LOSARTAN POTASSIUM 25 MG/1
25 TABLET ORAL DAILY
COMMUNITY
Start: 2024-01-08 | End: 2025-01-07

## 2024-03-13 RX ORDER — IMATINIB MESYLATE 400 MG/1
400 TABLET, FILM COATED ORAL DAILY
Qty: 30 TABLET | Refills: 5 | Status: SHIPPED | OUTPATIENT
Start: 2024-03-13

## 2024-03-13 ASSESSMENT — PATIENT HEALTH QUESTIONNAIRE - PHQ9
2. FEELING DOWN, DEPRESSED OR HOPELESS: 0
SUM OF ALL RESPONSES TO PHQ QUESTIONS 1-9: 0
SUM OF ALL RESPONSES TO PHQ9 QUESTIONS 1 & 2: 0
SUM OF ALL RESPONSES TO PHQ QUESTIONS 1-9: 0
1. LITTLE INTEREST OR PLEASURE IN DOING THINGS: 0

## 2024-03-13 NOTE — PATIENT INSTRUCTIONS
Patient Information from Today's Visit        Follow Up: Office visit    Plan: Continue Gleevec    Please refer to After Visit Summary or [employer] for upcoming appointment information. If you have any questions regarding your upcoming schedule please reach out to your care team through [employer]et or call [phone].      -------------------------------------------------------------------------------------------------------------------  Please call our office at [phone] if you have any  of the following symptoms:   Fever of 100.5 or greater  Chills  Shortness of breath  Swelling or pain in one leg    After office hours an answering service is available and will contact a provider for emergencies or if you are experiencing any of the above symptoms.    Patient does express an interest in My Chart.  My Chart log in information explained on the after visit summary printout at the check-out desk.    Nikolai Valiente RN

## 2024-03-13 NOTE — PROGRESS NOTES
Oral Chemotherapy Adherence:     Current Regimen:  Drug Name: Imatinib   Dose: 400mg  Frequency: daily    Barriers to care identified including (financial, physical, psychosocial) : No    Missed doses reported: No    Patient verbalizes understanding of what to do in the event of a missed dose: Yes    Adverse reactions/toxicities reported:None, See Review of Systems             
rechecked, her BP was improved. Labs reviewed and stable. All questions answered to the best of my ability. She will call our office with any questions/concerns. She will return in three months for next FU.     3/13/2024: She has been tolerating imatinib without any major toxicity.  Hemoglobin stable, WBC and platelets normal.  BCR-ABL 1 quantification on 3/5/2024 noted a value of 0.0078% consistent with ongoing MMR.  Continue with imatinib 400 mg p.o. daily.  New prescription has been entered.  Patient is recovering from recent episode of bacterial rhinosinusitis which is being treated with a course of Augmentin.  She reports fatigue which is likely related to the aforementioned infection.  She denies constitutional symptoms, early satiety, spontaneous bleeding or bruising.  Return office visit in 3 months for toxicity evaluation or sooner if needed.  Total visit time 30 minutes.            Jason Cerna MD  [facility] Hematology and Oncology  [address] [place] [address]  Office : [phone]  Fax : [phone]    Elements of this note have been dictated using speech recognition software. As a result, errors of speech recognition may have occurred.

## 2024-03-26 LAB
Lab: NORMAL
Lab: NORMAL
REFERENCE LAB: NORMAL

## 2024-04-01 ENCOUNTER — OFFICE VISIT (OUTPATIENT)
Dept: UROGYNECOLOGY | Age: 64
End: 2024-04-01
Payer: MEDICARE

## 2024-04-01 DIAGNOSIS — N81.6 POSTERIOR VAGINAL WALL PROLAPSE: Primary | ICD-10-CM

## 2024-04-01 DIAGNOSIS — N81.89 WEAKNESS OF PELVIC FLOOR: ICD-10-CM

## 2024-04-01 DIAGNOSIS — N90.4 LICHEN SCLEROSUS OF FEMALE GENITALIA: ICD-10-CM

## 2024-04-01 PROCEDURE — 99213 OFFICE O/P EST LOW 20 MIN: CPT | Performed by: OBSTETRICS & GYNECOLOGY

## 2024-04-01 PROCEDURE — 99459 PELVIC EXAMINATION: CPT | Performed by: OBSTETRICS & GYNECOLOGY

## 2024-04-01 RX ORDER — NEEDLES, FILTER 19GX1 1/2"
NEEDLE, DISPOSABLE MISCELLANEOUS
COMMUNITY
Start: 2024-02-28

## 2024-04-01 NOTE — PROGRESS NOTES
[facility] UROGYNECOLOGY  [address]  [address]  [place] [id number]  Dept: [phone]        PCP:  Darlin Curry APRN - NP    4/1/2024    No chief complaint on file.      HPI:  Florence Correia is here for her postop check. She had a extraperitoneal vaginal colpopexy using the iliococcygeus fascial suspension, Posterior Repair, Lateral paravaginal defect repair, Perineorrhaphy, Use of Graft (Coloplast [place] Dermis) for repair & Perineal biopsy on 11/13/23 She is doing very well today. She denies fevers, chills nausea, vomiting, chest pain, shortness of breath. She is ambulating, tolerating a regular diet, and pain is well controlled. She does not have any vaginal bleeding and is currently back to doing her normal activities of daily living.  She does not have any difficulty with urination. She is having some difficulty with bowel movements, she feels like she is not able to empty all the way.     She does not have any signs or symptoms of POP recurrence.    No results found for this visit on 04/01/24.    There were no vitals taken for this visit.    Exam: This includes at least 4 minutes of clinical staff time associated with chaperoning a pelvic exam.  Incisions:  Clean, Dry, and Intact. Healing well.   Vulva:    Normal. No lesions  Bartholin's Gland:  Bilateral , Normal, nontender  Skenes Gland:  Bilateral, Normal, nontender   Clitoris:  Normal.   Introitus:    Normal.   Urethral Meatus:  Normal appearing, normal size, no lesions, no prolapse  Urethra:  No masses, no tenderness  Vagina:  No atrophy, no discharge, no lesions  Adnexa:   No masses palpated, no tenderness  Bladder:  No tenderness, no masses palpated  Perineum:  Normal, no lesions    Rectal   Anorectal Exam: No hemorrhoids and no masses or lesions of the perineum           4/1/2024    10:00 AM 8/24/2023     1:00 PM   Pelvic Organ Prolapse Quantification   Anterior Wall (Aa) -1 -3   Anterior Wall (Ba) -1 -3   Cervix or Cuff (C)

## 2024-04-01 NOTE — ASSESSMENT & PLAN NOTE
You are now 4 months postop:  You no longer have lifting, or bathing restrictions. You should be back to your normal activities of daily living.   She should cont with miralax and colace and fiber.     I am releasing you back to your primary care provider. Should your symptoms recur, or new symptoms arise, please do not hesitate to call our office for an appointment.

## 2024-06-06 ENCOUNTER — HOSPITAL ENCOUNTER (OUTPATIENT)
Dept: LAB | Age: 64
Discharge: HOME | End: 2024-06-06
Payer: MEDICARE

## 2024-06-06 DIAGNOSIS — C92.10 CML (CHRONIC MYELOID LEUKEMIA) (HCC): ICD-10-CM

## 2024-06-06 LAB
ALBUMIN SERPL-MCNC: 4.2 G/DL (ref 3.2–4.6)
ALBUMIN/GLOB SERPL: 1.4 (ref 1–1.9)
ALP SERPL-CCNC: 73 U/L (ref 35–104)
ALT SERPL-CCNC: 20 U/L (ref 12–65)
ANION GAP SERPL CALC-SCNC: 9 MMOL/L (ref 9–18)
AST SERPL-CCNC: 29 U/L (ref 15–37)
BASOPHILS # BLD: 0.1 K/UL (ref 0–0.2)
BASOPHILS NFR BLD: 1 % (ref 0–2)
BILIRUB SERPL-MCNC: 0.5 MG/DL (ref 0–1.2)
BUN SERPL-MCNC: 14 MG/DL (ref 8–23)
CALCIUM SERPL-MCNC: 9.5 MG/DL (ref 8.8–10.2)
CHLORIDE SERPL-SCNC: 103 MMOL/L (ref 98–107)
CO2 SERPL-SCNC: 27 MMOL/L (ref 20–28)
CREAT SERPL-MCNC: 0.8 MG/DL (ref 0.6–1.1)
DIFFERENTIAL METHOD BLD: ABNORMAL
EOSINOPHIL # BLD: 0.3 K/UL (ref 0–0.8)
EOSINOPHIL NFR BLD: 3 % (ref 0.5–7.8)
ERYTHROCYTE [DISTWIDTH] IN BLOOD BY AUTOMATED COUNT: 15.9 % (ref 11.9–14.6)
GLOBULIN SER CALC-MCNC: 3.1 G/DL (ref 2.3–3.5)
GLUCOSE SERPL-MCNC: 119 MG/DL (ref 70–99)
HCT VFR BLD AUTO: 31.9 % (ref 35.8–46.3)
HGB BLD-MCNC: 9.7 G/DL (ref 11.7–15.4)
IMM GRANULOCYTES # BLD AUTO: 0 K/UL (ref 0–0.5)
IMM GRANULOCYTES NFR BLD AUTO: 0 % (ref 0–5)
LYMPHOCYTES # BLD: 2.4 K/UL (ref 0.5–4.6)
LYMPHOCYTES NFR BLD: 27 % (ref 13–44)
Lab: NORMAL
Lab: NORMAL
MCH RBC QN AUTO: 22.4 PG (ref 26.1–32.9)
MCHC RBC AUTO-ENTMCNC: 30.4 G/DL (ref 31.4–35)
MCV RBC AUTO: 73.7 FL (ref 82–102)
MONOCYTES # BLD: 0.9 K/UL (ref 0.1–1.3)
MONOCYTES NFR BLD: 11 % (ref 4–12)
NEUTS SEG # BLD: 5.3 K/UL (ref 1.7–8.2)
NEUTS SEG NFR BLD: 58 % (ref 43–78)
NRBC # BLD: 0 K/UL (ref 0–0.2)
PLATELET # BLD AUTO: 317 K/UL (ref 150–450)
PMV BLD AUTO: 10.7 FL (ref 9.4–12.3)
POTASSIUM SERPL-SCNC: 4.3 MMOL/L (ref 3.5–5.1)
PROT SERPL-MCNC: 7.3 G/DL (ref 6.3–8.2)
RBC # BLD AUTO: 4.33 M/UL (ref 4.05–5.2)
REFERENCE LAB: NORMAL
SODIUM SERPL-SCNC: 139 MMOL/L (ref 136–145)
WBC # BLD AUTO: 8.9 K/UL (ref 4.3–11.1)

## 2024-06-06 PROCEDURE — 80053 COMPREHEN METABOLIC PANEL: CPT

## 2024-06-06 PROCEDURE — 85025 COMPLETE CBC W/AUTO DIFF WBC: CPT

## 2024-06-18 NOTE — PROGRESS NOTES
[facility]: Office Visit Established Patient    Reason for follow up:    Florence Correia  is seen in follow-up for CML.       Overview: (copied from prior)  Ms. Correia was seen for the first time in February 2016.  She was referred for evaluation of an elevated white count.  Ms. Dick is a woman of  Generally good health.  She has a variety well compensated medical problems including beta+ thalassemia, degenerative joint disease, hypothyroidism, diverticulitis and chronic pain syndrome.  She has been seen by pain management and has undergone a variety of interventions for varied discomforts.  She stated that she had undergone a routine CBC some time in June 2015.  Her understanding is that this CBC was normal.  On February 2, 2016, she was evaluated for generalized achiness principally in her back, this prompted a CBC and sedimentation rate.  The CBC was notable for a white count of 22,200 with a hematocrit of 34.2 and an MCV of 68.  Her platelet count was 324,000.  The automated differential revealed 78% granulocytes 20% lymphocytes 2% monocytes.  Her sedimentation rate was 28.  Other than for her discomfort, the patient indicated that she felt well.  She denied any poppy swelling.The peripheral flow cytometry was normal as was the LIBBY-2 kinase assay. Her BCR-ABL analysis however was notable for mutated transcripts in 81% of circulating cells. She began treatment with imatinib in April 2016. Her first BCR-ABL quantitation following the initiation of imatinib showed a greater than 3 log reduction in her CML burden.  She shortly thereafter went into a complete molecular remission. She undergoes routine assessments of BCR-ABL. In February 2020, after a prolonged period of a complete molecular remission she was found to have BCR-ABL transcripts at  0.0469%. She had been taking imatinib 5-6 times per week due to diarrhea at that time. She was told to take the medication daily again and to

## 2024-06-19 ENCOUNTER — OFFICE VISIT (OUTPATIENT)
Dept: ONCOLOGY | Age: 64
End: 2024-06-19
Payer: MEDICARE

## 2024-06-19 VITALS
BODY MASS INDEX: 32.01 KG/M2 | TEMPERATURE: 98.1 F | OXYGEN SATURATION: 96 % | HEART RATE: 85 BPM | RESPIRATION RATE: 16 BRPM | HEIGHT: 65 IN | DIASTOLIC BLOOD PRESSURE: 63 MMHG | SYSTOLIC BLOOD PRESSURE: 130 MMHG | WEIGHT: 192.1 LBS

## 2024-06-19 DIAGNOSIS — C92.10 CML (CHRONIC MYELOID LEUKEMIA) (HCC): Primary | ICD-10-CM

## 2024-06-19 PROCEDURE — 99214 OFFICE O/P EST MOD 30 MIN: CPT | Performed by: INTERNAL MEDICINE

## 2024-06-19 RX ORDER — CYANOCOBALAMIN 1000 UG/ML
INJECTION, SOLUTION INTRAMUSCULAR; SUBCUTANEOUS
COMMUNITY
Start: 2024-05-02

## 2024-06-19 ASSESSMENT — PATIENT HEALTH QUESTIONNAIRE - PHQ9
SUM OF ALL RESPONSES TO PHQ9 QUESTIONS 1 & 2: 0
2. FEELING DOWN, DEPRESSED OR HOPELESS: NOT AT ALL
SUM OF ALL RESPONSES TO PHQ QUESTIONS 1-9: 0
1. LITTLE INTEREST OR PLEASURE IN DOING THINGS: NOT AT ALL
SUM OF ALL RESPONSES TO PHQ QUESTIONS 1-9: 0

## 2024-06-19 NOTE — PATIENT INSTRUCTIONS
Patient Information from Today's Visit    The members of your [facility] are listed below:    Physician Provider: Jason Cerna Medical Oncologist  Advanced Practice Clinician: Macey Keen NP  Registered Nurse: Tammy SMALL RN  Navigator: N/A  Medical Assistant: Froy MEEHAN MA  : Viki CARNES   Supportive Care Services: Angy BRUMFIELD LMSW    Diagnosis: CML      Follow Up Instructions:   - Labs reviewed  - Continue Imatinib as prescribed    Follow up in 3 months with Dr. Cerna, with labs 1 week prior      Treatment Summary has been discussed and given to patient:No      Current Labs:   No visits with results within 3 Day(s) from this visit.   Latest known visit with results is:   Hospital Outpatient Visit on 06/06/2024   Component Date Value Ref Range Status    Test Description: 06/06/2024 BCR   Final    ABL    Reference Lab 06/06/2024 THUY   Final    Results: 06/06/2024 DRAWN FOR BSHO   Final    Sodium 06/06/2024 139  136 - 145 mmol/L Final    Potassium 06/06/2024 4.3  3.5 - 5.1 mmol/L Final    Chloride 06/06/2024 103  98 - 107 mmol/L Final    CO2 06/06/2024 27  20 - 28 mmol/L Final    Anion Gap 06/06/2024 9  9 - 18 mmol/L Final    Glucose 06/06/2024 119 (H)  70 - 99 mg/dL Final    Comment: <70 mg/dL Consistent with, but not fully diagnostic of hypoglycemia.  100 - 125 mg/dL Impaired fasting glucose/consistent with pre-diabetes mellitus.  > 126 mg/dl Fasting glucose consistent with overt diabetes mellitus      BUN 06/06/2024 14  8 - 23 MG/DL Final    Creatinine 06/06/2024 0.80  0.60 - 1.10 MG/DL Final    Est, Glom Filt Rate 06/06/2024 83  >60 ml/min/1.73m2 Final    Comment:    Pediatric calculator link: https://www.kidney.org/professionals/kdoqi/gfr_calculatorped     These results are not intended for use in patients <18 years of age.     eGFR results are calculated without a race factor using  the 2021 CKD-EPI equation. Careful clinical correlation is recommended, particularly when comparing to

## 2024-08-24 ENCOUNTER — APPOINTMENT (OUTPATIENT)
Dept: GENERAL RADIOLOGY | Age: 64
End: 2024-08-24
Payer: MEDICARE

## 2024-08-24 ENCOUNTER — HOSPITAL ENCOUNTER (EMERGENCY)
Age: 64
Discharge: HOME | End: 2024-08-24
Attending: EMERGENCY MEDICINE
Payer: MEDICARE

## 2024-08-24 VITALS
OXYGEN SATURATION: 98 % | HEART RATE: 98 BPM | BODY MASS INDEX: 31.92 KG/M2 | HEIGHT: 64 IN | TEMPERATURE: 98.4 F | SYSTOLIC BLOOD PRESSURE: 133 MMHG | RESPIRATION RATE: 16 BRPM | WEIGHT: 187 LBS | DIASTOLIC BLOOD PRESSURE: 89 MMHG

## 2024-08-24 DIAGNOSIS — S52.124A CLOSED NONDISPLACED FRACTURE OF HEAD OF RIGHT RADIUS, INITIAL ENCOUNTER: Primary | ICD-10-CM

## 2024-08-24 PROCEDURE — 73110 X-RAY EXAM OF WRIST: CPT

## 2024-08-24 PROCEDURE — 73080 X-RAY EXAM OF ELBOW: CPT

## 2024-08-24 PROCEDURE — 90471 IMMUNIZATION ADMIN: CPT | Performed by: EMERGENCY MEDICINE

## 2024-08-24 PROCEDURE — 29105 APPLICATION LONG ARM SPLINT: CPT

## 2024-08-24 PROCEDURE — 90714 TD VACC NO PRESV 7 YRS+ IM: CPT | Performed by: EMERGENCY MEDICINE

## 2024-08-24 PROCEDURE — 96375 TX/PRO/DX INJ NEW DRUG ADDON: CPT

## 2024-08-24 PROCEDURE — 96374 THER/PROPH/DIAG INJ IV PUSH: CPT

## 2024-08-24 PROCEDURE — 73562 X-RAY EXAM OF KNEE 3: CPT

## 2024-08-24 PROCEDURE — 99284 EMERGENCY DEPT VISIT MOD MDM: CPT

## 2024-08-24 PROCEDURE — 6360000002 HC RX W HCPCS: Performed by: EMERGENCY MEDICINE

## 2024-08-24 RX ORDER — MORPHINE SULFATE 4 MG/ML
4 INJECTION, SOLUTION INTRAMUSCULAR; INTRAVENOUS
Status: COMPLETED | OUTPATIENT
Start: 2024-08-24 | End: 2024-08-24

## 2024-08-24 RX ORDER — ONDANSETRON 2 MG/ML
4 INJECTION INTRAMUSCULAR; INTRAVENOUS
Status: COMPLETED | OUTPATIENT
Start: 2024-08-24 | End: 2024-08-24

## 2024-08-24 RX ADMIN — MORPHINE SULFATE 4 MG: 4 INJECTION INTRAVENOUS at 18:06

## 2024-08-24 RX ADMIN — ONDANSETRON 4 MG: 2 INJECTION INTRAMUSCULAR; INTRAVENOUS at 18:05

## 2024-08-24 RX ADMIN — CLOSTRIDIUM TETANI TOXOID ANTIGEN (FORMALDEHYDE INACTIVATED) AND CORYNEBACTERIUM DIPHTHERIAE TOXOID ANTIGEN (FORMALDEHYDE INACTIVATED) 0.5 ML: 5; 2 INJECTION, SUSPENSION INTRAMUSCULAR at 16:23

## 2024-08-24 ASSESSMENT — ENCOUNTER SYMPTOMS
COLOR CHANGE: 0
SHORTNESS OF BREATH: 0
ABDOMINAL PAIN: 0
VOMITING: 0
COUGH: 0
NAUSEA: 0

## 2024-08-24 ASSESSMENT — PAIN DESCRIPTION - DESCRIPTORS
DESCRIPTORS: ACHING
DESCRIPTORS: ACHING

## 2024-08-24 ASSESSMENT — PAIN DESCRIPTION - ORIENTATION
ORIENTATION: RIGHT
ORIENTATION: RIGHT

## 2024-08-24 ASSESSMENT — PAIN DESCRIPTION - LOCATION
LOCATION: ARM
LOCATION: ELBOW

## 2024-08-24 ASSESSMENT — PAIN SCALES - GENERAL
PAINLEVEL_OUTOF10: 0
PAINLEVEL_OUTOF10: 7
PAINLEVEL_OUTOF10: 6

## 2024-08-24 ASSESSMENT — PAIN - FUNCTIONAL ASSESSMENT
PAIN_FUNCTIONAL_ASSESSMENT: 0-10
PAIN_FUNCTIONAL_ASSESSMENT: 0-10

## 2024-08-24 ASSESSMENT — LIFESTYLE VARIABLES
HOW MANY STANDARD DRINKS CONTAINING ALCOHOL DO YOU HAVE ON A TYPICAL DAY: 1 OR 2
HOW OFTEN DO YOU HAVE A DRINK CONTAINING ALCOHOL: MONTHLY OR LESS

## 2024-08-24 NOTE — DISCHARGE INSTRUCTIONS
Keep arm in posterior long-arm splint.  Keep affected extremity in sling.  Take pain medication as prescribed.  Schedule close follow-up with [facility].  Contact office Monday to arrange outpatient follow-up

## 2024-08-24 NOTE — ED NOTES
Patient mobility status  with no difficulty. Provider aware     I have reviewed discharge instructions with the patient.  The patient verbalized understanding.    Patient left ED via Discharge Method: ambulatory to Home with Child.    Opportunity for questions and clarification provided.     Patient given 0 scripts.           Hernando Gupta RN  08/24/24 1022

## 2024-08-24 NOTE — ED PROVIDER NOTES
Emergency Department Provider Note       PCP: Marilee Louis APRN - NP   Age: 64 y.o.   Sex: female     DISPOSITION Decision To Discharge 08/24/2024 06:45:19 PM  Condition at Disposition: Stable       ICD-10-CM    1. Closed nondisplaced fracture of head of right radius, initial encounter  S52.124A [facility]          Medical Decision Making     64-year-old female with history of hyperlipidemia, anemia, osteoarthritis, status post right total knee replacement presents with complaint of mechanical trip and fall that occurred just prior to arrival.    Vital signs stable.  X-ray of right elbow with radial head fracture with intra-articular extension.  No dislocation noted.  Case discussed with Ortho.  Patient placed in posterior long-arm splint with sling.  NVID status post splint placement.  Full range of motion of right digits.  Cap refill less than 3 seconds.  Referral placed patient to follow-up with Ortho.  Patient states that she is already prescribed tramadol for pain control.  Patient given return precautions.    ED Course as of 08/25/24 1345   Sat Aug 24, 2024   1309 X-ray R elbow FINDINGS/IMPRESSION: There is a radial head fracture with intra-articular  extension. No dislocation is noted. Anterior and posterior fat pads are present.   [DF]   1741 X-ray R knee IMPRESSION:  1. No acute fracture or dislocation is noted in the right knee.   2. There is a small suprapatellar joint effusion.  3. The right total knee hardware is well seated and in anatomic alignment.   [DF]   3009 X-ray R wrist FINDINGS: There is no evidence of fracture or dislocation. There appears to be  chronic deformity of the distal ulnar shaft. No bony erosions or lesions are  seen. The distal radius and carpal bones are intact.     IMPRESSION:  1. No fracture or dislocation is evident in the right wrist.   [DF]   4870 X-ray L wrist FINDINGS: There is no evidence of acute fracture or dislocation. No  pain, nausea and vomiting.   Genitourinary: Negative.    Musculoskeletal:  Positive for arthralgias and joint swelling. Negative for neck pain and neck stiffness.   Skin:  Positive for wound. Negative for color change and pallor.   Neurological:  Negative for dizziness, weakness, light-headedness, numbness and headaches.        Physical Exam     Vitals signs and nursing note reviewed:  Vitals:    08/24/24 1559 08/24/24 1813 08/24/24 1828 08/24/24 1915   BP: 129/77 122/73 124/72 133/89   Pulse:    98   Resp:    16   Temp:       SpO2: 95% 98% 94% 98%   Weight:       Height:          Physical Exam  Vitals and nursing note reviewed.   Constitutional:       Appearance: Normal appearance.   HENT:      Head: Normocephalic.      Comments: Atraumatic.  No evidence of basilar skull fracture.     Nose: Nose normal.      Mouth/Throat:      Mouth: Mucous membranes are moist.   Eyes:      Extraocular Movements: Extraocular movements intact.      Conjunctiva/sclera: Conjunctivae normal.      Pupils: Pupils are equal, round, and reactive to light.   Neck:      Comments: FROM.  No midline C-spine tenderness.  Cardiovascular:      Rate and Rhythm: Normal rate.      Pulses: Normal pulses.      Heart sounds: Normal heart sounds.   Pulmonary:      Effort: Pulmonary effort is normal.      Breath sounds: Normal breath sounds.   Abdominal:      Palpations: Abdomen is soft.      Tenderness: There is no abdominal tenderness. There is no guarding or rebound.   Musculoskeletal:         General: Tenderness and deformity present. Normal range of motion.      Cervical back: Normal range of motion. No rigidity or tenderness.      Comments: Right upper extremity and splint and sling.  Radial pulse 2+.  Deformity noted to right elbow.  Abrasion noted to right knee.  No deformity noted to knee.  DP/PT pulse 2+.  NVID.  Abrasion noted to left palm.   Skin:     Findings: No erythema or rash.   Neurological:      General: No focal deficit present.       Mental Status: She is alert and oriented to person, place, and time.      Cranial Nerves: No cranial nerve deficit.      Sensory: No sensory deficit.      Motor: No weakness.      Comments: No focal deficits.  Strength 5 out of 5 throughout.  Normal sensory exam.        Procedures     Procedures    Orders Placed This Encounter   Procedures    XR ELBOW RIGHT (MIN 3 VIEWS)    XR WRIST RIGHT (MIN 3 VIEWS)    XR WRIST LEFT (MIN 3 VIEWS)    XR KNEE RIGHT (3 VIEWS)    [facility] ORTH[facility] SUP[facility] Arm Sling, Right; L        Medications given during this emergency department visit:  Medications   tetanus & diphtheria toxoids (adult) 5-2 LFU injection 0.5 mL (0.5 mLs IntraMUSCular Given 8/24/24 1623)   morphine sulfate (PF) injection 4 mg (4 mg IntraVENous Given 8/24/24 1806)   ondansetron (ZOFRAN) injection 4 mg (4 mg IntraVENous Given 8/24/24 1805)       Discharge Medication List as of 8/24/2024  7:08 PM           Past Medical History:   Diagnosis Date    Anemia 02/21/2017    Arthritis     Beta thalassemia ([facility])     Followed by hematology    Beta+ thalassemia (Former[facility])     Chronic pain     neck left arm and shoulder    CML (chronic myeloid leukemia) (Formerly [facility]) 03/08/2016    patient states she is stable at this time    DDD (degenerative disc disease), lumbar     herniated disc L2-3, L5-S1 (leaking)-treatment w/epidural injection    Diverticulosis of colon (without mention of hemorrhage) 2015    Hypothyroid     levothyroxine for hypo    Insomnia     Internal hemorrhoids without mention of complication 2015    Migraine headache     Obesity (BMI 30-39.9) 01/07/2019    BMI 35.9    PUD (peptic ulcer disease) 2002ish    GI bleed s/p use of celebrex; no other issues    S/P total knee replacement using cement 10/05/2011    Spinal stenosis, cervical region         Past Surgical History:   Procedure Laterality Date    ANESTH,SURGERY OF SHOULDER Left 1993    APPENDECTOMY  2007    BREAST BIOPSY

## 2024-08-26 ENCOUNTER — TELEPHONE (OUTPATIENT)
Dept: ORTHOPEDIC SURGERY | Age: 64
End: 2024-08-26

## 2024-08-26 NOTE — TELEPHONE ENCOUNTER
ED Referral  Fall on sidewalk  nondisplaced fracture of head of right radius,   Please review, when should this be seen?  Thank you

## 2024-08-29 ENCOUNTER — OFFICE VISIT (OUTPATIENT)
Age: 64
End: 2024-08-29
Payer: MEDICARE

## 2024-08-29 VITALS — BODY MASS INDEX: 31.92 KG/M2 | WEIGHT: 187 LBS | HEIGHT: 64 IN

## 2024-08-29 DIAGNOSIS — M25.521 RIGHT ELBOW PAIN: ICD-10-CM

## 2024-08-29 DIAGNOSIS — S52.531A CLOSED COLLES' FRACTURE OF RIGHT RADIUS, INITIAL ENCOUNTER: ICD-10-CM

## 2024-08-29 DIAGNOSIS — M25.531 RIGHT WRIST PAIN: Primary | ICD-10-CM

## 2024-08-29 DIAGNOSIS — S52.121A CLOSED DISPLACED FRACTURE OF HEAD OF RIGHT RADIUS, INITIAL ENCOUNTER: ICD-10-CM

## 2024-08-29 PROCEDURE — 99204 OFFICE O/P NEW MOD 45 MIN: CPT | Performed by: ORTHOPAEDIC SURGERY

## 2024-08-29 NOTE — PROGRESS NOTES
Orthopaedic Hand Clinic Note    Name: Florence Correia  YOB: 1960  Gender: female  MRN: [id number]      CC: Patient referred for evaluation of hand pain    HPI: Florence Correia is a 64 y.o. female with a chief complaint of right wrist and elbow pain. The injury occurred 5 days ago when the patient tripped and fell. The pain is located diffusely about the elbow and  wrist. Denies numbness or paresthesias of the fingers. Evaluation has included x-rays. Treatment to date has included splint. Denies loss of consciousness, head injury or neck pain.  She does have a history of prior right distal radius fracture      ROS/Meds/PSH/PMH/FH/SH: I personally reviewed the patients standard intake form.  Pertinents are discussed in the HPI    Physical Examination  Musculoskeletal Exam:  Examination of the right upper extremity demonstrates no open wounds. Sensation is intact throughout, cap refill in all fingers < 5 seconds. Finger motion is limited with moderate swelling of the hand and fingers. Tenderness over right  distal radius. equivocal tenderness over the ulnar aspect of the wrist.  There is tenderness at the radial head.  Elbow range of motion is limited.  She has lack of extension about 40 degrees, flexion to 120 degrees.  She has about 50 degrees of supination 70 degrees of pronation. There is no obvious mechanical block to motion. no tenderness at anatomic snuffbox, hand, digits    Imaging / Electrodiagnostic Tests:     I independently reviewed and interpreted radiographs of the right wrist.  They demonstrate evidence of prior distal radius fracture  I independently reviewed and interpreted right elbow radiographs.  They demonstrate radial head fracture.  There is no true AP or lateral view.    Wrist XR: AP, Lateral, Oblique views     Clinical Indication:  1. Right wrist pain    2. Right elbow pain    3. Closed Colles' fracture of right radius, initial encounter    4. Closed displaced fracture of

## 2024-09-12 ENCOUNTER — HOSPITAL ENCOUNTER (OUTPATIENT)
Dept: LAB | Age: 64
Discharge: HOME | End: 2024-09-15
Payer: MEDICARE

## 2024-09-12 ENCOUNTER — OFFICE VISIT (OUTPATIENT)
Age: 64
End: 2024-09-12
Payer: MEDICARE

## 2024-09-12 DIAGNOSIS — S52.531A CLOSED COLLES' FRACTURE OF RIGHT RADIUS, INITIAL ENCOUNTER: Primary | ICD-10-CM

## 2024-09-12 DIAGNOSIS — S52.121A CLOSED DISPLACED FRACTURE OF HEAD OF RIGHT RADIUS, INITIAL ENCOUNTER: ICD-10-CM

## 2024-09-12 DIAGNOSIS — C92.10 CML (CHRONIC MYELOID LEUKEMIA) (HCC): ICD-10-CM

## 2024-09-12 LAB
ALBUMIN SERPL-MCNC: 4.1 G/DL (ref 3.2–4.6)
ALBUMIN/GLOB SERPL: 1.3 (ref 1–1.9)
ALP SERPL-CCNC: 79 U/L (ref 35–104)
ALT SERPL-CCNC: 18 U/L (ref 12–65)
ANION GAP SERPL CALC-SCNC: 12 MMOL/L (ref 9–18)
AST SERPL-CCNC: 29 U/L (ref 15–37)
BASOPHILS # BLD: 0.1 K/UL (ref 0–0.2)
BASOPHILS NFR BLD: 1 % (ref 0–2)
BILIRUB SERPL-MCNC: 0.5 MG/DL (ref 0–1.2)
BUN SERPL-MCNC: 11 MG/DL (ref 8–23)
CALCIUM SERPL-MCNC: 9.4 MG/DL (ref 8.8–10.2)
CHLORIDE SERPL-SCNC: 104 MMOL/L (ref 98–107)
CO2 SERPL-SCNC: 27 MMOL/L (ref 20–28)
COLLECTION INFORMATION: NORMAL
CREAT SERPL-MCNC: 0.78 MG/DL (ref 0.6–1.1)
DATE SENT TO REF LAB: NORMAL
DIFFERENTIAL METHOD BLD: ABNORMAL
EOSINOPHIL # BLD: 0.3 K/UL (ref 0–0.8)
EOSINOPHIL NFR BLD: 3 % (ref 0.5–7.8)
ERYTHROCYTE [DISTWIDTH] IN BLOOD BY AUTOMATED COUNT: 15.3 % (ref 11.9–14.6)
GLOBULIN SER CALC-MCNC: 3.1 G/DL (ref 2.3–3.5)
GLUCOSE SERPL-MCNC: 120 MG/DL (ref 70–99)
HCT VFR BLD AUTO: 31.1 % (ref 35.8–46.3)
HGB BLD-MCNC: 9.5 G/DL (ref 11.7–15.4)
IMM GRANULOCYTES # BLD AUTO: 0 K/UL (ref 0–0.5)
IMM GRANULOCYTES NFR BLD AUTO: 0 % (ref 0–5)
LYMPHOCYTES # BLD: 1.9 K/UL (ref 0.5–4.6)
LYMPHOCYTES NFR BLD: 25 % (ref 13–44)
Lab: NORMAL
MCH RBC QN AUTO: 22.7 PG (ref 26.1–32.9)
MCHC RBC AUTO-ENTMCNC: 30.5 G/DL (ref 31.4–35)
MCV RBC AUTO: 74.2 FL (ref 82–102)
MONOCYTES # BLD: 0.8 K/UL (ref 0.1–1.3)
MONOCYTES NFR BLD: 10 % (ref 4–12)
NEUTS SEG # BLD: 4.6 K/UL (ref 1.7–8.2)
NEUTS SEG NFR BLD: 61 % (ref 43–78)
NRBC # BLD: 0 K/UL (ref 0–0.2)
PLATELET # BLD AUTO: 320 K/UL (ref 150–450)
PMV BLD AUTO: 10.5 FL (ref 9.4–12.3)
POTASSIUM SERPL-SCNC: 4.3 MMOL/L (ref 3.5–5.1)
PROT SERPL-MCNC: 7.1 G/DL (ref 6.3–8.2)
RBC # BLD AUTO: 4.19 M/UL (ref 4.05–5.2)
SODIUM SERPL-SCNC: 143 MMOL/L (ref 136–145)
WBC # BLD AUTO: 7.7 K/UL (ref 4.3–11.1)

## 2024-09-12 PROCEDURE — 85025 COMPLETE CBC W/AUTO DIFF WBC: CPT

## 2024-09-12 PROCEDURE — 99213 OFFICE O/P EST LOW 20 MIN: CPT | Performed by: ORTHOPAEDIC SURGERY

## 2024-09-12 PROCEDURE — 80053 COMPREHEN METABOLIC PANEL: CPT

## 2024-09-12 PROCEDURE — 36415 COLL VENOUS BLD VENIPUNCTURE: CPT

## 2024-09-19 ENCOUNTER — OFFICE VISIT (OUTPATIENT)
Dept: ONCOLOGY | Age: 64
End: 2024-09-19
Payer: MEDICARE

## 2024-09-19 VITALS
HEART RATE: 78 BPM | DIASTOLIC BLOOD PRESSURE: 78 MMHG | TEMPERATURE: 97.9 F | BODY MASS INDEX: 31.49 KG/M2 | RESPIRATION RATE: 16 BRPM | WEIGHT: 189 LBS | OXYGEN SATURATION: 98 % | SYSTOLIC BLOOD PRESSURE: 144 MMHG | HEIGHT: 65 IN

## 2024-09-19 DIAGNOSIS — C92.10 CML (CHRONIC MYELOID LEUKEMIA) (HCC): Primary | ICD-10-CM

## 2024-09-19 DIAGNOSIS — Z79.899 HIGH RISK MEDICATION USE: ICD-10-CM

## 2024-09-19 PROCEDURE — 99214 OFFICE O/P EST MOD 30 MIN: CPT | Performed by: NURSE PRACTITIONER

## 2024-09-19 ASSESSMENT — PATIENT HEALTH QUESTIONNAIRE - PHQ9
7. TROUBLE CONCENTRATING ON THINGS, SUCH AS READING THE NEWSPAPER OR WATCHING TELEVISION: SEVERAL DAYS
10. IF YOU CHECKED OFF ANY PROBLEMS, HOW DIFFICULT HAVE THESE PROBLEMS MADE IT FOR YOU TO DO YOUR WORK, TAKE CARE OF THINGS AT HOME, OR GET ALONG WITH OTHER PEOPLE: NOT DIFFICULT AT ALL
SUM OF ALL RESPONSES TO PHQ QUESTIONS 1-9: 9
SUM OF ALL RESPONSES TO PHQ QUESTIONS 1-9: 9
SUM OF ALL RESPONSES TO PHQ9 QUESTIONS 1 & 2: 5
8. MOVING OR SPEAKING SO SLOWLY THAT OTHER PEOPLE COULD HAVE NOTICED. OR THE OPPOSITE, BEING SO FIGETY OR RESTLESS THAT YOU HAVE BEEN MOVING AROUND A LOT MORE THAN USUAL: NOT AT ALL
4. FEELING TIRED OR HAVING LITTLE ENERGY: SEVERAL DAYS
SUM OF ALL RESPONSES TO PHQ QUESTIONS 1-9: 9
1. LITTLE INTEREST OR PLEASURE IN DOING THINGS: MORE THAN HALF THE DAYS
5. POOR APPETITE OR OVEREATING: NOT AT ALL
9. THOUGHTS THAT YOU WOULD BE BETTER OFF DEAD, OR OF HURTING YOURSELF: NOT AT ALL
3. TROUBLE FALLING OR STAYING ASLEEP: MORE THAN HALF THE DAYS
2. FEELING DOWN, DEPRESSED OR HOPELESS: NEARLY EVERY DAY
6. FEELING BAD ABOUT YOURSELF - OR THAT YOU ARE A FAILURE OR HAVE LET YOURSELF OR YOUR FAMILY DOWN: NOT AT ALL
SUM OF ALL RESPONSES TO PHQ QUESTIONS 1-9: 9

## 2024-10-01 ENCOUNTER — EVALUATION (OUTPATIENT)
Age: 64
End: 2024-10-01
Payer: MEDICARE

## 2024-10-01 DIAGNOSIS — Z78.9 DECREASED ACTIVITIES OF DAILY LIVING (ADL): ICD-10-CM

## 2024-10-01 DIAGNOSIS — M25.521 RIGHT ELBOW PAIN: Primary | ICD-10-CM

## 2024-10-01 DIAGNOSIS — M25.631 STIFFNESS OF RIGHT WRIST JOINT: ICD-10-CM

## 2024-10-01 DIAGNOSIS — M62.81 MUSCLE WEAKNESS (GENERALIZED): ICD-10-CM

## 2024-10-01 DIAGNOSIS — M25.621 STIFFNESS OF RIGHT ELBOW JOINT: ICD-10-CM

## 2024-10-01 DIAGNOSIS — S52.121A CLOSED DISPLACED FRACTURE OF HEAD OF RIGHT RADIUS, INITIAL ENCOUNTER: ICD-10-CM

## 2024-10-01 PROCEDURE — 97110 THERAPEUTIC EXERCISES: CPT | Performed by: OCCUPATIONAL THERAPIST

## 2024-10-01 PROCEDURE — 97166 OT EVAL MOD COMPLEX 45 MIN: CPT | Performed by: OCCUPATIONAL THERAPIST

## 2024-10-01 NOTE — PROGRESS NOTES
[facility]  [address] [place] [id number]  Dept: [phone]      Occupational Therapy Initial Assessment     Referring MD: Friend, Lavinia PEREIRA MD    Diagnosis:     ICD-10-CM    1. Right elbow pain  M25.521       2. Stiffness of right wrist joint  M25.631       3. Stiffness of right elbow joint  M25.621       4. Muscle weakness (generalized)  M62.81       5. Decreased activities of daily living (ADL)  Z78.9          Surgery/Medical Dx: Date NON OP treatment: right distal radius and radial head fracture.     Therapy precautions: None    History of injury/onset : 8/24/24 fell while attending a local festival.    Payor: Payor: [employer] /  /  /  Billing pattern: Government- total time   Total Direct Treatment Time: 15 min  Total In Office Time: 40 min  Modifier needed: No  Episode visit count:  1     Preferred Name: Rebecca    PERTINENT MEDICAL HISTORY     PMHX & Meds:   Past Medical History:   Diagnosis Date    Anemia 02/21/2017    Arthritis     Beta thalassemia ([facility])     Followed by hematology    Beta+ thalassemia (Former[facility])     Chronic pain     neck left arm and shoulder    CML (chronic myeloid leukemia) (Formerly [facility]) 03/08/2016    patient states she is stable at this time    DDD (degenerative disc disease), lumbar     herniated disc L2-3, L5-S1 (leaking)-treatment w/epidural injection    Diverticulosis of colon (without mention of hemorrhage) 2015    Hypothyroid     levothyroxine for hypo    Insomnia     Internal hemorrhoids without mention of complication 2015    Migraine headache     Obesity (BMI 30-39.9) 01/07/2019    BMI 35.9    PUD (peptic ulcer disease) 2002ish    GI bleed s/p use of celebrex; no other issues    S/P total knee replacement using cement 10/05/2011    Spinal stenosis, cervical region    ,   Past Surgical History:   Procedure Laterality Date    ANESTH,SURGERY OF SHOULDER Left 1993    APPENDECTOMY  2007    BREAST BIOPSY      CERVICAL FUSION  2004, March 2017    C4-5,C5-6

## 2024-10-07 ENCOUNTER — TREATMENT (OUTPATIENT)
Age: 64
End: 2024-10-07
Payer: MEDICARE

## 2024-10-07 DIAGNOSIS — M25.521 RIGHT ELBOW PAIN: Primary | ICD-10-CM

## 2024-10-07 DIAGNOSIS — M62.81 MUSCLE WEAKNESS (GENERALIZED): ICD-10-CM

## 2024-10-07 DIAGNOSIS — M25.631 STIFFNESS OF RIGHT WRIST JOINT: ICD-10-CM

## 2024-10-07 DIAGNOSIS — M25.621 STIFFNESS OF RIGHT ELBOW JOINT: ICD-10-CM

## 2024-10-07 PROCEDURE — 97140 MANUAL THERAPY 1/> REGIONS: CPT | Performed by: OCCUPATIONAL THERAPIST

## 2024-10-07 PROCEDURE — 97010 HOT OR COLD PACKS THERAPY: CPT | Performed by: OCCUPATIONAL THERAPIST

## 2024-10-07 PROCEDURE — 97110 THERAPEUTIC EXERCISES: CPT | Performed by: OCCUPATIONAL THERAPIST

## 2024-10-07 NOTE — PROGRESS NOTES
[facility] ORTHOPAED[facility]  [address] [place] [id number]  Dept: [phone]      Occupational Therapy Daily Note     Referring MD: Friend, Lavinia PEREIRA MD    Diagnosis:     ICD-10-CM    1. Right elbow pain  M25.521       2. Stiffness of right wrist joint  M25.631       3. Stiffness of right elbow joint  M25.621       4. Muscle weakness (generalized)  M62.81            Surgery/Medical Dx: Date NON OP treatment: right distal radius and radial head fracture.     Therapy precautions: None    History of injury/onset : 8/24/24 fell while attending a local festival.    Payor: Payor: [employer] /  /  /  Billing pattern: Government- total time   Total Direct Treatment Time: 40  min  Total In Office Time: 50 min  Modifier needed: No  Episode visit count:  2     Preferred Name: Rebecca    SUBJECTIVE     Current Symptoms/Chief complaints:   Chief Complaint   Patient presents with    Elbow Pain     States compliance with HEP.  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    OBJECTIVE     Functional Outcome Measures: Quick Dash  37 score=   59.09 % functional deficit                       ROM RIGHT  (AROM)   10/1/24 LEFT  (AROM)       Elbow extension/flexion 23/125       Supination/Pronation 86/82      Wrist Extension/Flexion 66/56      RD/UD          RIGHT  (AROM)   10/1/24 LEFT  (AROM)        Thumb MP ext/flex WNL      Thumb IP ext/flex WNL      Radial add/abduction       Palmar add/abduction       Opposition (Kapandji): 10        Treatment:    Hot Pack (39927)  to R elbow prior to treatment for moist heat/tissue extensibility in preparation for treatment. Skin checked prior to application and post treatment with no visible skin issues and no pt complaints.    Therapeutic exercise (65865) x 15 min:  Home Exercise Program development and Education: see below.  Patient I with program following instruction and performance  OT POC and rationale, education for surgical precautions and pain management, edema

## 2024-10-10 ENCOUNTER — OFFICE VISIT (OUTPATIENT)
Age: 64
End: 2024-10-10
Payer: MEDICARE

## 2024-10-10 ENCOUNTER — TREATMENT (OUTPATIENT)
Age: 64
End: 2024-10-10

## 2024-10-10 DIAGNOSIS — M25.621 STIFFNESS OF RIGHT ELBOW JOINT: ICD-10-CM

## 2024-10-10 DIAGNOSIS — M25.631 STIFFNESS OF RIGHT WRIST JOINT: ICD-10-CM

## 2024-10-10 DIAGNOSIS — M25.521 RIGHT ELBOW PAIN: Primary | ICD-10-CM

## 2024-10-10 DIAGNOSIS — S52.121A CLOSED DISPLACED FRACTURE OF HEAD OF RIGHT RADIUS, INITIAL ENCOUNTER: ICD-10-CM

## 2024-10-10 DIAGNOSIS — S52.531A CLOSED COLLES' FRACTURE OF RIGHT RADIUS, INITIAL ENCOUNTER: Primary | ICD-10-CM

## 2024-10-10 PROCEDURE — 97010 HOT OR COLD PACKS THERAPY: CPT | Performed by: OCCUPATIONAL THERAPIST

## 2024-10-10 PROCEDURE — 99213 OFFICE O/P EST LOW 20 MIN: CPT | Performed by: ORTHOPAEDIC SURGERY

## 2024-10-10 NOTE — PROGRESS NOTES
[facility]  [address] [place] [id number]  Dept: [phone]      Occupational Therapy Daily Note     Referring MD: Friend, Lavinia PEREIRA MD    Diagnosis:     ICD-10-CM    1. Right elbow pain  M25.521       2. Stiffness of right wrist joint  M25.631       3. Stiffness of right elbow joint  M25.621              Surgery/Medical Dx: Date NON OP treatment: right distal radius and radial head fracture.     Therapy precautions: None    History of injury/onset : 8/24/24 fell while attending a local festival.    Payor: Payor: [employer] /  /  /  Billing pattern: Government- total time   Total Direct Treatment Time: 40  min  Total In Office Time: 50 min  Modifier needed: No  Episode visit count:  3     Preferred Name: Rebecca    SUBJECTIVE     Current Symptoms/Chief complaints:   Chief Complaint   Patient presents with    Elbow Pain     States she is feeling better over all.  OBJECTIVE     Functional Outcome Measures: Quick Dash  37 score=   59.09 % functional deficit                       ROM RIGHT  (AROM)   10/1/24 LEFT  (AROM)       Elbow extension/flexion 23/125       Supination/Pronation 86/82      Wrist Extension/Flexion 66/56      RD/UD          RIGHT  (AROM)   10/1/24 LEFT  (AROM)        Thumb MP ext/flex WNL      Thumb IP ext/flex WNL      Radial add/abduction       Palmar add/abduction       Opposition (Kapandji): 10        Treatment:    Hot Pack (44556)  to R elbow prior to treatment for moist heat/tissue extensibility in preparation for treatment. Skin checked prior to application and post treatment with no visible skin issues and no pt complaints.    Therapeutic exercise (55286) x 15 min:  OT POC and rationale, education for surgical precautions and pain management, edema management  Passive lengthening    Manual Therapy (81601) x 25  minutes: Addressing soft tissue/joint restrictions and swelling of  R UE:    STM/IASTM upper arm and forearm  Joint mobilizations to R elbow ,

## 2024-10-10 NOTE — PROGRESS NOTES
radius, initial encounter  S52.531A XR WRIST RIGHT (MIN 3 VIEWS)      2. Closed displaced fracture of head of right radius, initial encounter  S52.121A XR ELBOW RIGHT (MIN 3 VIEWS)          Plan:   We discussed the diagnosis and different treatment options. We discussed observation, therapy, antiinflammatory medications and other pertinent treatment modalities.    After discussing in detail the patient elects to proceed with continued nonsurgical treatment.  She can discontinue use of her wrist brace except as needed for comfort.  She can continue range of motion and progress strengthening as tolerated.  She will follow-up as needed    Patient voiced accordance and understanding of the information provided and the formulated plan. All questions were answered to the patient's satisfaction during the encounter.      Lavinia Gonzalez MD  Orthopaedic Surgery  10/10/24  4:13 PM

## 2024-10-18 ENCOUNTER — TREATMENT (OUTPATIENT)
Age: 64
End: 2024-10-18

## 2024-10-18 DIAGNOSIS — M25.631 STIFFNESS OF RIGHT WRIST JOINT: ICD-10-CM

## 2024-10-18 DIAGNOSIS — M25.621 STIFFNESS OF RIGHT ELBOW JOINT: ICD-10-CM

## 2024-10-18 DIAGNOSIS — M25.521 RIGHT ELBOW PAIN: Primary | ICD-10-CM

## 2024-10-18 NOTE — PROGRESS NOTES
[facility]  [address] [place] [id number]  Dept: [phone]      Occupational Therapy Daily Note     Referring MD: Friend, Lavinia PEREIRA MD    Diagnosis:     ICD-10-CM    1. Right elbow pain  M25.521       2. Stiffness of right wrist joint  M25.631       3. Stiffness of right elbow joint  M25.621              Surgery/Medical Dx: Date NON OP treatment: right distal radius and radial head fracture.     Therapy precautions: None    History of injury/onset : 8/24/24 fell while attending a local festival.    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  OBJECTIVE     Functional Outcome Measures: Quick Dash  37 score=   59.09 % functional deficit                       ROM RIGHT  (AROM)   10/1/24 RIGHT 10/18/24       Elbow extension/flexion 23/125 18/130      Supination/Pronation 86/82      Wrist Extension/Flexion 66/56      RD/UD           Treatment:    Hot Pack (05241)  to R elbow prior to treatment for moist heat/tissue extensibility in preparation for treatment. Skin checked prior to application and post treatment with no visible skin issues and no pt complaints.    Therapeutic exercise (72906) x 30 min:  Passive lengthening  AAROM on bolster for elbow extension/flexion  Pro/sup wheel for AAROM rotation  Wristmaze for wrist ROM all planes  Clothespins (all)    Manual Therapy (44403) x 25  minutes: Addressing soft tissue/joint restrictions and swelling of  R UE:    STM/IASTM upper arm and forearm  Joint mobilizations to R elbow , Grade II,III elbow mobilizations with distraction/oscillations in anterior and posterior direction

## 2024-10-25 ENCOUNTER — TREATMENT (OUTPATIENT)
Age: 64
End: 2024-10-25

## 2024-10-25 DIAGNOSIS — M25.521 RIGHT ELBOW PAIN: Primary | ICD-10-CM

## 2024-10-25 DIAGNOSIS — M62.81 MUSCLE WEAKNESS (GENERALIZED): ICD-10-CM

## 2024-10-25 DIAGNOSIS — M25.631 STIFFNESS OF RIGHT WRIST JOINT: ICD-10-CM

## 2024-10-25 DIAGNOSIS — M25.621 STIFFNESS OF RIGHT ELBOW JOINT: ICD-10-CM

## 2024-10-25 NOTE — PROGRESS NOTES
[facility] ORTHOPAED[facility]  [address] [place] [id number]  Dept: [phone]      Occupational Therapy Daily Note     Referring MD: Friend, Lavinia PEREIRA MD    Diagnosis:     ICD-10-CM    1. Right elbow pain  M25.521       2. Stiffness of right wrist joint  M25.631       3. Stiffness of right elbow joint  M25.621       4. Muscle weakness (generalized)  M62.81          Surgery/Medical Dx: Date NON OP treatment: right distal radius and radial head fracture.     Therapy precautions: None    History of injury/onset : 8/24/24 fell while attending a local festival.    Payor: Payor: [employer]ICARE /  /  /  Billing pattern: Government- total time   Total Direct Treatment Time: 40  min  Total In Office Time: 50 min  Modifier needed: No  Episode visit count:  5     Preferred Name: Rebecca    SUBJECTIVE     Current Symptoms/Chief complaints:   Chief Complaint   Patient presents with    Wrist Pain    Elbow Pain     States she has completed her [employer] project and feels that she won't be as sore.      OBJECTIVE     Functional Outcome Measures: Quick Dash  37 score=   59.09 % functional deficit                       ROM RIGHT  (AROM)   10/1/24 RIGHT 10/18/24       Elbow extension/flexion 23/125 18/130      Supination/Pronation 86/82      Wrist Extension/Flexion 66/56      RD/UD         Strength  Strength (psi): RIGHT  10/25/24 LEFT        Position II 39.6 54.2     Lat Pinch: 14 13     2pt pinch: 11 11     3pt pinch: 12 14              Treatment:    Hot Pack (92602)  to R elbow prior to treatment for moist heat/tissue extensibility in preparation for treatment. Skin checked prior to application and post treatment with no visible skin issues and no pt complaints.    Therapeutic exercise (12533) x 15 min:  Passive lengthening  Elbow extension at wall  Posterior upper arm towel roll and active extension elbow while standing    Manual Therapy (95287) x 25  minutes: Addressing soft tissue/joint restrictions and

## 2024-11-01 ENCOUNTER — TREATMENT (OUTPATIENT)
Age: 64
End: 2024-11-01

## 2024-11-01 DIAGNOSIS — Z78.9 DECREASED ACTIVITIES OF DAILY LIVING (ADL): ICD-10-CM

## 2024-11-01 DIAGNOSIS — M25.521 RIGHT ELBOW PAIN: Primary | ICD-10-CM

## 2024-11-01 DIAGNOSIS — M25.631 STIFFNESS OF RIGHT WRIST JOINT: ICD-10-CM

## 2024-11-01 DIAGNOSIS — M62.81 MUSCLE WEAKNESS (GENERALIZED): ICD-10-CM

## 2024-11-01 DIAGNOSIS — M25.621 STIFFNESS OF RIGHT ELBOW JOINT: ICD-10-CM

## 2024-11-01 NOTE — PROGRESS NOTES
[facility] ORTHOPAED[facility]  [address] [place] [id number]  Dept: [phone]        Occupational Therapy Daily Note     Referring MD: Friend, Lavinia PEREIRA MD    Diagnosis:     ICD-10-CM    1. Right elbow pain  M25.521       2. Stiffness of right wrist joint  M25.631       3. Stiffness of right elbow joint  M25.621       4. Muscle weakness (generalized)  M62.81       5. Decreased activities of daily living (ADL)  Z78.9          Surgery/Medical Dx: Date NON OP treatment: right distal radius and radial head fracture.     Therapy precautions: None    History of injury/onset : 8/24/24 fell while attending a local festival.    Payor: Payor: [employer]ICARE /  /  /  Billing pattern: Government- total time   Total Direct Treatment Time: 45  min  Total In Office Time: 60 min  Modifier needed: No  Episode visit count:  6     Preferred Name: Rebecca    SUBJECTIVE     Current Symptoms/Chief complaints:   Chief Complaint   Patient presents with    Elbow Pain     Reports she is doing well, still having posterior elbow pain at end ROM    OBJECTIVE     Functional Outcome Measures: Quick Dash  37 score=   59.09 % functional deficit                       ROM RIGHT  (AROM)   10/1/24 RIGHT 10/18/24       Elbow extension/flexion 23/125 18/130      Supination/Pronation 86/82      Wrist Extension/Flexion 66/56      RD/UD         Strength  Strength (psi): RIGHT  10/25/24 LEFT        Position II 39.6 54.2     Lat Pinch: 14 13     2pt pinch: 11 11     3pt pinch: 12 14        Treatment:      Therapeutic exercise (07975) x 30 min:  Passive lengthening  AAROM elbow E/F on door with end range stretching  Jar opening simulation tools  Green putty  Clothespins (all)    Manual Therapy (00149) x 15  minutes: Addressing soft tissue/joint restrictions and swelling of  R UE:    STM/IASTM upper arm and forearm  Joint mobilizations to R elbow , Grade II,III elbow mobilizations with distraction/oscillations in anterior and posterior

## 2024-11-15 ENCOUNTER — TREATMENT (OUTPATIENT)
Age: 64
End: 2024-11-15

## 2024-11-15 DIAGNOSIS — M62.81 MUSCLE WEAKNESS (GENERALIZED): ICD-10-CM

## 2024-11-15 DIAGNOSIS — Z78.9 DECREASED ACTIVITIES OF DAILY LIVING (ADL): ICD-10-CM

## 2024-11-15 DIAGNOSIS — M25.521 RIGHT ELBOW PAIN: Primary | ICD-10-CM

## 2024-11-15 DIAGNOSIS — M25.621 STIFFNESS OF RIGHT ELBOW JOINT: ICD-10-CM

## 2024-11-15 NOTE — PROGRESS NOTES
range stretching  Jar opening simulation tools  Green putty   and push declining dowels in the pink putty    Manual Therapy (01039) x 10  minutes: Addressing soft tissue/joint restrictions and swelling of  R UE:    Joint mobilizations to R elbow , Grade II,III elbow mobilizations with distraction/oscillations in anterior and posterior direction prior to ROM for joint lubrication, pain management, ligament and tissue extensibility  Gentle therapist overpressure extension and flexion  MWM with belt elbow at 90 and distraction R/U heads in pain free manner      Access Code: [email or web address]  URL: [email or web address]  Date: 10/01/2024  Prepared by: Precious Leo    Exercises  - Standing Elbow Extension with Towel Roll at Wall - Supination  - 5 x daily - 7 x weekly - 2-3 sets - 15 reps - 3 sec hold  - Supine Elbow Flexion Extension AROM  - 5 x daily - 7 x weekly - 1 sets - 10 reps - 15 sec hold      ASSESSMENT     Patient progressing very well.  Anticipate some end range limitations in elbow extension based on end feel.  Still having some hand weakness, but is I with ADL's.  Has met goals, but still having some pain and stiffness in the elbow- follow up for final measurements 12/6/24    PLAN OF CARE     Effective Dates/Duration: 10/1/2024 TO 12/1/2024 (60 days)   Frequency 2x/week     Likely DC at next visit.      GOALS       Short term goals:  10/30/2024  (4 weeks)  MET 11/14/24  Patient will be I with HEP and use of orthosis.   MET   Increase AROM affected elbow flexion to  130 ° to improve function  MET 11/14/24  Increase AROM affected elbow extension to 15 ° to improve function  MET   Pts Quick DASH functional assessment score will be less than 45 % functional deficit  MET      Long term goals: 12/1/2024 (60 days)   Pt will have  full °  of AROM affected forearm and elbow to improve ability  with tasks like opening doors, hold a plate.  MET 11/14/24  Pt will have at least 30 lbs of  strength in

## 2024-12-11 ENCOUNTER — HOSPITAL ENCOUNTER (OUTPATIENT)
Dept: LAB | Age: 64
Discharge: HOME | End: 2024-12-11
Payer: MEDICARE

## 2024-12-11 ENCOUNTER — TREATMENT (OUTPATIENT)
Age: 64
End: 2024-12-11
Payer: MEDICARE

## 2024-12-11 DIAGNOSIS — Z78.9 DECREASED ACTIVITIES OF DAILY LIVING (ADL): ICD-10-CM

## 2024-12-11 DIAGNOSIS — M25.521 RIGHT ELBOW PAIN: Primary | ICD-10-CM

## 2024-12-11 DIAGNOSIS — M25.621 STIFFNESS OF RIGHT ELBOW JOINT: ICD-10-CM

## 2024-12-11 DIAGNOSIS — C92.10 CML (CHRONIC MYELOID LEUKEMIA) (HCC): ICD-10-CM

## 2024-12-11 DIAGNOSIS — M62.81 MUSCLE WEAKNESS (GENERALIZED): ICD-10-CM

## 2024-12-11 LAB
ALBUMIN SERPL-MCNC: 3.8 G/DL (ref 3.2–4.6)
ALBUMIN/GLOB SERPL: 1.1 (ref 1–1.9)
ALP SERPL-CCNC: 88 U/L (ref 35–104)
ALT SERPL-CCNC: 15 U/L (ref 8–45)
ANION GAP SERPL CALC-SCNC: 9 MMOL/L (ref 7–16)
AST SERPL-CCNC: 25 U/L (ref 15–37)
BASOPHILS # BLD: 0.1 K/UL (ref 0–0.2)
BASOPHILS NFR BLD: 1 % (ref 0–2)
BILIRUB SERPL-MCNC: 0.3 MG/DL (ref 0–1.2)
BUN SERPL-MCNC: 12 MG/DL (ref 8–23)
CALCIUM SERPL-MCNC: 9.3 MG/DL (ref 8.8–10.2)
CHLORIDE SERPL-SCNC: 105 MMOL/L (ref 98–107)
CO2 SERPL-SCNC: 28 MMOL/L (ref 20–29)
COLLECTION INFORMATION: NORMAL
CREAT SERPL-MCNC: 0.87 MG/DL (ref 0.6–1.1)
DATE SENT TO REF LAB: NORMAL
DIFFERENTIAL METHOD BLD: ABNORMAL
EOSINOPHIL # BLD: 0.3 K/UL (ref 0–0.8)
EOSINOPHIL NFR BLD: 3 % (ref 0.5–7.8)
ERYTHROCYTE [DISTWIDTH] IN BLOOD BY AUTOMATED COUNT: 15 % (ref 11.9–14.6)
GLOBULIN SER CALC-MCNC: 3.4 G/DL (ref 2.3–3.5)
GLUCOSE SERPL-MCNC: 119 MG/DL (ref 70–99)
HCT VFR BLD AUTO: 31 % (ref 35.8–46.3)
HGB BLD-MCNC: 9.5 G/DL (ref 11.7–15.4)
IMM GRANULOCYTES # BLD AUTO: 0 K/UL (ref 0–0.5)
IMM GRANULOCYTES NFR BLD AUTO: 0 % (ref 0–5)
LYMPHOCYTES # BLD: 1.9 K/UL (ref 0.5–4.6)
LYMPHOCYTES NFR BLD: 24 % (ref 13–44)
Lab: NORMAL
MCH RBC QN AUTO: 22.5 PG (ref 26.1–32.9)
MCHC RBC AUTO-ENTMCNC: 30.6 G/DL (ref 31.4–35)
MCV RBC AUTO: 73.3 FL (ref 82–102)
MONOCYTES # BLD: 0.6 K/UL (ref 0.1–1.3)
MONOCYTES NFR BLD: 8 % (ref 4–12)
NEUTS SEG # BLD: 5.1 K/UL (ref 1.7–8.2)
NEUTS SEG NFR BLD: 64 % (ref 43–78)
NRBC # BLD: 0 K/UL (ref 0–0.2)
PLATELET # BLD AUTO: 316 K/UL (ref 150–450)
PMV BLD AUTO: 10.5 FL (ref 9.4–12.3)
POTASSIUM SERPL-SCNC: 3.8 MMOL/L (ref 3.5–5.1)
PROT SERPL-MCNC: 7.1 G/DL (ref 6.3–8.2)
RBC # BLD AUTO: 4.23 M/UL (ref 4.05–5.2)
SODIUM SERPL-SCNC: 142 MMOL/L (ref 136–145)
WBC # BLD AUTO: 8 K/UL (ref 4.3–11.1)

## 2024-12-11 PROCEDURE — 97110 THERAPEUTIC EXERCISES: CPT | Performed by: OCCUPATIONAL THERAPIST

## 2024-12-11 PROCEDURE — 80053 COMPREHEN METABOLIC PANEL: CPT

## 2024-12-11 PROCEDURE — 85025 COMPLETE CBC W/AUTO DIFF WBC: CPT

## 2024-12-11 PROCEDURE — 36415 COLL VENOUS BLD VENIPUNCTURE: CPT

## 2024-12-11 NOTE — PROGRESS NOTES
[facility]  [address] [place] [id number]  Dept: [phone]        Occupational Therapy Discharge     Referring MD: Lavinia Gonzalez MD    Diagnosis:     ICD-10-CM    1. Right elbow pain  M25.521       2. Stiffness of right elbow joint  M25.621       3. Muscle weakness (generalized)  M62.81       4. Decreased activities of daily living (ADL)  Z78.9              Surgery/Medical Dx: Date NON OP treatment: right distal radius and radial head fracture.     Therapy precautions: None    History of injury/onset : 8/24/24 fell while attending a local festival.    Payor: Payor: [employer] /  /  /  Billing pattern: Government- total time   Total Direct Treatment Time: 15  min  Total In Office Time: 40 min  Modifier needed: No  Episode visit count:  8     Preferred Name: Rebecca    SUBJECTIVE     Current Symptoms/Chief complaints:   No chief complaint on file.    States she was pain free and using the arm very well, however strained the elbow with lifting a big on Monday and is having slight increased stiffness and soreness this date (2 days later).  Feels appropriate for discharge per report.  OBJECTIVE     Functional Outcome Measures: Quick Dash  37 score=   59.09 % functional deficit at IE             14 score=   6.81 % functional deficit 11/15/24                  score=     9.09 % functional deficit 12/11/24                     ROM RIGHT  (AROM)   10/1/24 RIGHT 10/18/24   RIGHT 11/15/24 RIGHT 12/11/24   Elbow extension/flexion 23/125 18/130 19/133 start  12/136 end 15/130 upon arrival    Supination/Pronation 86/82  89/89    Wrist Extension/Flexion 66/56      RD/UD         Strength (psi): RIGHT  10/25/24 LEFT   RIGHT 11/15/24 RIGHT 12/11/24    Position II 39.6 54.2 44.5 48.2   Lat Pinch: 14 13 14 16   2pt pinch: 11 11 11 11   3pt pinch: 12 14 13 14      Treatment:    Therapeutic exercise (32182) x 15 min:  Home Exercise Program development and Education: see below.  Patient I

## 2024-12-17 DIAGNOSIS — C92.10 CML (CHRONIC MYELOID LEUKEMIA) (HCC): ICD-10-CM

## 2024-12-19 ENCOUNTER — OFFICE VISIT (OUTPATIENT)
Dept: ONCOLOGY | Age: 64
End: 2024-12-19

## 2024-12-19 VITALS
HEIGHT: 65 IN | OXYGEN SATURATION: 100 % | WEIGHT: 190.2 LBS | RESPIRATION RATE: 16 BRPM | HEART RATE: 80 BPM | BODY MASS INDEX: 31.69 KG/M2 | DIASTOLIC BLOOD PRESSURE: 76 MMHG | TEMPERATURE: 98.1 F | SYSTOLIC BLOOD PRESSURE: 143 MMHG

## 2024-12-19 DIAGNOSIS — C92.10 CML (CHRONIC MYELOID LEUKEMIA) (HCC): ICD-10-CM

## 2024-12-19 DIAGNOSIS — Z79.899 HIGH RISK MEDICATION USE: Primary | ICD-10-CM

## 2024-12-19 RX ORDER — IMATINIB MESYLATE 400 MG/1
400 TABLET, FILM COATED ORAL DAILY
Qty: 30 TABLET | Refills: 5 | Status: ACTIVE | OUTPATIENT
Start: 2024-12-19

## 2024-12-19 RX ORDER — IMATINIB MESYLATE 400 MG/1
TABLET, FILM COATED ORAL
Qty: 30 TABLET | Refills: 5 | OUTPATIENT
Start: 2024-12-19

## 2024-12-19 ASSESSMENT — PATIENT HEALTH QUESTIONNAIRE - PHQ9
SUM OF ALL RESPONSES TO PHQ QUESTIONS 1-9: 2
SUM OF ALL RESPONSES TO PHQ QUESTIONS 1-9: 2
1. LITTLE INTEREST OR PLEASURE IN DOING THINGS: SEVERAL DAYS
2. FEELING DOWN, DEPRESSED OR HOPELESS: SEVERAL DAYS
SUM OF ALL RESPONSES TO PHQ9 QUESTIONS 1 & 2: 2
SUM OF ALL RESPONSES TO PHQ QUESTIONS 1-9: 2
SUM OF ALL RESPONSES TO PHQ QUESTIONS 1-9: 2

## 2024-12-19 NOTE — PROGRESS NOTES
indicating effective control of the disease. She is tolerating imatinib well. BCR-ABL1 transcript was undetectable in September 2024. Repeat testing results from 12/13/2024 are pending. She will continue her current regimen of Gleevec 400 mg daily. Additional refills have been provided. The results of the BCR-ABL1 test from 12/13/2024 will be communicated upon receipt.    Follow-up  The patient will follow up in 3 months with labs or sooner if needed. Total visit time 30 minutes.     PROCEDURE  The patient has undergone 3 level fusions in her upper back and one in her lower back.        Jason Cerna MD  [facility]  [address] [place] [address]  Office : [phone]  Fax : [phone]      Elements of this note have been dictated using speech recognition software. As a result, errors of speech recognition may have occurred.  The patient (or guardian, if applicable) and other individuals in attendance with the patient were advised that Artificial Intelligence will be utilized during this visit to record, process the conversation to generate a clinical note, and support improvement of the AI technology. The patient (or guardian, if applicable) and other individuals in attendance at the appointment consented to the use of AI, including the recording.

## 2024-12-19 NOTE — PATIENT INSTRUCTIONS
Patient Information from Today's Visit    The members of your Oncology Medical Home are listed below:    Physician Provider: Jason Cerna, Medical Oncologist  Advanced Practice Clinician: Macey Keen NP  Registered Nurse: Tammy SMALL RN  Navigator: N/A  Medical Assistant: Froy MEEHAN MA  : Sophia SERRANO   Supportive Care Services: Angy BRUMFIELD LMSW    Diagnosis: CML      Follow Up Instructions:   - Labs reviewed  - BCR-ABL still pending  - Continue imatinib as you are tolerating it    Follow up in 3 months with labs prior     Treatment Summary has been discussed and given to patient:No      Current Labs:   No visits with results within 3 Day(s) from this visit.   Latest known visit with results is:   Hospital Outpatient Visit on 12/11/2024   Component Date Value Ref Range Status    WBC 12/11/2024 8.0  4.3 - 11.1 K/uL Final    RBC 12/11/2024 4.23  4.05 - 5.2 M/uL Final    Hemoglobin 12/11/2024 9.5 (L)  11.7 - 15.4 g/dL Final    Hematocrit 12/11/2024 31.0 (L)  35.8 - 46.3 % Final    MCV 12/11/2024 73.3 (L)  82.0 - 102.0 FL Final    MCH 12/11/2024 22.5 (L)  26.1 - 32.9 PG Final    MCHC 12/11/2024 30.6 (L)  31.4 - 35.0 g/dL Final    RDW 12/11/2024 15.0 (H)  11.9 - 14.6 % Final    Platelets 12/11/2024 316  150 - 450 K/uL Final    MPV 12/11/2024 10.5  9.4 - 12.3 FL Final    nRBC 12/11/2024 0.00  0.0 - 0.2 K/uL Final    **Note: Absolute NRBC parameter is now reported with Hemogram**    Neutrophils % 12/11/2024 64  43 - 78 % Final    Lymphocytes % 12/11/2024 24  13 - 44 % Final    Monocytes % 12/11/2024 8  4.0 - 12.0 % Final    Eosinophils % 12/11/2024 3  0.5 - 7.8 % Final    Basophils % 12/11/2024 1  0.0 - 2.0 % Final    Immature Granulocytes % 12/11/2024 0  0.0 - 5.0 % Final    Neutrophils Absolute 12/11/2024 5.1  1.7 - 8.2 K/UL Final    Lymphocytes Absolute 12/11/2024 1.9  0.5 - 4.6 K/UL Final    Monocytes Absolute 12/11/2024 0.6  0.1 - 1.3 K/UL Final    Eosinophils Absolute 12/11/2024 0.3  0.0 - 0.8 K/UL

## 2025-02-06 ENCOUNTER — TRANSCRIBE ORDERS (OUTPATIENT)
Dept: SCHEDULING | Age: 65
End: 2025-02-06

## 2025-02-06 DIAGNOSIS — Z12.31 OTHER SCREENING MAMMOGRAM: Primary | ICD-10-CM

## 2025-03-04 ENCOUNTER — TELEPHONE (OUTPATIENT)
Dept: ONCOLOGY | Age: 65
End: 2025-03-04

## 2025-03-04 NOTE — TELEPHONE ENCOUNTER
Physician provider: Dr. Cerna  Reason for today's call (Please detail here patients chief complaint): Pt opted to do walk-in labs at [place] lab location on 3/5. Please confirm lab orders are sent to that location.  Last office visit:n/a  Patient Callback Number: [phone]   Was callback number verified?: Yes  Preferred pharmacy (If refill request): n/a  Calls to office within the last 48 hours?:No    Patient notified that their information will be routed to the [facility] clinical triage team for review. Patient is advised that they will receive a phone call from the triage department. If symptom related and symptoms worsen before receiving a call back, the patient has been advised to proceed to the nearest ED.

## 2025-03-05 DIAGNOSIS — C92.10 CML (CHRONIC MYELOID LEUKEMIA) (HCC): ICD-10-CM

## 2025-03-05 LAB
ALBUMIN SERPL-MCNC: 3.7 G/DL (ref 3.2–4.6)
ALBUMIN/GLOB SERPL: 1.1 (ref 1–1.9)
ALP SERPL-CCNC: 75 U/L (ref 35–104)
ALT SERPL-CCNC: 16 U/L (ref 8–45)
ANION GAP SERPL CALC-SCNC: 10 MMOL/L (ref 7–16)
AST SERPL-CCNC: 30 U/L (ref 15–37)
BASOPHILS # BLD: 0.1 K/UL (ref 0–0.2)
BASOPHILS NFR BLD: 1.4 % (ref 0–2)
BILIRUB SERPL-MCNC: 0.4 MG/DL (ref 0–1.2)
BUN SERPL-MCNC: 10 MG/DL (ref 8–23)
CALCIUM SERPL-MCNC: 9.2 MG/DL (ref 8.8–10.2)
CHLORIDE SERPL-SCNC: 104 MMOL/L (ref 98–107)
CO2 SERPL-SCNC: 26 MMOL/L (ref 20–29)
CREAT SERPL-MCNC: 0.88 MG/DL (ref 0.6–1.1)
DIFFERENTIAL METHOD BLD: ABNORMAL
EOSINOPHIL # BLD: 0.21 K/UL (ref 0–0.8)
EOSINOPHIL NFR BLD: 3 % (ref 0.5–7.8)
ERYTHROCYTE [DISTWIDTH] IN BLOOD BY AUTOMATED COUNT: 15.1 % (ref 11.9–14.6)
GLOBULIN SER CALC-MCNC: 3.4 G/DL (ref 2.3–3.5)
GLUCOSE SERPL-MCNC: 88 MG/DL (ref 70–99)
HCT VFR BLD AUTO: 31.1 % (ref 35.8–46.3)
HGB BLD-MCNC: 9.4 G/DL (ref 11.7–15.4)
IMM GRANULOCYTES # BLD AUTO: 0.02 K/UL (ref 0–0.5)
IMM GRANULOCYTES NFR BLD AUTO: 0.3 % (ref 0–5)
LYMPHOCYTES # BLD: 1.93 K/UL (ref 0.5–4.6)
LYMPHOCYTES NFR BLD: 27.9 % (ref 13–44)
MCH RBC QN AUTO: 21.5 PG (ref 26.1–32.9)
MCHC RBC AUTO-ENTMCNC: 30.2 G/DL (ref 31.4–35)
MCV RBC AUTO: 71 FL (ref 82–102)
MONOCYTES # BLD: 0.7 K/UL (ref 0.1–1.3)
MONOCYTES NFR BLD: 10.1 % (ref 4–12)
NEUTS SEG # BLD: 3.95 K/UL (ref 1.7–8.2)
NEUTS SEG NFR BLD: 57.3 % (ref 43–78)
NRBC # BLD: 0 K/UL (ref 0–0.2)
PLATELET # BLD AUTO: 293 K/UL (ref 150–450)
PMV BLD AUTO: 11.1 FL (ref 9.4–12.3)
POTASSIUM SERPL-SCNC: 3.9 MMOL/L (ref 3.5–5.1)
PROT SERPL-MCNC: 7.1 G/DL (ref 6.3–8.2)
RBC # BLD AUTO: 4.38 M/UL (ref 4.05–5.2)
SODIUM SERPL-SCNC: 140 MMOL/L (ref 136–145)
WBC # BLD AUTO: 6.9 K/UL (ref 4.3–11.1)

## 2025-03-19 DIAGNOSIS — Z13.820 OSTEOPOROSIS SCREENING: ICD-10-CM

## 2025-03-19 DIAGNOSIS — Z13.820 SCREENING FOR OSTEOPOROSIS: ICD-10-CM

## 2025-03-19 DIAGNOSIS — E55.9 VITAMIN D DEFICIENCY: ICD-10-CM

## 2025-03-20 ENCOUNTER — OFFICE VISIT (OUTPATIENT)
Dept: ONCOLOGY | Age: 65
End: 2025-03-20
Payer: MEDICARE

## 2025-03-20 VITALS
HEIGHT: 65 IN | OXYGEN SATURATION: 98 % | WEIGHT: 188.2 LBS | SYSTOLIC BLOOD PRESSURE: 131 MMHG | TEMPERATURE: 97.9 F | DIASTOLIC BLOOD PRESSURE: 83 MMHG | RESPIRATION RATE: 17 BRPM | BODY MASS INDEX: 31.36 KG/M2 | HEART RATE: 99 BPM

## 2025-03-20 DIAGNOSIS — C92.10 CML (CHRONIC MYELOID LEUKEMIA) (HCC): Primary | ICD-10-CM

## 2025-03-20 DIAGNOSIS — Z79.899 HIGH RISK MEDICATION USE: ICD-10-CM

## 2025-03-20 PROCEDURE — 99214 OFFICE O/P EST MOD 30 MIN: CPT | Performed by: NURSE PRACTITIONER

## 2025-03-20 ASSESSMENT — PATIENT HEALTH QUESTIONNAIRE - PHQ9
SUM OF ALL RESPONSES TO PHQ QUESTIONS 1-9: 0
1. LITTLE INTEREST OR PLEASURE IN DOING THINGS: NOT AT ALL
2. FEELING DOWN, DEPRESSED OR HOPELESS: NOT AT ALL
SUM OF ALL RESPONSES TO PHQ QUESTIONS 1-9: 0

## 2025-03-24 SDOH — HEALTH STABILITY: PHYSICAL HEALTH: ON AVERAGE, HOW MANY DAYS PER WEEK DO YOU ENGAGE IN MODERATE TO STRENUOUS EXERCISE (LIKE A BRISK WALK)?: 0 DAYS

## 2025-03-26 ENCOUNTER — OFFICE VISIT (OUTPATIENT)
Dept: INTERNAL MEDICINE CLINIC | Facility: CLINIC | Age: 65
End: 2025-03-26
Payer: MEDICARE

## 2025-03-26 VITALS
DIASTOLIC BLOOD PRESSURE: 90 MMHG | BODY MASS INDEX: 32.33 KG/M2 | OXYGEN SATURATION: 97 % | HEIGHT: 64 IN | WEIGHT: 189.4 LBS | SYSTOLIC BLOOD PRESSURE: 144 MMHG | HEART RATE: 87 BPM

## 2025-03-26 DIAGNOSIS — R11.15 CYCLICAL VOMITING SYNDROME UNRELATED TO MIGRAINE: ICD-10-CM

## 2025-03-26 DIAGNOSIS — R41.89 COGNITIVE AND BEHAVIORAL CHANGES: ICD-10-CM

## 2025-03-26 DIAGNOSIS — R46.89 COGNITIVE AND BEHAVIORAL CHANGES: ICD-10-CM

## 2025-03-26 DIAGNOSIS — F50.89 PSYCHOGENIC VOMITING WITH NAUSEA: ICD-10-CM

## 2025-03-26 DIAGNOSIS — C92.10 CML (CHRONIC MYELOID LEUKEMIA) (HCC): ICD-10-CM

## 2025-03-26 DIAGNOSIS — E78.2 MIXED HYPERLIPIDEMIA: ICD-10-CM

## 2025-03-26 DIAGNOSIS — R10.13 EPIGASTRIC PAIN: ICD-10-CM

## 2025-03-26 DIAGNOSIS — I10 PRIMARY HYPERTENSION: ICD-10-CM

## 2025-03-26 DIAGNOSIS — E55.9 VITAMIN D DEFICIENCY: ICD-10-CM

## 2025-03-26 DIAGNOSIS — R73.03 PREDIABETES: ICD-10-CM

## 2025-03-26 DIAGNOSIS — D56.1 BETA THALASSEMIA (HCC): Primary | ICD-10-CM

## 2025-03-26 DIAGNOSIS — N95.9 MENOPAUSAL PROBLEM: ICD-10-CM

## 2025-03-26 LAB
25(OH)D3 SERPL-MCNC: 39.4 NG/ML (ref 30–100)
ALBUMIN SERPL-MCNC: 4 G/DL (ref 3.2–4.6)
ALBUMIN/GLOB SERPL: 1.2 (ref 1–1.9)
ALP SERPL-CCNC: 78 U/L (ref 35–104)
ALT SERPL-CCNC: 17 U/L (ref 8–45)
ANION GAP SERPL CALC-SCNC: 10 MMOL/L (ref 7–16)
AST SERPL-CCNC: 30 U/L (ref 15–37)
BASOPHILS # BLD: 0.05 K/UL (ref 0–0.2)
BASOPHILS NFR BLD: 0.7 % (ref 0–2)
BILIRUB SERPL-MCNC: 0.4 MG/DL (ref 0–1.2)
BUN SERPL-MCNC: 9 MG/DL (ref 8–23)
CALCIUM SERPL-MCNC: 9.8 MG/DL (ref 8.8–10.2)
CHLORIDE SERPL-SCNC: 104 MMOL/L (ref 98–107)
CHOLEST SERPL-MCNC: 167 MG/DL (ref 0–200)
CO2 SERPL-SCNC: 27 MMOL/L (ref 20–29)
CREAT SERPL-MCNC: 0.79 MG/DL (ref 0.6–1.1)
DIFFERENTIAL METHOD BLD: ABNORMAL
EOSINOPHIL # BLD: 0.22 K/UL (ref 0–0.8)
EOSINOPHIL NFR BLD: 3 % (ref 0.5–7.8)
ERYTHROCYTE [DISTWIDTH] IN BLOOD BY AUTOMATED COUNT: 16 % (ref 11.9–14.6)
EST. AVERAGE GLUCOSE BLD GHB EST-MCNC: 134 MG/DL
GGT SERPL-CCNC: 24 U/L (ref 5–36)
GLOBULIN SER CALC-MCNC: 3.5 G/DL (ref 2.3–3.5)
GLUCOSE SERPL-MCNC: 110 MG/DL (ref 70–99)
HBA1C MFR BLD: 6.3 % (ref 0–5.6)
HCT VFR BLD AUTO: 32.3 % (ref 35.8–46.3)
HDLC SERPL-MCNC: 47 MG/DL (ref 40–60)
HDLC SERPL: 3.6 (ref 0–5)
HGB BLD-MCNC: 10 G/DL (ref 11.7–15.4)
IMM GRANULOCYTES # BLD AUTO: 0.02 K/UL (ref 0–0.5)
IMM GRANULOCYTES NFR BLD AUTO: 0.3 % (ref 0–5)
LDLC SERPL CALC-MCNC: 91 MG/DL (ref 0–100)
LIPASE SERPL-CCNC: 48 U/L (ref 13–60)
LYMPHOCYTES # BLD: 1.85 K/UL (ref 0.5–4.6)
LYMPHOCYTES NFR BLD: 25.2 % (ref 13–44)
MCH RBC QN AUTO: 22 PG (ref 26.1–32.9)
MCHC RBC AUTO-ENTMCNC: 31 G/DL (ref 31.4–35)
MCV RBC AUTO: 71 FL (ref 82–102)
MONOCYTES # BLD: 0.7 K/UL (ref 0.1–1.3)
MONOCYTES NFR BLD: 9.5 % (ref 4–12)
NEUTS SEG # BLD: 4.49 K/UL (ref 1.7–8.2)
NEUTS SEG NFR BLD: 61.3 % (ref 43–78)
NRBC # BLD: 0 K/UL (ref 0–0.2)
PLATELET # BLD AUTO: 312 K/UL (ref 150–450)
PMV BLD AUTO: 11.3 FL (ref 9.4–12.3)
POTASSIUM SERPL-SCNC: 4 MMOL/L (ref 3.5–5.1)
PROT SERPL-MCNC: 7.5 G/DL (ref 6.3–8.2)
RBC # BLD AUTO: 4.55 M/UL (ref 4.05–5.2)
SODIUM SERPL-SCNC: 141 MMOL/L (ref 136–145)
TRIGL SERPL-MCNC: 146 MG/DL (ref 0–150)
TSH W FREE THYROID IF ABNORMAL: 0.69 UIU/ML (ref 0.27–4.2)
VLDLC SERPL CALC-MCNC: 29 MG/DL (ref 6–23)
WBC # BLD AUTO: 7.3 K/UL (ref 4.3–11.1)

## 2025-03-26 PROCEDURE — 99214 OFFICE O/P EST MOD 30 MIN: CPT

## 2025-03-26 PROCEDURE — 3077F SYST BP >= 140 MM HG: CPT

## 2025-03-26 PROCEDURE — 3080F DIAST BP >= 90 MM HG: CPT

## 2025-03-26 RX ORDER — CYANOCOBALAMIN 1000 UG/ML
1000 INJECTION, SOLUTION INTRAMUSCULAR; SUBCUTANEOUS
Qty: 3 EACH | Refills: 1 | Status: SHIPPED | OUTPATIENT
Start: 2025-03-26

## 2025-03-26 SDOH — ECONOMIC STABILITY: FOOD INSECURITY: WITHIN THE PAST 12 MONTHS, THE FOOD YOU BOUGHT JUST DIDN'T LAST AND YOU DIDN'T HAVE MONEY TO GET MORE.: NEVER TRUE

## 2025-03-26 SDOH — ECONOMIC STABILITY: FOOD INSECURITY: WITHIN THE PAST 12 MONTHS, YOU WORRIED THAT YOUR FOOD WOULD RUN OUT BEFORE YOU GOT MONEY TO BUY MORE.: NEVER TRUE

## 2025-03-26 ASSESSMENT — ENCOUNTER SYMPTOMS
ABDOMINAL PAIN: 1
VOMITING: 1
NAUSEA: 1
BACK PAIN: 1
CHEST TIGHTNESS: 0
SHORTNESS OF BREATH: 0

## 2025-03-26 ASSESSMENT — VISUAL ACUITY: OU: 1

## 2025-03-26 NOTE — PROGRESS NOTES
Florence Correia (:  1960) is a 64 y.o. female,Established patient, here for evaluation of the following chief complaint(s):  New Patient    Patient Active Problem List   Diagnosis    Lichen sclerosus of female genitalia    DDD (degenerative disc disease), cervical    Chronic bilateral low back pain with left-sided sciatica    Thyroid disease    DDD (degenerative disc disease), lumbar    HNP (herniated nucleus pulposus), lumbar    Vitamin D deficiency    Lumbar radiculopathy, chronic    Neck pain    Beta thalassemia (HCC)    Hyperlipidemia    Other specified glaucoma    Lumbar stenosis with neurogenic claudication    Prediabetes    S/P total knee replacement using cement    BMI 34.0-34.9,adult    Arthritis    Anemia    Osteoarthritis of right knee    CML (chronic myeloid leukemia) (HCC)    Cervical stenosis of spine    Posterior vaginal wall prolapse    Incontinence of feces with fecal urgency    Weakness of pelvic floor    Uterovaginal prolapse, complete     3/26/25:  Back had been feeling better but she picked up her 1-year-old grandson and pulled her back around T4-T5. When she rolls up on her left side she starts cramping and gets nauseous- this sometimes leads to vomiting. Vomiting then relieves pain. Pain has improved with Toradol. XR of thoracic spine showed showed bone spurring and DDD in T-spine. She reports mental confusion including word-finding difficulty and forgetfulness. She had a brain CT that showed some mild ischemic changes. Her father had Lewy body dementia and she is scared she is heading in that direction. She tried to get through the MRI but got nauseas and had to end the exam. Her bp is elevated today. She states it is related to driving on [address] and that home BP is ok. She has not tolerated higher doses of BP medication in the past.        Review of Systems   Constitutional:  Negative for chills, fever and unexpected weight change.   HENT: Negative.     Respiratory:  Negative for

## 2025-03-26 NOTE — PATIENT INSTRUCTIONS
Your blood pressure is noted to be elevated today. This is evidence that your high blood pressure is uncontrolled. Having uncontrolled hypertension can shorten your life dramatically. You are at risk for multiple health problems if you continue to have uncontrolled hypertension, to include heart disease, heart failure, kidney disease, kidney failure, and stroke. I advise that you take your blood pressure medication as prescribed and monitor your blood pressure. If either number is equal to or higher than 140/90, please notify myself or the office to discuss how to better control your blood pressure.      Mammo is scheduled.

## 2025-03-27 ENCOUNTER — RESULTS FOLLOW-UP (OUTPATIENT)
Dept: INTERNAL MEDICINE CLINIC | Facility: CLINIC | Age: 65
End: 2025-03-27

## 2025-04-02 LAB
COLLECTION INFORMATION: NORMAL
DATE SENT TO REF LAB: NORMAL

## 2025-04-15 ENCOUNTER — PATIENT MESSAGE (OUTPATIENT)
Dept: INTERNAL MEDICINE CLINIC | Facility: CLINIC | Age: 65
End: 2025-04-15

## 2025-04-15 DIAGNOSIS — N95.9 MENOPAUSAL PROBLEM: ICD-10-CM

## 2025-04-15 DIAGNOSIS — M50.30 DDD (DEGENERATIVE DISC DISEASE), CERVICAL: Primary | ICD-10-CM

## 2025-04-15 DIAGNOSIS — Z87.81 HISTORY OF FRACTURE: ICD-10-CM

## 2025-04-23 ENCOUNTER — APPOINTMENT (OUTPATIENT)
Dept: MAMMOGRAPHY | Age: 65
End: 2025-04-23
Payer: MEDICARE

## 2025-04-23 ENCOUNTER — HOSPITAL ENCOUNTER (OUTPATIENT)
Dept: MAMMOGRAPHY | Age: 65
Discharge: HOME | End: 2025-04-26
Attending: INTERNAL MEDICINE
Payer: MEDICARE

## 2025-04-23 DIAGNOSIS — Z12.31 OTHER SCREENING MAMMOGRAM: ICD-10-CM

## 2025-04-23 PROCEDURE — 77063 BREAST TOMOSYNTHESIS BI: CPT

## 2025-04-30 ENCOUNTER — PATIENT MESSAGE (OUTPATIENT)
Dept: INTERNAL MEDICINE CLINIC | Facility: CLINIC | Age: 65
End: 2025-04-30

## 2025-04-30 DIAGNOSIS — N90.4 LICHEN SCLEROSUS OF FEMALE GENITALIA: Primary | ICD-10-CM

## 2025-04-30 RX ORDER — CLOBETASOL PROPIONATE 0.5 MG/G
CREAM TOPICAL 3 TIMES DAILY
Qty: 45 G | Refills: 5 | Status: SHIPPED | OUTPATIENT
Start: 2025-04-30

## 2025-04-30 NOTE — TELEPHONE ENCOUNTER
Patient is requesting refill for her clobetasol cream that was previously prescribed by you. Patient states she uses is BID daily for lichen sclerosis. Rx pended.

## 2025-06-12 ENCOUNTER — HOSPITAL ENCOUNTER (OUTPATIENT)
Dept: LAB | Age: 65
Discharge: HOME | End: 2025-06-12
Payer: MEDICARE

## 2025-06-12 DIAGNOSIS — C92.10 CML (CHRONIC MYELOID LEUKEMIA) (HCC): ICD-10-CM

## 2025-06-12 LAB
ALBUMIN SERPL-MCNC: 3.6 G/DL (ref 3.2–4.6)
ALBUMIN/GLOB SERPL: 1 (ref 1–1.9)
ALP SERPL-CCNC: 80 U/L (ref 35–104)
ALT SERPL-CCNC: 14 U/L (ref 8–45)
ANION GAP SERPL CALC-SCNC: 11 MMOL/L (ref 7–16)
AST SERPL-CCNC: 23 U/L (ref 15–37)
BASOPHILS # BLD: 0.07 K/UL (ref 0–0.2)
BASOPHILS NFR BLD: 0.7 % (ref 0–2)
BILIRUB SERPL-MCNC: 0.3 MG/DL (ref 0–1.2)
BUN SERPL-MCNC: 11 MG/DL (ref 8–23)
CALCIUM SERPL-MCNC: 9.3 MG/DL (ref 8.8–10.2)
CHLORIDE SERPL-SCNC: 105 MMOL/L (ref 98–107)
CO2 SERPL-SCNC: 25 MMOL/L (ref 20–29)
CREAT SERPL-MCNC: 1 MG/DL (ref 0.6–1.1)
DIFFERENTIAL METHOD BLD: ABNORMAL
EOSINOPHIL # BLD: 0.33 K/UL (ref 0–0.8)
EOSINOPHIL NFR BLD: 3.4 % (ref 0.5–7.8)
ERYTHROCYTE [DISTWIDTH] IN BLOOD BY AUTOMATED COUNT: 15.4 % (ref 11.9–14.6)
GLOBULIN SER CALC-MCNC: 3.5 G/DL (ref 2.3–3.5)
GLUCOSE SERPL-MCNC: 148 MG/DL (ref 70–99)
HCT VFR BLD AUTO: 31.6 % (ref 35.8–46.3)
HGB BLD-MCNC: 9.6 G/DL (ref 11.7–15.4)
IMM GRANULOCYTES # BLD AUTO: 0.03 K/UL (ref 0–0.5)
IMM GRANULOCYTES NFR BLD AUTO: 0.3 % (ref 0–5)
LYMPHOCYTES # BLD: 2.31 K/UL (ref 0.5–4.6)
LYMPHOCYTES NFR BLD: 23.9 % (ref 13–44)
MCH RBC QN AUTO: 22.3 PG (ref 26.1–32.9)
MCHC RBC AUTO-ENTMCNC: 30.4 G/DL (ref 31.4–35)
MCV RBC AUTO: 73.5 FL (ref 82–102)
MONOCYTES # BLD: 0.75 K/UL (ref 0.1–1.3)
MONOCYTES NFR BLD: 7.7 % (ref 4–12)
NEUTS SEG # BLD: 6.19 K/UL (ref 1.7–8.2)
NEUTS SEG NFR BLD: 64 % (ref 43–78)
NRBC # BLD: 0 K/UL (ref 0–0.2)
PLATELET # BLD AUTO: 303 K/UL (ref 150–450)
PMV BLD AUTO: 9.6 FL (ref 9.4–12.3)
POTASSIUM SERPL-SCNC: 4.1 MMOL/L (ref 3.5–5.1)
PROT SERPL-MCNC: 7.1 G/DL (ref 6.3–8.2)
RBC # BLD AUTO: 4.3 M/UL (ref 4.05–5.2)
SODIUM SERPL-SCNC: 141 MMOL/L (ref 136–145)
WBC # BLD AUTO: 9.7 K/UL (ref 4.3–11.1)

## 2025-06-12 PROCEDURE — 80053 COMPREHEN METABOLIC PANEL: CPT

## 2025-06-12 PROCEDURE — 36415 COLL VENOUS BLD VENIPUNCTURE: CPT

## 2025-06-12 PROCEDURE — 85025 COMPLETE CBC W/AUTO DIFF WBC: CPT

## 2025-06-22 ENCOUNTER — CLINICAL DOCUMENTATION (OUTPATIENT)
Dept: ONCOLOGY | Age: 65
End: 2025-06-22

## 2025-06-26 ENCOUNTER — PATIENT MESSAGE (OUTPATIENT)
Dept: INTERNAL MEDICINE CLINIC | Facility: CLINIC | Age: 65
End: 2025-06-26

## 2025-06-26 ENCOUNTER — OFFICE VISIT (OUTPATIENT)
Dept: INTERNAL MEDICINE CLINIC | Facility: CLINIC | Age: 65
End: 2025-06-26

## 2025-06-26 ENCOUNTER — OFFICE VISIT (OUTPATIENT)
Dept: ONCOLOGY | Age: 65
End: 2025-06-26
Payer: MEDICARE

## 2025-06-26 VITALS
HEIGHT: 65 IN | RESPIRATION RATE: 16 BRPM | DIASTOLIC BLOOD PRESSURE: 74 MMHG | SYSTOLIC BLOOD PRESSURE: 141 MMHG | BODY MASS INDEX: 31.47 KG/M2 | WEIGHT: 188.9 LBS | TEMPERATURE: 98.4 F | HEART RATE: 74 BPM | OXYGEN SATURATION: 97 %

## 2025-06-26 VITALS
OXYGEN SATURATION: 97 % | HEART RATE: 83 BPM | WEIGHT: 189 LBS | SYSTOLIC BLOOD PRESSURE: 136 MMHG | BODY MASS INDEX: 32.44 KG/M2 | DIASTOLIC BLOOD PRESSURE: 74 MMHG

## 2025-06-26 DIAGNOSIS — M70.61 TROCHANTERIC BURSITIS OF RIGHT HIP: Primary | ICD-10-CM

## 2025-06-26 DIAGNOSIS — C92.10 CML (CHRONIC MYELOID LEUKEMIA) (HCC): Primary | ICD-10-CM

## 2025-06-26 DIAGNOSIS — M48.02 CERVICAL STENOSIS OF SPINE: ICD-10-CM

## 2025-06-26 DIAGNOSIS — D56.1 BETA THALASSEMIA (HCC): ICD-10-CM

## 2025-06-26 PROCEDURE — 99214 OFFICE O/P EST MOD 30 MIN: CPT | Performed by: INTERNAL MEDICINE

## 2025-06-26 RX ORDER — IMATINIB MESYLATE 400 MG/1
400 TABLET, FILM COATED ORAL DAILY
Qty: 30 TABLET | Refills: 5 | Status: ACTIVE | OUTPATIENT
Start: 2025-06-26

## 2025-06-26 RX ORDER — LIDOCAINE HYDROCHLORIDE 10 MG/ML
3 INJECTION, SOLUTION INFILTRATION; PERINEURAL ONCE
Status: COMPLETED | OUTPATIENT
Start: 2025-06-26 | End: 2025-06-26

## 2025-06-26 RX ORDER — TRIAMCINOLONE ACETONIDE 40 MG/ML
40 INJECTION, SUSPENSION INTRA-ARTICULAR; INTRAMUSCULAR ONCE
Status: COMPLETED | OUTPATIENT
Start: 2025-06-26 | End: 2025-06-26

## 2025-06-26 RX ORDER — TRIAMCINOLONE ACETONIDE 40 MG/ML
40 INJECTION, SUSPENSION INTRA-ARTICULAR; INTRAMUSCULAR ONCE
Status: DISCONTINUED | OUTPATIENT
Start: 2025-06-26 | End: 2025-06-26

## 2025-06-26 RX ADMIN — TRIAMCINOLONE ACETONIDE 40 MG: 40 INJECTION, SUSPENSION INTRA-ARTICULAR; INTRAMUSCULAR at 14:15

## 2025-06-26 RX ADMIN — LIDOCAINE HYDROCHLORIDE 3 ML: 10 INJECTION, SOLUTION INFILTRATION; PERINEURAL at 14:07

## 2025-06-26 ASSESSMENT — PATIENT HEALTH QUESTIONNAIRE - PHQ9
1. LITTLE INTEREST OR PLEASURE IN DOING THINGS: NOT AT ALL
SUM OF ALL RESPONSES TO PHQ QUESTIONS 1-9: 0
2. FEELING DOWN, DEPRESSED OR HOPELESS: NOT AT ALL
SUM OF ALL RESPONSES TO PHQ QUESTIONS 1-9: 0

## 2025-06-26 ASSESSMENT — VISUAL ACUITY: OU: 1

## 2025-06-26 ASSESSMENT — ENCOUNTER SYMPTOMS
SHORTNESS OF BREATH: 0
CHEST TIGHTNESS: 0

## 2025-06-26 NOTE — PROGRESS NOTES
[facility]: Office Visit Established Patient    Reason for follow up:    Florence Correia  is seen in follow-up for chronic phase CML.       Overview: (copied from prior)  Ms. Correia was seen for the first time in February 2016.  She was referred for evaluation of an elevated white count.  Ms. Dick is a woman of  Generally good health.  She has a variety well compensated medical problems including beta+ thalassemia, degenerative joint disease, hypothyroidism, diverticulitis and chronic pain syndrome.  She has been seen by pain management and has undergone a variety of interventions for varied discomforts.  She stated that she had undergone a routine CBC some time in June 2015.  Her understanding is that this CBC was normal.  On February 2, 2016, she was evaluated for generalized achiness principally in her back, this prompted a CBC and sedimentation rate.  The CBC was notable for a white count of 22,200 with a hematocrit of 34.2 and an MCV of 68.  Her platelet count was 324,000.  The automated differential revealed 78% granulocytes 20% lymphocytes 2% monocytes.  Her sedimentation rate was 28.  Other than for her discomfort, the patient indicated that she felt well.  She denied any poppy swelling.The peripheral flow cytometry was normal as was the LIBBY-2 kinase assay. Her BCR-ABL analysis however was notable for mutated transcripts in 81% of circulating cells. She began treatment with imatinib in April 2016. Her first BCR-ABL quantitation following the initiation of imatinib showed a greater than 3 log reduction in her CML burden.  She shortly thereafter went into a complete molecular remission. She undergoes routine assessments of BCR-ABL. In February 2020, after a prolonged period of a complete molecular remission she was found to have BCR-ABL transcripts at  0.0469%. She had been taking imatinib 5-6 times per week due to diarrhea at that time. She was told to take the medication daily

## 2025-06-26 NOTE — PROGRESS NOTES
Oral Chemotherapy Adherence:     Current Regimen:  Drug Name: Gleevec  Dose: 400mg  Frequency: daily    Barriers to care identified including (financial, physical, psychosocial) : No    Missed doses reported: No    Patient verbalizes understanding of what to do in the event of a missed dose: Yes    Adverse reactions/toxicities reported:None, See Review of Systems

## 2025-06-26 NOTE — PATIENT INSTRUCTIONS
Patient Information from Today's Visit    The members of your Oncology Medical Home are listed below:    Physician Provider: Dr. Jason Cerna  Advanced Practice Clinician: Macey Keen  Registered Nurse: Tammy SMALL   Nurse Navigator: N/A  Medical Assistant: Froy MEEHAN  : Sophia SERRANO  Supportive Care Services: Shaunaloreta DENHAMIDA Hagan    Diagnosis (Information Sheet Provided on Day of Diagnosis): CML    Follow Up Instructions:   - Labs reviewed   - Continue Gleevec as prescribed     Follow up in 3 months with labs prior    Has Treatment Plan Been Finalized? Yes - see prior treatment plan documentation    Current Labs:   No visits with results within 3 Day(s) from this visit.   Latest known visit with results is:   Hospital Outpatient Visit on 06/12/2025   Component Date Value Ref Range Status    Sodium 06/12/2025 141  136 - 145 mmol/L Final    Potassium 06/12/2025 4.1  3.5 - 5.1 mmol/L Final    Chloride 06/12/2025 105  98 - 107 mmol/L Final    CO2 06/12/2025 25  20 - 29 mmol/L Final    Anion Gap 06/12/2025 11  7 - 16 mmol/L Final    Glucose 06/12/2025 148 (H)  70 - 99 mg/dL Final    Comment: <70 mg/dL Consistent with, but not fully diagnostic of hypoglycemia.  100 - 125 mg/dL Impaired fasting glucose/consistent with pre-diabetes mellitus.  > 126 mg/dl Fasting glucose consistent with overt diabetes mellitus      BUN 06/12/2025 11  8 - 23 MG/DL Final    Creatinine 06/12/2025 1.00  0.60 - 1.10 MG/DL Final    Est, Glom Filt Rate 06/12/2025 63  >60 ml/min/1.73m2 Final    Comment:    Pediatric calculator link: https://www.kidney.org/professionals/kdoqi/gfr_calculatorped     These results are not intended for use in patients <18 years of age.     eGFR results are calculated without a race factor using  the 2021 CKD-EPI equation. Careful clinical correlation is recommended, particularly when comparing to results calculated using previous equations.  The CKD-EPI equation is less accurate in patients with extremes of muscle

## 2025-06-26 NOTE — PROGRESS NOTES
Florence Correia (:  1960) is a 65 y.o. female,Established patient, here for evaluation of the following chief complaint(s):  Hypothyroidism (3 month follow up )    Patient Active Problem List   Diagnosis    Lichen sclerosus of female genitalia    DDD (degenerative disc disease), cervical    Chronic bilateral low back pain with left-sided sciatica    Thyroid disease    DDD (degenerative disc disease), lumbar    HNP (herniated nucleus pulposus), lumbar    Vitamin D deficiency    Lumbar radiculopathy, chronic    Neck pain    Beta thalassemia (HCC)    Hyperlipidemia    Other specified glaucoma    Lumbar stenosis with neurogenic claudication    Prediabetes    S/P total knee replacement using cement    BMI 34.0-34.9,adult    Arthritis    Anemia    Osteoarthritis of right knee    CML (chronic myeloid leukemia) (HCC)    Cervical stenosis of spine    Posterior vaginal wall prolapse    Incontinence of feces with fecal urgency    Weakness of pelvic floor    Uterovaginal prolapse, complete     25:  Saw chiropractor-Mk Cox and all previous concerns regarding brain fog and pain has vastly improved. She states she is feeling herself again and denies any current complaints aside from right hip pain. She has received steroid injections in the past that were helpful, and she would like to have CSI today while in the clinic.     Had colonoscopy Tuesday [facility]- high fiber diet  Beta Thalassemia- tolerating B12 injections well.      3/26/25:  Back had been feeling better but she picked up her 1-year-old grandson and pulled her back around T4-T5. When she rolls up on her left side she starts cramping and gets nauseous- this sometimes leads to vomiting. Vomiting then relieves pain. Pain has improved with Toradol. XR of thoracic spine showed showed bone spurring and DDD in T-spine. She reports mental confusion including word-finding difficulty and forgetfulness. She had a brain CT that showed some mild

## 2025-06-27 RX ORDER — LEVOTHYROXINE SODIUM 125 UG/1
125 TABLET ORAL DAILY
Qty: 90 TABLET | Refills: 1 | Status: SHIPPED | OUTPATIENT
Start: 2025-06-27

## 2025-06-29 ENCOUNTER — RESULTS FOLLOW-UP (OUTPATIENT)
Dept: INTERNAL MEDICINE CLINIC | Facility: CLINIC | Age: 65
End: 2025-06-29

## 2025-06-30 LAB
COLLECTION INFORMATION: NORMAL
DATE SENT TO REF LAB: NORMAL
Lab: NORMAL

## 2025-06-30 RX ORDER — LEVOTHYROXINE SODIUM 125 UG/1
125 TABLET ORAL DAILY
Qty: 90 TABLET | Refills: 1 | Status: SHIPPED | OUTPATIENT
Start: 2025-06-30

## (undated) DEVICE — REM POLYHESIVE ADULT PATIENT RETURN ELECTRODE: Brand: VALLEYLAB

## (undated) DEVICE — CANISTER, RIGID, 2000CC: Brand: MEDLINE INDUSTRIES, INC.

## (undated) DEVICE — Device

## (undated) DEVICE — DERMABOND SKIN ADH 0.7ML -- DERMABOND ADVANCED 12/BX

## (undated) DEVICE — SUTURE CAPIO SZ 0 L48IN NONABSOR TAPR CUT NDL MONOFILAMENT

## (undated) DEVICE — CATHETER IV NDL L1.25IN 16GA GRY POLYUR WNG HUB DISP FOR

## (undated) DEVICE — WAX SURG 2.5GM HEMSTAT BNE BEESWAX PARAFFIN ISO PALMITATE

## (undated) DEVICE — INSULATED BLADE ELECTRODE: Brand: EDGE

## (undated) DEVICE — HOOK RETRCT L5MM E SHRP SELF RET SYS LONE STAR

## (undated) DEVICE — SYR 10ML LUER LOK 1/5ML GRAD --

## (undated) DEVICE — INTENT TO BE USED WITH SUTURE MATERIAL FOR TISSUE CLOSURE: Brand: RICHARD-ALLAN® NEEDLE 3/8 CIRCLE TROCAR

## (undated) DEVICE — SUTURE PDS II SZ 2-0 L27IN ABSRB VLT L36MM CT-1 1/2 CIR Z339H

## (undated) DEVICE — INTENDED FOR TISSUE SEPARATION, AND OTHER PROCEDURES THAT REQUIRE A SHARP SURGICAL BLADE TO PUNCTURE OR CUT.: Brand: BARD-PARKER ® STAINLESS STEEL BLADES

## (undated) DEVICE — CYSTO/BLADDER IRRIGATION SET, REGULATING CLAMP

## (undated) DEVICE — AGENT HEMSTAT W3XL4IN OXIDIZED REGENERATED CELOS ABSRB FOR

## (undated) DEVICE — Z CONVERTED USE 2421973 CONTAINER SPEC 60/30ML 10% FRMLN POLYPR PREFIL

## (undated) DEVICE — SOLUTION IRRIG 1000ML 0.9% SOD CHL USP POUR PLAS BTL

## (undated) DEVICE — NDL SPNE QNCKE 18GX3.5IN LF --

## (undated) DEVICE — STANDARD HYPODERMIC NEEDLE,POLYPROPYLENE HUB: Brand: MONOJECT

## (undated) DEVICE — PACK PROCEDURE SURG POST LAMINECTOMY CDS

## (undated) DEVICE — 1010 S-DRAPE TOWEL DRAPE 10/BX: Brand: STERI-DRAPE™

## (undated) DEVICE — GOWN,PREVENTION PLUS,2XL,ST,22/CS: Brand: MEDLINE

## (undated) DEVICE — ADHESIVE SKIN CLOSURE 4X22 CM PREMIERPRO EXOFINFUSION DISP

## (undated) DEVICE — TUBING, SUCTION, 1/4" X 10', STRAIGHT: Brand: MEDLINE

## (undated) DEVICE — DRSG AQUACEL SURG 3.5X6IN -- CONVERT TO ITEM 369227

## (undated) DEVICE — SOLUTION IV 1000ML 0.9% SOD CHL

## (undated) DEVICE — 2000CC GUARDIAN II: Brand: GUARDIAN

## (undated) DEVICE — KENDALL SCD EXPRESS SLEEVES, KNEE LENGTH, MEDIUM: Brand: KENDALL SCD

## (undated) DEVICE — CONTROL SYRINGE LUER-LOCK TIP: Brand: MONOJECT

## (undated) DEVICE — INTENDED FOR TISSUE SEPARATION, AND OTHER PROCEDURES THAT REQUIRE A SHARP SURGICAL BLADE TO PUNCTURE OR CUT.: Brand: BARD-PARKER SAFETY BLADES SIZE 15, STERILE

## (undated) DEVICE — RETRACTOR SURG W18.3XL32.5CM PLAS SELF RET W/ 2 CATH CLP

## (undated) DEVICE — GARMENT,MEDLINE,DVT,INT,CALF,MED, GEN2: Brand: MEDLINE

## (undated) DEVICE — (D)PREP SKN CHLRAPRP APPL 26ML -- CONVERT TO ITEM 371833

## (undated) DEVICE — (D)SYR 10ML 1/5ML GRAD NSAF -- PKGING CHANGE USE ITEM 338027

## (undated) DEVICE — PAD,NON-ADHERENT,2X3,STERILE,LF,1/PK: Brand: MEDLINE

## (undated) DEVICE — LITHOTOMY: Brand: MEDLINE INDUSTRIES, INC.

## (undated) DEVICE — FLOSEAL MATRIX IS INDICATED IN SURGICAL PROCEDURES (OTHER THAN IN OPHTHALMIC) AS AN ADJUNCT TO HEMOSTASIS WHEN CONTROL OF BLEEDING BY LIGATURE OR CONVENTIONAL PROCEDURES IS INEFFECTIVE OR IMPRACTICAL.: Brand: FLOSEAL HEMOSTATIC MATRIX

## (undated) DEVICE — KARLIN BLADE, ULTRA KNIFE, SATIN, STAINLESS STEEL, 1.5 MM TIP, 4 IN, SINGLE USE, (10/PK): Brand: SYMMETRY SURGICAL

## (undated) DEVICE — SUTURE VCRL + SZ 3-0 L18IN ABSRB UD SH 1/2 CIR TAPERCUT NDL VCP864D

## (undated) DEVICE — SUTURE VCRL VIO BR 0 18IN C/R M04 J701D

## (undated) DEVICE — DRAPE XR C ARM 41X74IN LF --

## (undated) DEVICE — COVER,MAYO STAND,STERILE: Brand: MEDLINE

## (undated) DEVICE — SYR 3ML LL TIP 1/10ML GRAD --

## (undated) DEVICE — TOTAL TRAY, DB, 100% SILI FOLEY, 16FR 10: Brand: MEDLINE

## (undated) DEVICE — GLOVE SURG SZ 6 L12IN FNGR THK79MIL GRN LTX FREE

## (undated) DEVICE — SUTURE VCRL SZ 2-0 L36IN ABSRB UD L36MM CT-1 1/2 CIR J945H

## (undated) DEVICE — PAD PT POS 36 IN SURGYPAD DISP

## (undated) DEVICE — 3M™ TEGADERM™ TRANSPARENT FILM DRESSING FRAME STYLE, 1628, 6 IN X 8 IN (15 CM X 20 CM), 10/CT 8CT/CASE: Brand: 3M™ TEGADERM™

## (undated) DEVICE — GLOVE SURG SZ 6 THK91MIL LTX FREE SYN POLYISOPRENE ANTI

## (undated) DEVICE — TOOL 14MH30 LEGEND 14CM 3MM: Brand: MIDAS REX ™

## (undated) DEVICE — SUTURE CAPTURING DEVICE: Brand: CAPIO SLIM

## (undated) DEVICE — MEDI-VAC NON-CONDUCTIVE SUCTION TUBING: Brand: CARDINAL HEALTH

## (undated) DEVICE — DRAPE TWL SURG 16X26IN BLU ORB04] ALLCARE INC]

## (undated) DEVICE — CASPAR DISTR PIN12MMSTER: Brand: AESCULAP